# Patient Record
Sex: MALE | Race: WHITE | NOT HISPANIC OR LATINO | Employment: OTHER | URBAN - METROPOLITAN AREA
[De-identification: names, ages, dates, MRNs, and addresses within clinical notes are randomized per-mention and may not be internally consistent; named-entity substitution may affect disease eponyms.]

---

## 2022-04-29 ENCOUNTER — HISTORICAL (OUTPATIENT)
Dept: ADMINISTRATIVE | Facility: HOSPITAL | Age: 87
End: 2022-04-29
Payer: MEDICARE

## 2022-04-29 LAB
ABS NEUT (OLG): 3.56 (ref 2.1–9.2)
ALBUMIN SERPL-MCNC: 3.9 G/DL (ref 3.4–4.8)
ALBUMIN/GLOB SERPL: 1.6 {RATIO} (ref 1.1–2)
ALP SERPL-CCNC: 35 U/L (ref 40–150)
ALT SERPL-CCNC: 20 U/L (ref 0–55)
AST SERPL-CCNC: 26 U/L (ref 5–34)
BASOPHILS # BLD AUTO: 0 10*3/UL (ref 0–0.2)
BASOPHILS NFR BLD AUTO: 0 %
BILIRUB SERPL-MCNC: 0.8 MG/DL
BILIRUBIN DIRECT+TOT PNL SERPL-MCNC: 0.4 (ref 0–0.5)
BILIRUBIN DIRECT+TOT PNL SERPL-MCNC: 0.4 (ref 0–0.8)
BUN SERPL-MCNC: 23.5 MG/DL (ref 8.4–25.7)
CALCIUM SERPL-MCNC: 9.6 MG/DL (ref 8.7–10.5)
CHLORIDE SERPL-SCNC: 107 MMOL/L (ref 98–107)
CO2 SERPL-SCNC: 25 MMOL/L (ref 23–31)
CREAT SERPL-MCNC: 1.41 MG/DL (ref 0.73–1.18)
DEPRECATED CALCIDIOL+CALCIFEROL SERPL-MC: 22.1 NG/ML (ref 30–80)
EOSINOPHIL # BLD AUTO: 0.3 10*3/UL (ref 0–0.9)
EOSINOPHIL NFR BLD AUTO: 4 %
ERYTHROCYTE [DISTWIDTH] IN BLOOD BY AUTOMATED COUNT: 13.2 % (ref 11.5–17)
ERYTHROCYTE [SEDIMENTATION RATE] IN BLOOD: 7 MM/H (ref 0–15)
EST. AVERAGE GLUCOSE BLD GHB EST-MCNC: 102.5 MG/DL
GLOBULIN SER-MCNC: 2.5 G/DL (ref 2.4–3.5)
GLUCOSE SERPL-MCNC: 106 MG/DL (ref 82–115)
HBA1C MFR BLD: 5.2 %
HCT VFR BLD AUTO: 44.9 % (ref 42–52)
HEMOLYSIS INTERF INDEX SERPL-ACNC: 3
HGB BLD-MCNC: 15.4 G/DL (ref 14–18)
ICTERIC INTERF INDEX SERPL-ACNC: 1
LIPEMIC INTERF INDEX SERPL-ACNC: 0
LYMPHOCYTES # BLD AUTO: 2.4 10*3/UL (ref 0.6–4.6)
LYMPHOCYTES NFR BLD AUTO: 34 %
MANUAL DIFF? (OHS): NO
MCH RBC QN AUTO: 32.4 PG (ref 27–31)
MCHC RBC AUTO-ENTMCNC: 34.3 G/DL (ref 33–36)
MCV RBC AUTO: 94.3 FL (ref 80–94)
MONOCYTES # BLD AUTO: 0.8 10*3/UL (ref 0.1–1.3)
MONOCYTES NFR BLD AUTO: 11 %
NEUTROPHILS # BLD AUTO: 3.56 10*3/UL (ref 2.1–9.2)
NEUTROPHILS NFR BLD AUTO: 50 %
PLATELET # BLD AUTO: 158 10*3/UL (ref 130–400)
PMV BLD AUTO: 11.5 FL (ref 9.4–12.4)
POTASSIUM SERPL-SCNC: 3.7 MMOL/L (ref 3.5–5.1)
PROT SERPL-MCNC: 6.4 G/DL (ref 5.8–7.6)
PSA SERPL-MCNC: 7.26 NG/ML
RBC # BLD AUTO: 4.76 10*6/UL (ref 4.7–6.1)
RHEUMATOID FACT SERPL-ACNC: <13 [IU]/ML
SODIUM SERPL-SCNC: 141 MMOL/L (ref 136–145)
T4 FREE SERPL-MCNC: 0.92 NG/DL (ref 0.7–1.48)
TSH SERPL-ACNC: 2.31 M[IU]/L (ref 0.35–4.94)
URATE SERPL-MCNC: 8.1 MG/DL (ref 3.5–7.2)
WBC # SPEC AUTO: 7.1 10*3/UL (ref 4.5–11.5)

## 2022-05-05 ENCOUNTER — LAB VISIT (OUTPATIENT)
Dept: LAB | Facility: HOSPITAL | Age: 87
End: 2022-05-05
Attending: INTERNAL MEDICINE
Payer: MEDICARE

## 2022-05-05 DIAGNOSIS — M81.0 OSTEOPOROSIS, SENILE: ICD-10-CM

## 2022-05-05 DIAGNOSIS — N40.0 BENIGN PROSTATIC HYPERPLASIA, UNSPECIFIED WHETHER LOWER URINARY TRACT SYMPTOMS PRESENT: ICD-10-CM

## 2022-05-05 DIAGNOSIS — E11.9 DIABETES: ICD-10-CM

## 2022-05-05 DIAGNOSIS — Z00.00 WELL ADULT EXAM: ICD-10-CM

## 2022-05-05 DIAGNOSIS — M81.0 OSTEOPOROSIS, SENILE: Primary | ICD-10-CM

## 2022-05-05 DIAGNOSIS — R94.8: ICD-10-CM

## 2022-05-05 DIAGNOSIS — K21.9 GASTROESOPHAGEAL REFLUX DISEASE, UNSPECIFIED WHETHER ESOPHAGITIS PRESENT: ICD-10-CM

## 2022-05-05 DIAGNOSIS — E78.5 HYPERLIPIDEMIA, UNSPECIFIED HYPERLIPIDEMIA TYPE: ICD-10-CM

## 2022-05-05 LAB
CHOLEST SERPL-MCNC: 156 MG/DL
CHOLEST/HDLC SERPL: 3 {RATIO} (ref 0–5)
HDLC SERPL-MCNC: 53 MG/DL (ref 35–60)
LDLC SERPL CALC-MCNC: 85 MG/DL (ref 50–140)
TRIGL SERPL-MCNC: 92 MG/DL (ref 34–140)
VLDLC SERPL CALC-MCNC: 18 MG/DL

## 2022-05-05 PROCEDURE — 36415 COLL VENOUS BLD VENIPUNCTURE: CPT

## 2022-05-05 PROCEDURE — 80061 LIPID PANEL: CPT

## 2024-04-22 ENCOUNTER — HOSPITAL ENCOUNTER (EMERGENCY)
Facility: HOSPITAL | Age: 89
Discharge: HOME | End: 2024-04-22
Attending: EMERGENCY MEDICINE
Payer: MEDICARE

## 2024-04-22 VITALS
RESPIRATION RATE: 20 BRPM | TEMPERATURE: 98 F | HEART RATE: 79 BPM | OXYGEN SATURATION: 97 % | DIASTOLIC BLOOD PRESSURE: 95 MMHG | HEIGHT: 70 IN | WEIGHT: 170 LBS | SYSTOLIC BLOOD PRESSURE: 134 MMHG | BODY MASS INDEX: 24.34 KG/M2

## 2024-04-22 DIAGNOSIS — S00.03XA CONTUSION OF SCALP, INITIAL ENCOUNTER: ICD-10-CM

## 2024-04-22 DIAGNOSIS — S51.811A SKIN TEAR OF RIGHT FOREARM WITHOUT COMPLICATION, INITIAL ENCOUNTER: ICD-10-CM
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
## 2024-04-22 PROCEDURE — 99284 EMERGENCY DEPT VISIT MOD MDM: CPT | Mod: 25

## 2024-04-22 RX ORDER — CLOPIDOGREL BISULFATE 75 MG/1
75 TABLET ORAL EVERY MORNING
COMMUNITY

## 2024-04-22 NOTE — ED PROVIDER NOTES
Encounter Date: 4/22/2024       History     Chief Complaint   Patient presents with    Fall     Pt c/o pain to right hand and head s/p trip and fall. Pt denies loc. + blood thinners.     The history is provided by the patient.   Fall  The accident occurred just prior to arrival. The fall occurred while walking. He fell from a height of 3 to 5 ft. He landed on A hard floor. The point of impact was the head and right wrist. The pain is present in the head, right hand and right wrist. He was Ambulatory at the scene. There was No entrapment after the fall. There was No drug use involved in the accident. There was No alcohol use involved in the accident. Pertinent negatives include no back pain, no fever, no nausea, no vomiting and no loss of consciousness.   States he has chronic gait instability, but he was walking without his rolling walker.  He lost his balance and fell, sustaining abrasions to right wrist/hand area when his hand fell between stair rail spindles.  Struck left forehead on the ground.  Takes blood thinners, he thinks for his heart.    Review of patient's allergies indicates:  No Known Allergies  No past medical history on file. Valvular heart disease  No past surgical history on file. Valve replacement surgery  No family history on file.     Review of Systems   Constitutional:  Negative for fever.   HENT:  Negative for sore throat.    Respiratory:  Negative for shortness of breath.    Cardiovascular:  Negative for chest pain.   Gastrointestinal:  Negative for nausea and vomiting.   Genitourinary:  Negative for dysuria.   Musculoskeletal:  Negative for back pain.   Skin:  Negative for rash.   Neurological:  Negative for loss of consciousness and weakness.   Hematological:  Does not bruise/bleed easily.       Physical Exam     Initial Vitals [04/22/24 1418]   BP Pulse Resp Temp SpO2   (!) 134/95 79 20 98.3 °F (36.8 °C) 97 %      MAP       --         Physical Exam    Nursing note and vitals  reviewed.  Constitutional: He appears well-developed and well-nourished.   HENT:   Head: Normocephalic and atraumatic.       Right Ear: External ear normal.   Left Ear: External ear normal.   Nose: Nose normal.   Eyes: Conjunctivae and EOM are normal. Pupils are equal, round, and reactive to light.   Neck: Neck supple.   Normal range of motion.  Cardiovascular:  Normal rate, regular rhythm and intact distal pulses.           Murmur heard.  Systolic murmur is present with a grade of 2/6.  Winston valve   Pulmonary/Chest: Breath sounds normal.   Abdominal: Abdomen is soft. Bowel sounds are normal.   Musculoskeletal:         General: Normal range of motion.        Hands:       Cervical back: Normal range of motion and neck supple.     Neurological: He is alert and oriented to person, place, and time. He has normal strength. GCS score is 15. GCS eye subscore is 4. GCS verbal subscore is 5. GCS motor subscore is 6.   Skin: Skin is warm and dry. Capillary refill takes less than 2 seconds.   Psychiatric: He has a normal mood and affect. His behavior is normal. Judgment and thought content normal.         ED Course   Procedures  Labs Reviewed - No data to display       Imaging Results              CT Head Without Contrast (Final result)  Result time 04/22/24 15:31:45      Final result by Pari Coronado MD (04/22/24 15:31:45)                   Impression:      No acute intracranial abnormality.    Chronic microvascular ischemic changes.      Electronically signed by: Pari Coronado  Date:    04/22/2024  Time:    15:31               Narrative:    EXAMINATION:  CT HEAD WITHOUT CONTRAST    CLINICAL HISTORY:  Head trauma, minor (Age >= 65y);long term anticoagulant use;    TECHNIQUE:  Axial scans were obtained from skull base to the vertex.    Coronal and sagittal reconstructions obtained from the axial data.    Automatic exposure control was utilized to limit radiation dose.    Contrast: None    Radiation Dose:    Total  DLP: 976 mGy*cm    COMPARISON:  None    FINDINGS:  There is no acute intracranial hemorrhage or edema. The gray-white matter differentiation is preserved.  Scattered hypodensities in the subcortical and periventricular white matter likely represent chronic microvascular ischemic changes.    There is no mass effect or midline shift.  There is diffuse parenchymal volume loss.  The basal cisterns are patent. There is no abnormal extra-axial fluid collection.  Carotid and vertebral artery calcifications are noted.    The calvarium and skull base are intact.  There is mild scattered paranasal sinus mucosal thickening.                                       Medications - No data to display  Medical Decision Making  Amount and/or Complexity of Data Reviewed  Radiology: ordered. Decision-making details documented in ED Course.       Differential includes:  abrasions, contusions, skin tears, ICH.  Given anticoagulant/antiplatelet use and advanced age, will obtain head CT.  No hand or wrist fracture, clinically.  Will dress wounds.                               Clinical Impression:  Final diagnoses:  [W19.XXXA] Fall, initial encounter (Primary)  [S00.03XA] Contusion of scalp, initial encounter  [S51.811A] Skin tear of right forearm without complication, initial encounter          ED Disposition Condition    Discharge Stable          ED Prescriptions    None       Follow-up Information       Follow up With Specialties Details Why Contact Info    Follow up with your primary MD in 3-5 days if not improved.  Return to ED for worsening symptoms.                 Frank Mcqueen MD  04/22/24 [id number]

## 2024-05-20 ENCOUNTER — HOSPITAL ENCOUNTER (INPATIENT)
Facility: HOSPITAL | Age: 89
LOS: 2 days | Discharge: NURSING, REHAB OR LONG-TERM CARE | DRG: 090 | End: 2024-05-23
Attending: EMERGENCY MEDICINE | Admitting: INTERNAL MEDICINE
Payer: MEDICARE

## 2024-05-20 DIAGNOSIS — S01.01XA LACERATION OF SCALP, INITIAL ENCOUNTER: ICD-10-CM

## 2024-05-20 DIAGNOSIS — R55 SYNCOPE: ICD-10-CM

## 2024-05-20 DIAGNOSIS — R55 SYNCOPE, UNSPECIFIED SYNCOPE TYPE: Primary | ICD-10-CM

## 2024-05-20 DIAGNOSIS — R07.9 CHEST PAIN: ICD-10-CM

## 2024-05-20 DIAGNOSIS — Z79.02 ANTIPLATELET OR ANTITHROMBOTIC LONG-TERM USE: ICD-10-CM
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
## 2024-05-20 LAB
ALBUMIN SERPL-MCNC: 4.1 G/DL (ref 3.4–4.8)
ALBUMIN/GLOB SERPL: 1.3 RATIO (ref 1.1–2)
ALP SERPL-CCNC: 63 UNIT/L (ref 40–150)
ALT SERPL-CCNC: 18 UNIT/L (ref 0–55)
ANION GAP SERPL CALC-SCNC: 12 MEQ/L
APTT PPP: 23.7 SECONDS (ref 23.2–33.7)
AST SERPL-CCNC: 34 UNIT/L (ref 5–34)
BASOPHILS # BLD AUTO: 0.07 X10(3)/MCL
BASOPHILS NFR BLD AUTO: 0.6 %
BILIRUB SERPL-MCNC: 0.8 MG/DL
BUN SERPL-MCNC: 11 MG/DL (ref 8.4–25.7)
CALCIUM SERPL-MCNC: 10.1 MG/DL (ref 8.8–10)
CHLORIDE SERPL-SCNC: 107 MMOL/L (ref 98–107)
CO2 SERPL-SCNC: 21 MMOL/L (ref 23–31)
CREAT SERPL-MCNC: 1.25 MG/DL (ref 0.73–1.18)
CREAT/UREA NIT SERPL: 9
EOSINOPHIL # BLD AUTO: 0.3 X10(3)/MCL (ref 0–0.9)
EOSINOPHIL NFR BLD AUTO: 2.8 %
ERYTHROCYTE [DISTWIDTH] IN BLOOD BY AUTOMATED COUNT: 12.8 % (ref 11.5–17)
GFR SERPLBLD CREATININE-BSD FMLA CKD-EPI: 55 ML/MIN/1.73/M2
GLOBULIN SER-MCNC: 3.2 GM/DL (ref 2.4–3.5)
GLUCOSE SERPL-MCNC: 114 MG/DL (ref 82–115)
HCT VFR BLD AUTO: 46 % (ref 42–52)
HGB BLD-MCNC: 16 G/DL (ref 14–18)
IMM GRANULOCYTES # BLD AUTO: 0.08 X10(3)/MCL (ref 0–0.04)
IMM GRANULOCYTES NFR BLD AUTO: 0.7 %
INR PPP: 0.9
LYMPHOCYTES # BLD AUTO: 2.74 X10(3)/MCL (ref 0.6–4.6)
LYMPHOCYTES NFR BLD AUTO: 25.4 %
MCH RBC QN AUTO: 33.8 PG (ref 27–31)
MCHC RBC AUTO-ENTMCNC: 34.8 G/DL (ref 33–36)
MCV RBC AUTO: 97 FL (ref 80–94)
MONOCYTES # BLD AUTO: 0.87 X10(3)/MCL (ref 0.1–1.3)
MONOCYTES NFR BLD AUTO: 8.1 %
NEUTROPHILS # BLD AUTO: 6.72 X10(3)/MCL (ref 2.1–9.2)
NEUTROPHILS NFR BLD AUTO: 62.4 %
NRBC BLD AUTO-RTO: 0 %
PLATELET # BLD AUTO: 136 X10(3)/MCL (ref 130–400)
PMV BLD AUTO: 10.2 FL (ref 7.4–10.4)
POTASSIUM SERPL-SCNC: 3.9 MMOL/L (ref 3.5–5.1)
PROT SERPL-MCNC: 7.3 GM/DL (ref 5.8–7.6)
PROTHROMBIN TIME: 12 SECONDS (ref 12.5–14.5)
RBC # BLD AUTO: 4.74 X10(6)/MCL (ref 4.7–6.1)
SODIUM SERPL-SCNC: 140 MMOL/L (ref 136–145)
TROPONIN I SERPL-MCNC: 0.02 NG/ML (ref 0–0.04)
WBC # SPEC AUTO: 10.78 X10(3)/MCL (ref 4.5–11.5)

## 2024-05-20 PROCEDURE — 96372 THER/PROPH/DIAG INJ SC/IM: CPT | Performed by: NURSE PRACTITIONER

## 2024-05-20 PROCEDURE — 85025 COMPLETE CBC W/AUTO DIFF WBC: CPT | Performed by: EMERGENCY MEDICINE

## 2024-05-20 PROCEDURE — 90471 IMMUNIZATION ADMIN: CPT | Performed by: EMERGENCY MEDICINE

## 2024-05-20 PROCEDURE — 85730 THROMBOPLASTIN TIME PARTIAL: CPT | Performed by: EMERGENCY MEDICINE

## 2024-05-20 PROCEDURE — 25000003 PHARM REV CODE 250: Performed by: INTERNAL MEDICINE

## 2024-05-20 PROCEDURE — 93010 ELECTROCARDIOGRAM REPORT: CPT | Mod: ,,, | Performed by: INTERNAL MEDICINE

## 2024-05-20 PROCEDURE — 63600175 PHARM REV CODE 636 W HCPCS: Performed by: NURSE PRACTITIONER

## 2024-05-20 PROCEDURE — 96375 TX/PRO/DX INJ NEW DRUG ADDON: CPT

## 2024-05-20 PROCEDURE — 85610 PROTHROMBIN TIME: CPT | Performed by: EMERGENCY MEDICINE

## 2024-05-20 PROCEDURE — 90715 TDAP VACCINE 7 YRS/> IM: CPT | Performed by: EMERGENCY MEDICINE

## 2024-05-20 PROCEDURE — 99291 CRITICAL CARE FIRST HOUR: CPT

## 2024-05-20 PROCEDURE — 12002 RPR S/N/AX/GEN/TRNK2.6-7.5CM: CPT

## 2024-05-20 PROCEDURE — 63600175 PHARM REV CODE 636 W HCPCS: Performed by: EMERGENCY MEDICINE

## 2024-05-20 PROCEDURE — 93005 ELECTROCARDIOGRAM TRACING: CPT

## 2024-05-20 PROCEDURE — 80053 COMPREHEN METABOLIC PANEL: CPT | Performed by: EMERGENCY MEDICINE

## 2024-05-20 PROCEDURE — 84484 ASSAY OF TROPONIN QUANT: CPT | Performed by: EMERGENCY MEDICINE

## 2024-05-20 PROCEDURE — 3E0234Z INTRODUCTION OF SERUM, TOXOID AND VACCINE INTO MUSCLE, PERCUTANEOUS APPROACH: ICD-10-PCS | Performed by: EMERGENCY MEDICINE

## 2024-05-20 PROCEDURE — 96365 THER/PROPH/DIAG IV INF INIT: CPT

## 2024-05-20 PROCEDURE — G0378 HOSPITAL OBSERVATION PER HR: HCPCS

## 2024-05-20 PROCEDURE — 25500020 PHARM REV CODE 255: Performed by: NURSE PRACTITIONER

## 2024-05-20 RX ORDER — FINASTERIDE 5 MG/1
5 TABLET, FILM COATED ORAL DAILY
Status: DISCONTINUED | OUTPATIENT
Start: 2024-05-21 | End: 2024-05-23 | Stop reason: HOSPADM

## 2024-05-20 RX ORDER — IBUPROFEN 200 MG
16 TABLET ORAL
Status: DISCONTINUED | OUTPATIENT
Start: 2024-05-20 | End: 2024-05-23 | Stop reason: HOSPADM

## 2024-05-20 RX ORDER — FAMOTIDINE 40 MG/1
40 TABLET, FILM COATED ORAL DAILY
COMMUNITY

## 2024-05-20 RX ORDER — RANOLAZINE 500 MG/1
1000 TABLET, EXTENDED RELEASE ORAL 2 TIMES DAILY
Status: DISCONTINUED | OUTPATIENT
Start: 2024-05-20 | End: 2024-05-23 | Stop reason: HOSPADM

## 2024-05-20 RX ORDER — ASPIRIN 81 MG/1
81 TABLET ORAL DAILY
Status: DISCONTINUED | OUTPATIENT
Start: 2024-05-21 | End: 2024-05-23 | Stop reason: HOSPADM

## 2024-05-20 RX ORDER — FUROSEMIDE 20 MG/1
20 TABLET ORAL DAILY
COMMUNITY

## 2024-05-20 RX ORDER — ALUMINUM HYDROXIDE, MAGNESIUM HYDROXIDE, AND SIMETHICONE 1200; 120; 1200 MG/30ML; MG/30ML; MG/30ML
30 SUSPENSION ORAL 4 TIMES DAILY PRN
Status: DISCONTINUED | OUTPATIENT
Start: 2024-05-20 | End: 2024-05-23 | Stop reason: HOSPADM

## 2024-05-20 RX ORDER — ONDANSETRON HYDROCHLORIDE 2 MG/ML
4 INJECTION, SOLUTION INTRAVENOUS EVERY 4 HOURS PRN
Status: DISCONTINUED | OUTPATIENT
Start: 2024-05-20 | End: 2024-05-23 | Stop reason: HOSPADM

## 2024-05-20 RX ORDER — FUROSEMIDE 20 MG/1
20 TABLET ORAL DAILY
Status: DISCONTINUED | OUTPATIENT
Start: 2024-05-21 | End: 2024-05-23 | Stop reason: HOSPADM

## 2024-05-20 RX ORDER — METOPROLOL TARTRATE 25 MG/1
25 TABLET, FILM COATED ORAL 2 TIMES DAILY
Status: DISCONTINUED | OUTPATIENT
Start: 2024-05-20 | End: 2024-05-23 | Stop reason: HOSPADM

## 2024-05-20 RX ORDER — NALOXONE HCL 0.4 MG/ML
0.02 VIAL (ML) INJECTION
Status: DISCONTINUED | OUTPATIENT
Start: 2024-05-20 | End: 2024-05-23 | Stop reason: HOSPADM

## 2024-05-20 RX ORDER — PROCHLORPERAZINE EDISYLATE 5 MG/ML
5 INJECTION INTRAMUSCULAR; INTRAVENOUS EVERY 6 HOURS PRN
Status: DISCONTINUED | OUTPATIENT
Start: 2024-05-20 | End: 2024-05-23 | Stop reason: HOSPADM

## 2024-05-20 RX ORDER — TALC
6 POWDER (GRAM) TOPICAL NIGHTLY PRN
Status: DISCONTINUED | OUTPATIENT
Start: 2024-05-20 | End: 2024-05-23 | Stop reason: HOSPADM

## 2024-05-20 RX ORDER — ASPIRIN 81 MG/1
81 TABLET ORAL DAILY
COMMUNITY

## 2024-05-20 RX ORDER — ISOSORBIDE MONONITRATE 30 MG/1
30 TABLET, EXTENDED RELEASE ORAL DAILY
COMMUNITY

## 2024-05-20 RX ORDER — METOPROLOL TARTRATE 25 MG/1
25 TABLET, FILM COATED ORAL 2 TIMES DAILY
COMMUNITY

## 2024-05-20 RX ORDER — TAMSULOSIN HYDROCHLORIDE 0.4 MG/1
0.4 CAPSULE ORAL DAILY
Status: DISCONTINUED | OUTPATIENT
Start: 2024-05-21 | End: 2024-05-23 | Stop reason: HOSPADM

## 2024-05-20 RX ORDER — ACETAMINOPHEN 500 MG
1000 TABLET ORAL EVERY 6 HOURS PRN
Status: DISCONTINUED | OUTPATIENT
Start: 2024-05-20 | End: 2024-05-20

## 2024-05-20 RX ORDER — LORAZEPAM 1 MG/1
1 TABLET ORAL EVERY 4 HOURS PRN
Status: DISCONTINUED | OUTPATIENT
Start: 2024-05-20 | End: 2024-05-23 | Stop reason: HOSPADM

## 2024-05-20 RX ORDER — IBUPROFEN 200 MG
24 TABLET ORAL
Status: DISCONTINUED | OUTPATIENT
Start: 2024-05-20 | End: 2024-05-23 | Stop reason: HOSPADM

## 2024-05-20 RX ORDER — GLUCAGON 1 MG
1 KIT INJECTION
Status: DISCONTINUED | OUTPATIENT
Start: 2024-05-20 | End: 2024-05-23 | Stop reason: HOSPADM

## 2024-05-20 RX ORDER — ATORVASTATIN CALCIUM 40 MG/1
40 TABLET, FILM COATED ORAL DAILY
Status: DISCONTINUED | OUTPATIENT
Start: 2024-05-21 | End: 2024-05-23 | Stop reason: HOSPADM

## 2024-05-20 RX ORDER — ATORVASTATIN CALCIUM 40 MG/1
40 TABLET, FILM COATED ORAL DAILY
COMMUNITY

## 2024-05-20 RX ORDER — ACETAMINOPHEN 325 MG/1
650 TABLET ORAL EVERY 4 HOURS PRN
Status: DISCONTINUED | OUTPATIENT
Start: 2024-05-20 | End: 2024-05-20

## 2024-05-20 RX ORDER — AMOXICILLIN 250 MG
1 CAPSULE ORAL 2 TIMES DAILY PRN
Status: DISCONTINUED | OUTPATIENT
Start: 2024-05-20 | End: 2024-05-23 | Stop reason: HOSPADM

## 2024-05-20 RX ORDER — SODIUM CHLORIDE 0.9 % (FLUSH) 0.9 %
10 SYRINGE (ML) INJECTION
Status: DISCONTINUED | OUTPATIENT
Start: 2024-05-20 | End: 2024-05-23 | Stop reason: HOSPADM

## 2024-05-20 RX ORDER — BISACODYL 10 MG/1
10 SUPPOSITORY RECTAL DAILY PRN
Status: DISCONTINUED | OUTPATIENT
Start: 2024-05-20 | End: 2024-05-23 | Stop reason: HOSPADM

## 2024-05-20 RX ORDER — ACETAMINOPHEN 500 MG
500 TABLET ORAL
Status: COMPLETED | OUTPATIENT
Start: 2024-05-20 | End: 2024-05-20

## 2024-05-20 RX ORDER — FINASTERIDE 5 MG/1
5 TABLET, FILM COATED ORAL DAILY
COMMUNITY

## 2024-05-20 RX ORDER — CEFAZOLIN SODIUM 2 G/50ML
2 SOLUTION INTRAVENOUS
Status: COMPLETED | OUTPATIENT
Start: 2024-05-20 | End: 2024-05-20

## 2024-05-20 RX ORDER — ISOSORBIDE MONONITRATE 30 MG/1
30 TABLET, EXTENDED RELEASE ORAL DAILY
Status: DISCONTINUED | OUTPATIENT
Start: 2024-05-21 | End: 2024-05-23 | Stop reason: HOSPADM

## 2024-05-20 RX ORDER — ENOXAPARIN SODIUM 100 MG/ML
40 INJECTION SUBCUTANEOUS EVERY 24 HOURS
Status: DISCONTINUED | OUTPATIENT
Start: 2024-05-20 | End: 2024-05-23 | Stop reason: HOSPADM

## 2024-05-20 RX ORDER — FOLIC ACID 1 MG/1
1 TABLET ORAL DAILY
Status: DISCONTINUED | OUTPATIENT
Start: 2024-05-21 | End: 2024-05-23 | Stop reason: HOSPADM

## 2024-05-20 RX ORDER — TAMSULOSIN HYDROCHLORIDE 0.4 MG/1
0.4 CAPSULE ORAL DAILY
COMMUNITY

## 2024-05-20 RX ORDER — CLOPIDOGREL BISULFATE 75 MG/1
75 TABLET ORAL EVERY MORNING
Status: DISCONTINUED | OUTPATIENT
Start: 2024-05-21 | End: 2024-05-23

## 2024-05-20 RX ORDER — SODIUM CHLORIDE 9 MG/ML
INJECTION, SOLUTION INTRAVENOUS CONTINUOUS
Status: DISCONTINUED | OUTPATIENT
Start: 2024-05-20 | End: 2024-05-22

## 2024-05-20 RX ORDER — THIAMINE HCL 100 MG
100 TABLET ORAL DAILY
Status: DISCONTINUED | OUTPATIENT
Start: 2024-05-21 | End: 2024-05-23 | Stop reason: HOSPADM

## 2024-05-20 RX ORDER — ACETAMINOPHEN 500 MG
1000 TABLET ORAL EVERY 6 HOURS PRN
Status: DISCONTINUED | OUTPATIENT
Start: 2024-05-20 | End: 2024-05-23 | Stop reason: HOSPADM

## 2024-05-20 RX ADMIN — SODIUM CHLORIDE: 9 INJECTION, SOLUTION INTRAVENOUS at 05:05

## 2024-05-20 RX ADMIN — PERFLUTREN 1.3 ML: 6.52 INJECTION, SUSPENSION INTRAVENOUS at 05:05

## 2024-05-20 RX ADMIN — ENOXAPARIN SODIUM 40 MG: 40 INJECTION SUBCUTANEOUS at 05:05

## 2024-05-20 RX ADMIN — CEFAZOLIN SODIUM 2 G: 2 SOLUTION INTRAVENOUS at 04:05

## 2024-05-20 RX ADMIN — TETANUS TOXOID, REDUCED DIPHTHERIA TOXOID AND ACELLULAR PERTUSSIS VACCINE, ADSORBED 0.5 ML: 5; 2.5; 8; 8; 2.5 SUSPENSION INTRAMUSCULAR at 04:05

## 2024-05-20 RX ADMIN — ACETAMINOPHEN 500 MG: 500 TABLET ORAL at 12:05

## 2024-05-20 NOTE — Clinical Note
Diagnosis: Laceration of scalp, initial encounter [217064]   Future Attending Provider: PELON OTOOLE [[id number]]   Admit to which facility:: [facility] [[id number]]

## 2024-05-20 NOTE — H&P
[facility] Hospital Medicine - H&P Note    Patient Name: Wes Stratton  : 1934  MRN: [id number]  PCP: Donta Easley MD  Admitting Physician:  WOJCIECH Ho MD  Admission Class: OP- Observation   Code status: DNR    Allergies   Patient has no known allergies.    Chief Complaint   Possible syncope    History of Present Illness   89 yr old male whose history includes CAD, VHD, HTN and BPH. At the time of my exam patient is AAO x 3. He is hard of hearing but provides a good history. Reports he's been in his usual state of health with no recent fever, vomiting, diarrhea, chest pain or SOB. However, when he woke this morning he had a laceration on his scalp and he was covered in blood. He has no recollection of any falls or injuries occurred during the night. Currently his speech is clear and he has no focal deficits.     VS on arrival: T 98, P 74, R 17, B/P 153/85, Sats 100% on room air. Initial labs unremarkable. CT head negative for acute intracranial findings. CT C-spine negative for acute fractures or dislocations. MRI brain shows moderate chronic microvascular ischemic changes but nothing acute. EKG SR with no concerning ST-T changes. Scalp laceration was sutured in ED.     ROS   Except as documented, all other systems reviewed and negative     Past Medical History   Coronary artery disease  Valvular heart disease - severe AS  Dyslipidemia  BPH  Carotid stenosis - 50-69% right ICA - CTA neck 23  Hard of hearing    Past Surgical History   CABG -   TAVR - 23  [facility] - showed 3 out of 4 occluded bypass grafts - not a candidate for redo CABG    Social History   Denies tobacco use. Drinks two shots of whisky daily and has so for many years. Lives alone and independent of ADLs. Still drives.    Screening for Social Drivers for health:  Patient screened for food insecurity, housing instability, transportation needs, utility difficulties, and interpersonal safety (select all  that apply as identified as concern)  []Housing or Food  []Transportation Needs  []Utility Difficulties  []Interpersonal safety  [x]None      Family History   Reviewed and negative    Home Medications     No current facility-administered medications on file prior to encounter.     Current Outpatient Medications on File Prior to Encounter   Medication Sig Dispense Refill    atorvastatin (LIPITOR) 40 MG tablet Take 40 mg by mouth once daily.      clopidogreL (PLAVIX) 75 mg tablet Take 75 mg by mouth every morning.      famotidine (PEPCID) 40 MG tablet Take 40 mg by mouth once daily.      finasteride (PROSCAR) 5 mg tablet Take 5 mg by mouth once daily.      furosemide (LASIX) 20 MG tablet Take 20 mg by mouth once daily.      metoprolol tartrate (LOPRESSOR) 25 MG tablet Take 25 mg by mouth 2 (two) times daily.      ranolazine 1,000 mg PSRG Take 1,000 mg by mouth 2 (two) times a day.      tamsulosin (FLOMAX) 0.4 mg Cap Take 0.4 mg by mouth once daily.      aspirin (ECOTRIN) 81 MG EC tablet Take 81 mg by mouth once daily.      isosorbide mononitrate (IMDUR) 30 MG 24 hr tablet Take 30 mg by mouth once daily.          Physical Exam   Vital Signs  Temp:  [97.9 °F (36.6 °C)-98.6 °F (37 °C)]   Pulse:  []   Resp:  [16-20]   BP: (138-156)/(85-95)   SpO2:  [95 %-100 %]    General: Appears comfortable  HEENT: NC/AT  Neck:  No JVD  Chest: CTABL  CVS: Regular rhythm. Normal S1/S2. + murmur  Abdomen: nondistended, normoactive BS, soft and non-tender.  MSK: No obvious deformity or joint swelling  Skin: Warm and dry, laceration to occipital skull with sutures intact and no active bleeding  Neuro: AAOx3, no focal neurological deficit  Psych: Cooperative    Labs     Recent Labs     05/20/24  0434   WBC 10.78   RBC 4.74   HGB 16.0   HCT 46.0   MCV 97.0*   MCH 33.8*   MCHC 34.8   RDW 12.8        Recent Labs     05/20/24  0434   PROTIME 12.0*   INR 0.9   PTT 23.7      Recent Labs     05/20/24 0434      K 3.9   CHLORIDE  "107   CO2 21*   BUN 11.0   CREATININE 1.25*   EGFRNORACEVR 55   GLUCOSE 114   CALCIUM 10.1*   ALBUMIN 4.1   GLOBULIN 3.2   ALKPHOS 63   ALT 18   AST 34   BILITOT 0.8     No results for input(s): "LACTIC" in the last 72 hours.  Recent Labs     05/20/24  0434   TROPONINI 0.018        Microbiology Results (last 7 days)       ** No results found for the last 168 hours. **           Imaging   MRI Brain Without Contrast  Narrative: EXAMINATION:  MRI BRAIN WITHOUT CONTRAST    CLINICAL HISTORY:  Stroke, follow up;Transient ischemic attack (TIA);    TECHNIQUE:  Multiplanar, multisequence MR images of the brain were obtained without the administration of intravenous contrast.    COMPARISON:  CT head dated 05/20/2024    FINDINGS:  There is no restricted diffusion, hemorrhage or edema.  Moderate scattered T2/FLAIR hyperintensities in the subcortical and periventricular white matter likely represent chronic microvascular ischemic changes.    There is no mass effect or midline shift.  The basal cisterns are patent.  There is moderate diffuse parenchymal volume loss.  There is no hydrocephalus or abnormal extra-axial fluid collection.  The major intracranial flow voids are patent.  There is mild sphenoid sinus mucosal thickening.  Impression: 1. No acute intracranial abnormality.  2. Moderate chronic microvascular ischemic changes.    Electronically signed by: Pari Coronado  Date:    05/20/2024  Time:    12:28  CT Head Without Contrast  Narrative: Technique: CT of the head was performed without intravenous contrast with axial as well as coronal and sagittal images.    Comparison: April 22, 2024    Dosage Information: Automated exposure control was utilized.  DLP 1240    Clinical history: Fall (Arrives [facility] tripped fell hit head on corner or endtable by bed - positive loc, denies thinners, head lac bleeding controlled w/ kerlex dressing at this time, gcs 15).    Findings:    Hemorrhage: No acute intracranial hemorrhage is " seen.    CSF spaces: The ventricles, sulci and basal cisterns all appear significantly prominent suggesting an element of global cerebral atrophy.    Brain parenchyma: No acute large vessel territory infarct is identified. Moderate scattered microvascular change is seen in portions of the periventricular and deep white matter tracts.    Cerebellum: Unremarkable.    Vascular: Moderate atheromatous calcification of the intracranial arteries is seen.    Sella and skull base: The sella appears to be within normal limits for age.    Cerebellopontine angles: Within normal limits.    Herniation: None.    Calvarium: No acute linear or depressed skull fracture is seen.    Scalp: Minimal scalp soft tissue swelling is seen along the midline high parietal region. This consistent with a scalp contusion. No underlying bone injury is identified.    Maxillofacial Structures:    Paranasal sinuses: There is mucoperiosteal thickening of the sphenoid sinus.  Otherwise, the visualized paranasal sinuses appear clear with no significant mucoperiosteal thickening or air fluid levels identified.    Orbits: The bilateral native orbital lenses are absent. The orbits are otherwise unremarkable.  Impression: Impression:    1. Minimal scalp soft tissue swelling is seen along the midline high parietal region. This consistent with a scalp contusion. No underlying bone injury is identified.    2. No acute intracranial traumatic injury identified. Details and other findings as noted above.    No significant discrepancy with overnight report.    Electronically signed by: Rajat Reyes  Date:    05/20/2024  Time:    07:52  CT Cervical Spine Without Contrast  Narrative: Technique: CT of the cervical spine was performed without intravenous contrast with axial as well as sagittal and coronal images.    Comparison: None.    Dosage Information: Automated exposure control was utilized.  DLP 1240    Clinical history: Fall (Arrives [facility] unit [facility] tripped fell hit  head on corner or endtable by bed - positive loc, denies thinners, head lac bleeding controlled w/ kerlex dressing at this time, gcs 15).    Findings:    Lung apices: The visualized lung apices appear unremarkable.    Spine:    Mineralization: Within normal limits.    Rotation: No significant rotation is seen.    Scoliosis: No significant scoliosis is seen.    Vertebral Fusion: No vertebral fusion is identified.    Listhesis: No significant listhesis is identified.    Lordosis: The cervical lordosis is maintained.    Intervertebral disc spaces: Severely decreased disc height is seen at C4-C5.    Endplate Sclerosis: Endplate sclerosis is seen off the opposing endplates at C4-C5.    Facet degenerative changes: Mild multilevel facet degenerative changes are seen.    Fractures: No acute cervical spine fracture dislocation or subluxation is seen.    This exam does not exclude the possibility of intrathecal soft tissue, ligamentous or vascular injury.  Impression: Impression:    1. No acute cervical spine fracture dislocation or subluxation is seen.    2. Degenerative changes and other details as above.    No significant discrepancy with overnight report    Electronically signed by: Rajat Reyes  Date:    05/20/2024  Time:    07:49    Assessment & Plan     Possible syncope vs fall with concussion   - CT head and MRI brain both negative for acute findings  - ECHO and carotid US pending  - Fall precautions    Scalp laceration   - sutures can be removed by PCP in 7-10 days    Hx CAD - S/P CABG 1985  - resume, ASA, plavix and statin    Hx severe AS - S/P TAVR June 2024    Hypertension - stab;e  - home meds resumed    VTE Prophylaxis: Lovenox  CODE STATUS: Detailed discussion with patient and he is clear that in the event of cardiac or respiratory arrest he does not want CPR or intubation.      IMarilyn, CARL-BC have discussed this patients case with Dr. Ho who agrees with the diagnosis and treatment plan.    I  Anjelica rosales MD agree with above  89-year-old male with past medical history of coronary artery disease, valvular heart disease, hypertension, BPH woke up this morning and found himself covered in the blood he has no re-collection of any falls or injuries during the night patient denies any history of seizures.  CT of the head was negative CT of the C-spine was negative for any acute fractures.  MRI of the brain was done and that showed chronic microvascular ischemic changes but nothing acute.  Patient had scalp laceration that was sutured patient has been admitted to hospitalist service for further management and care   General alert awake oriented but very hard of hearing  Chest clear to auscultate   Abdomen soft nontender    Will get syncope workup that includes echo, carotid ultrasound get orthostatics put the patient on telemetry monitoring   Will consult PT and OT  Ativan 1 p.o. q.6 p.r.n. for any kind of withdrawal symptoms [facility] protocol  Will also start on thiamine and folate    Prophylaxis: Lovenox

## 2024-05-20 NOTE — ED PROVIDER NOTES
Encounter Date: 5/20/2024       History     Chief Complaint   Patient presents with    Fall     Arrives [facility] tripped fell hit head on corner or endtable by bed - positive loc, denies thinners, head lac bleeding controlled w/ kerlex dressing at this time, gcs 15     Patient is an 89-year-old male who was transferred to the ER by ambulance secondary to apparent fall at home.  Patient states he does not remember the fall.  Patient states he woke up in his bed with blood on the sheets and all over him.  Patient states he thinks he may have hit the headboard when he was getting back in bed from using the bathroom but he does not know for sure.  Patient is on Plavix.  It was reported prior to arrival that patient was not on blood thinners.  Patient denies headache or neck pain.  He denies any chest pain or shortness of breath.  Patient lives alone.  Patient has a history of hypertension, aortic stenosis, coronary artery disease and remote CABG and is on Plavix      Review of patient's allergies indicates:  No Known Allergies  No past medical history on file.  No past surgical history on file.  No family history on file.     Review of Systems   HENT: Negative.     Eyes:  Negative for pain.   Respiratory: Negative.     Cardiovascular: Negative.    Gastrointestinal: Negative.    Musculoskeletal: Negative.    Skin:  Positive for wound.   Neurological: Negative.    Psychiatric/Behavioral: Negative.         Physical Exam     Initial Vitals [05/20/24 0326]   BP Pulse Resp Temp SpO2   (!) 153/85 74 17 98 °F (36.7 °C) 100 %      MAP       --         Physical Exam    Nursing note and vitals reviewed.  Constitutional: He appears well-developed and well-nourished.   Patient is well-appearing, GCS is 15.  Patient is very hard of hearing.   HENT:   Patient has a 4 cm laceration to the scalp with moderate bleeding   Eyes: Pupils are equal, round, and reactive to light.   Neck: Neck supple.   There was no C-spine tenderness to  palpation   Normal range of motion.  Cardiovascular:  Normal rate, regular rhythm and normal heart sounds.           Pulmonary/Chest: Breath sounds normal. No respiratory distress. He has no wheezes. He has no rhonchi.   Abdominal: Abdomen is soft. There is no abdominal tenderness.   Musculoskeletal:         General: Normal range of motion.      Cervical back: Normal range of motion and neck supple.     Neurological: He is alert and oriented to person, place, and time. He has normal strength.   Skin:   Laceration to the scalp as above, patient also has a minor skin tear to the left forearm.   Psychiatric: He has a normal mood and affect. Thought content normal.         ED Course   Lac Repair    Date/Time: 5/20/2024 4:14 AM    Performed by: Samy Caldera MD  Authorized by: Samy Caldera MD    Consent:     Consent obtained:  Verbal and emergent situation  Universal protocol:     Patient identity confirmed:  Verbally with patient  Anesthesia:     Anesthesia method:  Local infiltration    Local anesthetic:  Lidocaine 1% w/o epi  Laceration details:     Location:  Scalp    Length (cm):  4  Pre-procedure details:     Preparation:  Patient was prepped and draped in usual sterile fashion  Treatment:     Area cleansed with:  Povidone-iodine    Amount of cleaning:  Standard    Irrigation solution:  Sterile saline    Irrigation volume:  250    Irrigation method:  Pressure wash    Visualized foreign bodies/material removed: no      Debridement:  Minimal  Skin repair:     Repair method:  Sutures    Suture size:  3-0    Suture material:  Chromic gut    Suture technique:  Simple interrupted    Number of sutures:  14  Approximation:     Approximation:  Close  Repair type:     Repair type:  Intermediate  Post-procedure details:     Dressing:  Bulky dressing    Procedure completion:  Tolerated  Critical Care    Date/Time: 5/20/2024 4:45 AM    Performed by: Samy Caldera MD  Authorized by: Samy Caldera MD   Direct patient critical care time: 20 minutes  Additional history critical care time: 5 minutes  Ordering / reviewing critical care time: 5 minutes  Documentation critical care time: 10 minutes  Consulting other physicians critical care time: 5 minutes  Total critical care time (exclusive of procedural time) : 45 minutes  Critical care time was exclusive of separately billable procedures and treating other patients and teaching time.  Critical care was necessary to treat or prevent imminent or life-threatening deterioration of the following conditions: trauma and CNS failure or compromise.  Critical care was time spent personally by me on the following activities: development of treatment plan with patient or surrogate, discussions with consultants, interpretation of cardiac output measurements, evaluation of patient's response to treatment, examination of patient, obtaining history from patient or surrogate, ordering and performing treatments and interventions, ordering and review of laboratory studies, ordering and review of radiographic studies, pulse oximetry, re-evaluation of patient's condition and review of old charts.        Labs Reviewed   COMPREHENSIVE METABOLIC PANEL - Abnormal; Notable for the following components:       Result Value    CO2 21 (*)     Creatinine 1.25 (*)     Calcium 10.1 (*)     All other components within normal limits   PROTIME-INR - Abnormal; Notable for the following components:    PT 12.0 (*)     All other components within normal limits   CBC WITH DIFFERENTIAL - Abnormal; Notable for the following components:    MCV 97.0 (*)     MCH 33.8 (*)     IG# 0.08 (*)     All other components within normal limits   APTT - Normal   TROPONIN I - Normal   CBC W/ AUTO DIFFERENTIAL    Narrative:     The following orders were created for panel order CBC auto differential.  Procedure                               Abnormality         Status                     ---------                                -----------         ------                     CBC with Differential[[id number]]       Abnormal            Final result                 Please view results for these tests on the individual orders.     EKG Readings: (Independently Interpreted)   Initial Reading: No STEMI. Rhythm: Normal Sinus Rhythm. Heart Rate: 77. Ectopy: No Ectopy. Conduction: RBBB. ST Segments: Normal ST Segments. T Waves: Normal. Axis: Normal.     ECG Results              EKG 12-lead (Final result)        Collection Time Result Time QRS Duration OHS QTC Calculation    05/20/24 04:19:17 05/23/24 13:24:58 136 463                     Final result by Interface, Lab [facility] (05/23/24 13:25:04)                   Narrative:    Test Reason : W19.XXXA,    Vent. Rate : 077 BPM     Atrial Rate : 077 BPM     P-R Int : 174 ms          QRS Dur : 136 ms      QT Int : 410 ms       P-R-T Axes : 068 113 062 degrees     QTc Int : 463 ms    Normal sinus rhythm  Right bundle branch block  Abnormal ECG  No previous ECGs available  Confirmed by Hunter Tillman MD ([id number]) on 5/23/2024 1:24:53 PM    Referred By: [email or web address]   SELF           Confirmed By:Hunter Tillman MD                                  Imaging Results              MRI Brain Without Contrast (Final result)  Result time 05/20/24 12:28:58      Final result by Pari Coronado MD (05/20/24 12:28:58)                   Impression:      1. No acute intracranial abnormality.  2. Moderate chronic microvascular ischemic changes.      Electronically signed by: Pari Coronado  Date:    05/20/2024  Time:    12:28               Narrative:    EXAMINATION:  MRI BRAIN WITHOUT CONTRAST    CLINICAL HISTORY:  Stroke, follow up;Transient ischemic attack (TIA);    TECHNIQUE:  Multiplanar, multisequence MR images of the brain were obtained without the administration of intravenous contrast.    COMPARISON:  CT head dated 05/20/2024    FINDINGS:  There is no restricted diffusion, hemorrhage or edema.  Moderate scattered  T2/FLAIR hyperintensities in the subcortical and periventricular white matter likely represent chronic microvascular ischemic changes.    There is no mass effect or midline shift.  The basal cisterns are patent.  There is moderate diffuse parenchymal volume loss.  There is no hydrocephalus or abnormal extra-axial fluid collection.  The major intracranial flow voids are patent.  There is mild sphenoid sinus mucosal thickening.                                       X-Ray Chest 1 View (Final result)  Result time 05/20/24 13:56:29      Final result by Pari Coronado MD (05/20/24 13:56:29)                   Impression:      No acute abnormality of the chest.      Electronically signed by: Pari Coronado  Date:    05/20/2024  Time:    13:56               Narrative:    EXAMINATION:  XR CHEST 1 VIEW    CLINICAL HISTORY:  MRI request for any implant verification. p;    TECHNIQUE:  AP chest    COMPARISON:  None.    FINDINGS:  The heart is normal in size.  There are changes of prior median sternotomy with prosthetic heart valve in place.  There is bibasilar subsegmental atelectasis.  There is no pleural effusion or visible pneumothorax.                                       CT Cervical Spine Without Contrast (Final result)  Result time 05/20/24 07:49:33      Final result by Rajat Reyes MD (05/20/24 07:49:33)                   Impression:    Impression:    1. No acute cervical spine fracture dislocation or subluxation is seen.    2. Degenerative changes and other details as above.    No significant discrepancy with overnight report      Electronically signed by: Rajat Reyes  Date:    05/20/2024  Time:    07:49               Narrative:      Technique: CT of the cervical spine was performed without intravenous contrast with axial as well as sagittal and coronal images.    Comparison: None.    Dosage Information: Automated exposure control was utilized.  DLP 1240    Clinical history: Fall (Arrives [facility] tripped  fell hit head on corner or endtable by bed - positive loc, denies thinners, head lac bleeding controlled w/ kerlex dressing at this time, gcs 15).    Findings:    Lung apices: The visualized lung apices appear unremarkable.    Spine:    Mineralization: Within normal limits.    Rotation: No significant rotation is seen.    Scoliosis: No significant scoliosis is seen.    Vertebral Fusion: No vertebral fusion is identified.    Listhesis: No significant listhesis is identified.    Lordosis: The cervical lordosis is maintained.    Intervertebral disc spaces: Severely decreased disc height is seen at C4-C5.    Endplate Sclerosis: Endplate sclerosis is seen off the opposing endplates at C4-C5.    Facet degenerative changes: Mild multilevel facet degenerative changes are seen.    Fractures: No acute cervical spine fracture dislocation or subluxation is seen.    This exam does not exclude the possibility of intrathecal soft tissue, ligamentous or vascular injury.                        Preliminary result by Homar Fontana MD (05/20/24 04:35:18)                   Impression:    1. No acute cervical spine fracture dislocation or subluxation is seen.  2. Degenerative changes and other details as above.               Narrative:    START OF REPORT:  Technique: CT of the cervical spine was performed without intravenous contrast with axial as well as sagittal and coronal images.    Comparison: None.    Dosage Information: Automated exposure control was utilized.    Clinical history: Fall (Arrives [facility] tripped fell hit head on corner or endtable by bed - positive loc, denies thinners, head lac bleeding controlled w/ kerlex dressing at this time, gcs 15).    Findings:  Lung apices: The visualized lung apices appear unremarkable.  Spine:  Spinal canal: The spinal canal appears unremarkable.  Spinal cord: The spinal cord appears unremarkable.  Mineralization: Within normal limits.  Rotation: No significant rotation is  seen.  Scoliosis: No significant scoliosis is seen.  Vertebral Fusion: No vertebral fusion is identified.  Listhesis: No significant listhesis is identified.  Lordosis: The cervical lordosis is maintained.  Intervertebral disc spaces: Severely decreased disc height is seen at C4-C5.  Endplate Sclerosis: Endplate sclerosis is seen off the opposing endplates at C4-C5.  Facet degenerative changes: Mild multilevel facet degenerative changes are seen.  Fractures: No acute cervical spine fracture dislocation or subluxation is seen.    Miscellaneous:  Mastoid air cells: The visualized mastoid air cells appear clear.  Soft Tissues: Unremarkable.                                         CT Head Without Contrast (Final result)  Result time 05/20/24 07:52:30      Final result by Rajat Reyes MD (05/20/24 07:52:30)                   Impression:    Impression:    1. Minimal scalp soft tissue swelling is seen along the midline high parietal region. This consistent with a scalp contusion. No underlying bone injury is identified.    2. No acute intracranial traumatic injury identified. Details and other findings as noted above.    No significant discrepancy with overnight report.      Electronically signed by: Rajat Reyes  Date:    05/20/2024  Time:    07:52               Narrative:      Technique: CT of the head was performed without intravenous contrast with axial as well as coronal and sagittal images.    Comparison: April 22, 2024    Dosage Information: Automated exposure control was utilized.  DLP 1240    Clinical history: Fall (Arrives [facility] tripped fell hit head on corner or endtable by bed - positive loc, denies thinners, head lac bleeding controlled w/ kerlex dressing at this time, gcs 15).    Findings:    Hemorrhage: No acute intracranial hemorrhage is seen.    CSF spaces: The ventricles, sulci and basal cisterns all appear significantly prominent suggesting an element of global cerebral atrophy.    Brain  parenchyma: No acute large vessel territory infarct is identified. Moderate scattered microvascular change is seen in portions of the periventricular and deep white matter tracts.    Cerebellum: Unremarkable.    Vascular: Moderate atheromatous calcification of the intracranial arteries is seen.    Sella and skull base: The sella appears to be within normal limits for age.    Cerebellopontine angles: Within normal limits.    Herniation: None.    Calvarium: No acute linear or depressed skull fracture is seen.    Scalp: Minimal scalp soft tissue swelling is seen along the midline high parietal region. This consistent with a scalp contusion. No underlying bone injury is identified.    Maxillofacial Structures:    Paranasal sinuses: There is mucoperiosteal thickening of the sphenoid sinus.  Otherwise, the visualized paranasal sinuses appear clear with no significant mucoperiosteal thickening or air fluid levels identified.    Orbits: The bilateral native orbital lenses are absent. The orbits are otherwise unremarkable.                        Preliminary result by Homar Fontana MD (05/20/24 04:34:26)                   Impression:    1. Minimal scalp soft tissue swelling is seen along the midline high parietal region. This consistent with a scalp contusion. No underlying bone injury is identified.  2. No acute intracranial traumatic injury identified. Details and other findings as noted above.               Narrative:    START OF REPORT:  Technique: CT of the head was performed without intravenous contrast with axial as well as coronal and sagittal images.    Comparison: None.    Dosage Information: Automated exposure control was utilized.    Clinical history: Fall (Arrives [facility] tripped fell hit head on corner or endtable by bed - positive loc, denies thinners, head lac bleeding controlled w/ kerlex dressing at this time, gcs 15).    Findings:  Hemorrhage: No acute intracranial hemorrhage is seen.  CSF spaces: The  ventricles, sulci and basal cisterns all appear significantly prominent suggesting an element of global cerebral atrophy.  Brain parenchyma: No acute infarct is identified. Moderate scattered microvascular change is seen in portions of the periventricular and deep white matter tracts.  Cerebellum: Unremarkable.  Vascular: Moderate atheromatous calcification of the intracranial arteries is seen.  Sella and skull base: The sella appears to be within normal limits for age.  Cerebellopontine angles: Within normal limits.  Herniation: None.  Intracranial calcifications: Incidental note is made of bilateral choroid plexus calcification. Incidental note is made of some pineal region calcification. Incidental note is made of some calcification of the falx.  Calvarium: No acute linear or depressed skull fracture is seen.  Scalp: Minimal scalp soft tissue swelling is seen along the midline high parietal region. This consistent with a scalp contusion. No underlying bone injury is identified.    Maxillofacial Structures:  Paranasal sinuses: The visualized paranasal sinuses appear clear with no significant mucoperiosteal thickening or air fluid levels identified.  Orbits: The bilateral native orbital lenses are absent. The orbits are otherwise unremarkable.  Zygomatic arches: The zygomatic arches are intact and unremarkable.  Temporal bones and mastoids: The temporal bones and mastoids appear unremarkable.  TMJ: The mandibular condyles appear normally placed with respect to the mandibular fossa.                                         Medications   cefazolin (ANCEF) 2 gram in dextrose 5% 50 mL IVPB (premix) (0 g Intravenous Stopped 5/20/24 8013)   Tdap (BOOSTRIX) vaccine injection 0.5 mL (0.5 mLs Intramuscular Given 5/20/24 6634)   acetaminophen tablet 500 mg (500 mg Oral Given 5/20/24 1245)   perflutren lipid microspheres injection 1.3 mL (1.3 mLs Intravenous Given 5/20/24 8245)     Medical Decision Making  Differential  "diagnosis: Accidental fall, syncope, near syncope, laceration to the scalp, intracranial injury, C-spine injury, antiplatelet use    Amount and/or Complexity of Data Reviewed  Labs: ordered.     Details: Labs are stable, calcium is elevated at 10.1  Radiology: ordered and independent interpretation performed.  ECG/medicine tests: ordered. Decision-making details documented in ED Course.  Discussion of management or test interpretation with external provider(s): CT of the head and C-spine show no acute changes other than some soft tissue swelling of the scalp.  Patient remains alert and oriented.  Sutures were placed to scalp laceration.  Patient remains alert and oriented.  Patient does not remember events that caused the scalp laceration.  Patient lives alone.  He is on Plavix for history of coronary artery disease.  Will consult Trauma for admission.    Risk  Prescription drug management.               ED Course as of 05/24/24 2231   Mon May 20, 2024   0440 Trauma surgery was consulted, they will evaluate patient [KG]   0815 I have seen evaluated the patient.  Complains of "I feel like I got hit by a truck" but otherwise awake alert and in good spirits.  Bandage head.  Laceration to back of head.  No focal neuro deficits.  Does not remember events.  He believes he might have passed out and/or tripped and hit his head but he was unsure.  However given his advanced age, the fact he lives alone, possible syncope we will seek admission.  He was comfortable with this plan.  Care to be transferred. [MM-2]   0815 [facility] medicine [MM]   0838 Spoke to Angelita, on-call for hospitalist.  Will admit. [MM-2]      ED Course User Index  [KG] Samy Caldera MD  [MM] Nae Caldwell  [MM-2] Randal Gonsales MD                           Clinical Impression:  Final diagnoses:  [W19.XXXA] Fall  [S01.01XA] Laceration of scalp, initial encounter  [Z79.02] Antiplatelet or antithrombotic long-term use  [R55] Syncope, " unspecified syncope type (Primary)          ED Disposition Condition    Observation Stable                Samy Caldera MD  05/20/24 3232       Samy Caldera MD  05/20/24 5326       Samy Caldera MD  05/24/24 5667

## 2024-05-20 NOTE — CONSULTS
Trauma Surgery  Consult Note    Patient Name: Wes Stratton  YOB: 1934  Date: 05/20/2024 6:27 PM  Date of Admission: 5/20/2024  HD#0  POD#* No surgery found *    PRESENTING HISTORY   Chief Complaint/Reason for Admission: Ground level fall    History of Present Illness:  88 y/o M with a Hx of CAD, VHD, HTN and BPH who presented to [facility] ED after a ground level fall while on Plavix. Pt reports he tripped on his feet and fell on top of his endtable in his bedroom. He had + LOC he attributes to hitting his head, but denies having a syncopal event prior to falling. Since arrival, his neurologic status has been stable and CT head /spine were negative for any acute traumatic injuries. He endorses a prior Hx of vertigo. Denies lightheadedness, dizziness at this time. Head laceration bleeding was controlled with a gauze dressing.    Review of Systems:  12 point ROS negative except as stated in HPI    PAST HISTORY:   Past medical history:  No past medical history on file.    Past surgical history:  No past surgical history on file.    Family history:  No family history on file.    Social history:  Social History     Socioeconomic History    Marital status:      Social History     Tobacco Use   Smoking Status Not on file   Smokeless Tobacco Not on file      Social History     Substance and Sexual Activity   Alcohol Use Not on file        MEDICATIONS & ALLERGIES:     No current facility-administered medications on file prior to encounter.     Current Outpatient Medications on File Prior to Encounter   Medication Sig    atorvastatin (LIPITOR) 40 MG tablet Take 40 mg by mouth once daily.    clopidogreL (PLAVIX) 75 mg tablet Take 75 mg by mouth every morning.    famotidine (PEPCID) 40 MG tablet Take 40 mg by mouth once daily.    finasteride (PROSCAR) 5 mg tablet Take 5 mg by mouth once daily.    furosemide (LASIX) 20 MG tablet Take 20 mg by mouth once daily.    metoprolol tartrate (LOPRESSOR) 25 MG tablet  Take 25 mg by mouth 2 (two) times daily.    ranolazine 1,000 mg PSRG Take 1,000 mg by mouth 2 (two) times a day.    tamsulosin (FLOMAX) 0.4 mg Cap Take 0.4 mg by mouth once daily.    aspirin (ECOTRIN) 81 MG EC tablet Take 81 mg by mouth once daily.    isosorbide mononitrate (IMDUR) 30 MG 24 hr tablet Take 30 mg by mouth once daily.       Allergies: Review of patient's allergies indicates:  No Known Allergies    Scheduled Meds:   [START ON 5/21/2024] aspirin  81 mg Oral Daily    [START ON 5/21/2024] atorvastatin  40 mg Oral Daily    [START ON 5/21/2024] clopidogreL  75 mg Oral QAM    enoxparin  40 mg Subcutaneous Q24H (prophylaxis, 1700)    [START ON 5/21/2024] finasteride  5 mg Oral Daily    [START ON 5/21/2024] folic acid  1 mg Oral Daily    [START ON 5/21/2024] furosemide  20 mg Oral Daily    [START ON 5/21/2024] isosorbide mononitrate  30 mg Oral Daily    metoprolol tartrate  25 mg Oral BID    ranolazine  1,000 mg Oral BID    [START ON 5/21/2024] tamsulosin  0.4 mg Oral Daily    [START ON 5/21/2024] thiamine  100 mg Oral Daily       Continuous Infusions:   sodium chloride 0.9%   Intravenous Continuous 50 mL/hr at 05/20/24 1747 New Bag at 05/20/24 1747       PRN Meds:  Current Facility-Administered Medications:     acetaminophen, 1,000 mg, Oral, Q6H PRN    aluminum-magnesium hydroxide-simethicone, 30 mL, Oral, QID PRN    bisacodyL, 10 mg, Rectal, Daily PRN    dextrose 10%, 12.5 g, Intravenous, PRN    dextrose 10%, 25 g, Intravenous, PRN    glucagon (human recombinant), 1 mg, Intramuscular, PRN    glucose, 16 g, Oral, PRN    glucose, 24 g, Oral, PRN    LORazepam, 1 mg, Oral, Q4H PRN    melatonin, 6 mg, Oral, Nightly PRN    naloxone, 0.02 mg, Intravenous, PRN    ondansetron, 4 mg, Intravenous, Q4H PRN    prochlorperazine, 5 mg, Intravenous, Q6H PRN    senna-docusate 8.6-50 mg, 1 tablet, Oral, BID PRN    sodium chloride 0.9%, 10 mL, Intravenous, PRN    OBJECTIVE:   Vital Signs:  VITAL SIGNS: 24 HR MIN & MAX LAST  "  Temp  Min: 97.9 °F (36.6 °C)  Max: 98.6 °F (37 °C)  98.2 °F (36.8 °C)   BP  Min: 134/80  Max: 156/86  (!) 140/62    Pulse  Min: 74  Max: 107  79    Resp  Min: 16  Max: 20  16    SpO2  Min: 95 %  Max: 100 %  96 %      HT: 5' 10" (177.8 cm)  WT: 77.1 kg (169 lb 15.6 oz)  BMI: 24.4     Intake/output:  Intake/Output - Last 3 Shifts       None          No intake or output data in the 24 hours ending 05/20/24 1827      Physical Exam:  General: Well developed, well nourished, no acute distress  HEENT: Normocephalic, dried blood around forehead laceration   CV: RR  Resp: NWOB  GI:  Abdomen soft, non-tender, non-distended, no guarding, no rebound, normoactive bowel sounds, no masses  :  Deferred  MSK: No muscle atrophy, cyanosis, peripheral edema, moving all extremities spontaneously  Skin/wounds:  No rashes, ulcers, erythema  Neuro:  CNII-XII grossly intact, alert and oriented to person, place, and time    Labs:  Troponin:  Recent Labs     05/20/24 0434   TROPONINI 0.018     CBC:  Recent Labs     05/20/24 0434   WBC 10.78   RBC 4.74   HGB 16.0   HCT 46.0      MCV 97.0*   MCH 33.8*   MCHC 34.8     CMP:  Recent Labs     05/20/24 0434   CALCIUM 10.1*   ALBUMIN 4.1      K 3.9   CO2 21*   BUN 11.0   CREATININE 1.25*   ALKPHOS 63   ALT 18   AST 34   BILITOT 0.8     Lactic Acid:  No results for input(s): "LACTATE" in the last 72 hours.  ETOH:  No results for input(s): "ETHANOL" in the last 72 hours.   Urine Drug Screen:  No results for input(s): "COCAINE", "OPIATE", "BARBITURATE", "AMPHETAMINE", "FENTANYL", "CANNABINOIDS", "MDMA" in the last 72 hours.    Invalid input(s): "BENZODIAZEPINE", "PHENCYCLIDINE"   ABG:  No results for input(s): "PH", "PCO2", "PO2", "HCO3", "BE", "POCSATURATED" in the last 72 hours.    Diagnostic Results:  MRI Brain Without Contrast   Final Result      1. No acute intracranial abnormality.   2. Moderate chronic microvascular ischemic changes.         Electronically signed by: Pari " Cliff   Date:    05/20/2024   Time:    12:28      X-Ray Chest 1 View   Final Result      No acute abnormality of the chest.         Electronically signed by: Pari Coronado   Date:    05/20/2024   Time:    13:56      CT Cervical Spine Without Contrast   Final Result   Impression:      1. No acute cervical spine fracture dislocation or subluxation is seen.      2. Degenerative changes and other details as above.      No significant discrepancy with overnight report         Electronically signed by: Rajat Reyes   Date:    05/20/2024   Time:    07:49      CT Head Without Contrast   Final Result   Impression:      1. Minimal scalp soft tissue swelling is seen along the midline high parietal region. This consistent with a scalp contusion. No underlying bone injury is identified.      2. No acute intracranial traumatic injury identified. Details and other findings as noted above.      No significant discrepancy with overnight report.         Electronically signed by: Rajat Reyes   Date:    05/20/2024   Time:    07:52          ASSESSMENT & PLAN:    89 y/o M with a Hx of CAD, VHD, HTN and BPH presenting to the ED after sustaining a ground level fall while on blood thinners. + LOC from head trauma. Radiologic workup negative for traumatic injury.    - No indications for admission from a trauma surgery standpoint  - Recommend further syncope workup. Pt denies a syncopal event but has had prior falls in the past  - PT/OT  - Dispo per ED     Amrik Palomino MD  [facility], PGY-2

## 2024-05-21 PROBLEM — R55 SYNCOPE: Status: ACTIVE | Noted: 2024-05-21

## 2024-05-21 LAB
ANION GAP SERPL CALC-SCNC: 9 MEQ/L
AV INDEX (PROSTH): 0.4
AV MEAN GRADIENT: 16 MMHG
AV PEAK GRADIENT: 25 MMHG
AV VALVE AREA BY VELOCITY RATIO: 1.45 CM²
AV VALVE AREA: 1.25 CM²
AV VELOCITY RATIO: 0.46
BSA FOR ECHO PROCEDURE: 1.95 M2
BUN SERPL-MCNC: 11 MG/DL (ref 8.4–25.7)
CALCIUM SERPL-MCNC: 8.8 MG/DL (ref 8.8–10)
CHLORIDE SERPL-SCNC: 109 MMOL/L (ref 98–107)
CO2 SERPL-SCNC: 22 MMOL/L (ref 23–31)
CREAT SERPL-MCNC: 1.07 MG/DL (ref 0.73–1.18)
CREAT/UREA NIT SERPL: 10
DOP CALC AO PEAK VEL: 2.51 M/S
DOP CALC AO VTI: 46.4 CM
DOP CALC LVOT AREA: 3.1 CM2
DOP CALC LVOT DIAMETER: 2 CM
DOP CALC LVOT PEAK VEL: 1.16 M/S
DOP CALC LVOT STROKE VOLUME: 57.78 CM3
DOP CALC MV VTI: 26.1 CM
DOP CALCLVOT PEAK VEL VTI: 18.4 CM
E WAVE DECELERATION TIME: 261 MSEC
E/A RATIO: 0.55
E/E' RATIO: 11.27 M/S
GFR SERPLBLD CREATININE-BSD FMLA CKD-EPI: >60 ML/MIN/1.73/M2
GLUCOSE SERPL-MCNC: 102 MG/DL (ref 82–115)
LEFT ATRIUM SIZE: 3.3 CM
LEFT VENTRICLE DIASTOLIC VOLUME INDEX: 56.92 ML/M2
LEFT VENTRICLE DIASTOLIC VOLUME: 111 ML
LEFT VENTRICLE SYSTOLIC VOLUME INDEX: 29.4 ML/M2
LEFT VENTRICLE SYSTOLIC VOLUME: 57.4 ML
LV LATERAL E/E' RATIO: 10.33 M/S
LV SEPTAL E/E' RATIO: 12.4 M/S
LVOT MG: 3 MMHG
LVOT MV: 0.72 CM/S
MV MEAN GRADIENT: 2 MMHG
MV PEAK A VEL: 1.13 M/S
MV PEAK E VEL: 0.62 M/S
MV PEAK GRADIENT: 5 MMHG
MV STENOSIS PRESSURE HALF TIME: 43 MS
MV VALVE AREA BY CONTINUITY EQUATION: 2.21 CM2
MV VALVE AREA P 1/2 METHOD: 5.12 CM2
OHS LV EJECTION FRACTION SIMPSONS BIPLANE MOD: 48 %
PISA TR MAX VEL: 2.4 M/S
POTASSIUM SERPL-SCNC: 4.8 MMOL/L (ref 3.5–5.1)
PV PEAK GRADIENT: 5 MMHG
PV PEAK VELOCITY: 1.15 M/S
RA PRESSURE ESTIMATED: 3 MMHG
RV TB RVSP: 5 MMHG
SODIUM SERPL-SCNC: 140 MMOL/L (ref 136–145)
TDI LATERAL: 0.06 M/S
TDI SEPTAL: 0.05 M/S
TDI: 0.06 M/S
TR MAX PG: 23 MMHG
TRICUSPID ANNULAR PLANE SYSTOLIC EXCURSION: 1.59 CM
TV REST PULMONARY ARTERY PRESSURE: 26 MMHG

## 2024-05-21 PROCEDURE — 80048 BASIC METABOLIC PNL TOTAL CA: CPT | Performed by: INTERNAL MEDICINE

## 2024-05-21 PROCEDURE — 97166 OT EVAL MOD COMPLEX 45 MIN: CPT

## 2024-05-21 PROCEDURE — 21400001 HC TELEMETRY ROOM

## 2024-05-21 PROCEDURE — 25000003 PHARM REV CODE 250: Performed by: NURSE PRACTITIONER

## 2024-05-21 PROCEDURE — 36415 COLL VENOUS BLD VENIPUNCTURE: CPT | Performed by: INTERNAL MEDICINE

## 2024-05-21 PROCEDURE — 0HQ0XZZ REPAIR SCALP SKIN, EXTERNAL APPROACH: ICD-10-PCS | Performed by: EMERGENCY MEDICINE

## 2024-05-21 PROCEDURE — 97162 PT EVAL MOD COMPLEX 30 MIN: CPT

## 2024-05-21 PROCEDURE — 63600175 PHARM REV CODE 636 W HCPCS: Performed by: NURSE PRACTITIONER

## 2024-05-21 PROCEDURE — 25000003 PHARM REV CODE 250: Performed by: INTERNAL MEDICINE

## 2024-05-21 RX ORDER — FOLIC ACID 1 MG/1
1 TABLET ORAL DAILY
Qty: 30 TABLET | Refills: 0 | Status: SHIPPED | OUTPATIENT
Start: 2024-05-22 | End: 2024-05-23

## 2024-05-21 RX ORDER — LANOLIN ALCOHOL/MO/W.PET/CERES
100 CREAM (GRAM) TOPICAL DAILY
Qty: 30 TABLET | Refills: 0 | Status: SHIPPED | OUTPATIENT
Start: 2024-05-22 | End: 2024-05-23

## 2024-05-21 RX ADMIN — RANOLAZINE 1000 MG: 500 TABLET, EXTENDED RELEASE ORAL at 09:05

## 2024-05-21 RX ADMIN — FUROSEMIDE 20 MG: 20 TABLET ORAL at 08:05

## 2024-05-21 RX ADMIN — FOLIC ACID 1 MG: 1 TABLET ORAL at 08:05

## 2024-05-21 RX ADMIN — TAMSULOSIN HYDROCHLORIDE 0.4 MG: 0.4 CAPSULE ORAL at 08:05

## 2024-05-21 RX ADMIN — THIAMINE HCL TAB 100 MG 100 MG: 100 TAB at 08:05

## 2024-05-21 RX ADMIN — METOPROLOL TARTRATE 25 MG: 25 TABLET, FILM COATED ORAL at 08:05

## 2024-05-21 RX ADMIN — ASPIRIN 81 MG: 81 TABLET, COATED ORAL at 08:05

## 2024-05-21 RX ADMIN — FINASTERIDE 5 MG: 5 TABLET, FILM COATED ORAL at 08:05

## 2024-05-21 RX ADMIN — ENOXAPARIN SODIUM 40 MG: 40 INJECTION SUBCUTANEOUS at 06:05

## 2024-05-21 RX ADMIN — ISOSORBIDE MONONITRATE 30 MG: 30 TABLET, EXTENDED RELEASE ORAL at 08:05

## 2024-05-21 RX ADMIN — CLOPIDOGREL BISULFATE 75 MG: 75 TABLET ORAL at 08:05

## 2024-05-21 RX ADMIN — RANOLAZINE 1000 MG: 500 TABLET, EXTENDED RELEASE ORAL at 08:05

## 2024-05-21 RX ADMIN — METOPROLOL TARTRATE 25 MG: 25 TABLET, FILM COATED ORAL at 09:05

## 2024-05-21 RX ADMIN — ATORVASTATIN CALCIUM 40 MG: 40 TABLET, FILM COATED ORAL at 08:05

## 2024-05-21 NOTE — NURSING
Nurses Note -- 4 Eyes      5/21/2024   2:19 AM      Skin assessed during: Admit      [x] No Altered Skin Integrity Present    [x]Prevention Measures Documented      [] Yes- Altered Skin Integrity Present or Discovered   [] LDA Added if Not in Epic (Describe Wound)   [] New Altered Skin Integrity was Present on Admit and Documented in LDA   [] Wound Image Taken    Wound Care Consulted? No    Attending Nurse:  Aviva Wooten RN/Staff Member:   Lisandra RAMAN

## 2024-05-21 NOTE — PT/OT/SLP EVAL
"Physical Therapy Evaluation    Patient Name:  Wes Stratton   MRN:  [id number]    Recommendations:     Discharge therapy intensity: High Intensity Therapy   Discharge Equipment Recommendations: shower chair   Barriers to discharge: Decreased caregiver support    Assessment:     Wes Stratton is a 89 y.o. male admitted with a medical diagnosis of ground level fall c LOC and scalp laceration s/p suturing .  He presents with the following impairments/functional limitations: impaired balance, decreased safety awareness.Pt tolerated session well. PT self reports multiple falls in the last 3 months and is a high risk for falls "due to vertigo." Pt lives alone in ground level apartment with flat entrance. Pt ambulated 80' c rolling walker and CGA.     Rehab Prognosis: Good; patient would benefit from acute skilled PT services to address these deficits and reach maximum level of function.    Recent Surgery: * No surgery found *      Plan:     During this hospitalization, patient would benefit from acute PT services 5 x/week to address the identified rehab impairments via gait training, therapeutic activities, therapeutic exercises and progress toward the following goals:    Plan of Care Expires:       Subjective     Chief Complaint: balance   Patient/Family Comments/goals: to improve balance to reduce number of falls.   Pain/Comfort:       Patients cultural, spiritual, Islam conflicts given the current situation:      Living Environment:  Lives alone in ground level apartment with flat entrance    Prior to admission, patients level of function was independent at home, community ambulation with rollator, and could drive himself.  Equipment used at home: rollator.  DME owned (not currently used): none.  Upon discharge, patient will not have assistance 24/7.    Objective:     Patient found HOB elevated with peripheral IV, PureWick  upon PT entry to room.    General Precautions: Standard,    Orthopedic Precautions: " n/a  Braces:  n/a  Respiratory Status: Room air        Exams:  Cognitive Exam:  Patient is oriented to Person, Place, Time, and Situation  Fine Motor Coordination:    -       Intact  Sensation:    -       Intact  RLE Strength: WNL  LLE Strength: WNL  Skin integrity: Visible skin intact      Functional Mobility:  Bed Mobility:     Supine to Sit: independence  Transfers:     Sit to Stand:  contact guard assistance with rolling walker  Toilet Transfer: contact guard assistance with  rolling walker  using  Stand Pivot  Gait: 80' ambulation c rolling walker and CGA      AM-PAC 6 CLICK MOBILITY  Total Score:21       Treatment & Education:      Patient provided with verbal education education regarding PT role/goals/POC and safety awareness.  Understanding was verbalized.     Patient left up in chair with all lines intact and call button in reach.    GOALS:   Multidisciplinary Problems       Physical Therapy Goals          Problem: Physical Therapy    Goal Priority Disciplines Outcome Goal Variances Interventions   Physical Therapy Goal     PT, PT/OT Progressing     Description: Goals to be met by: d/c    Patient will increase functional independence with mobility by performin. Sit to stand transfer with [place]  2. Gait  x 150 feet with [place] using Rolling Walker.   3. Improve dynamic standing balance to decrease fall risk.                          History:     No past medical history on file.    No past surgical history on file.    Time Tracking:     PT Received On: 24  PT Start Time: 1348     PT Stop Time: 1419  PT Total Time (min): 31 min     Billable Minutes: Evaluation 2024

## 2024-05-21 NOTE — PROGRESS NOTES
Inpatient Nutrition Evaluation    Admit Date: 5/20/2024   Total duration of encounter: 1 day    Nutrition Recommendation/Prescription     Diet Heart Healthy ordered  Add Boost Original (provides 250 kcal and 10 g protein per container)  Encouraged adequate PO intake  Monitor appetite/PO intake, weight, and labs    Nutrition Assessment     Chart Review    Reason Seen: malnutrition screening tool (MST)    Malnutrition Screening Tool Results   Have you recently lost weight without trying?: Unsure  Have you been eating poorly because of a decreased appetite?: No   MST Score: 2     Diagnosis:  Possible syncope vs fall with concussion      Scalp laceration      Hx CAD - S/P CABG 1985     Hx severe AS - S/P TAVR June 2024     Hypertension - stable    Relevant Medical History:   Coronary artery disease  Valvular heart disease - severe AS  Dyslipidemia  BPH  Carotid stenosis - 50-69% right ICA - CTA neck 5/2/23  Hard of hearing    Nutrition-Related Medications:   Scheduled Meds:   aspirin  81 mg Oral Daily    atorvastatin  40 mg Oral Daily    clopidogreL  75 mg Oral QAM    enoxparin  40 mg Subcutaneous Q24H (prophylaxis, 1700)    finasteride  5 mg Oral Daily    folic acid  1 mg Oral Daily    furosemide  20 mg Oral Daily    isosorbide mononitrate  30 mg Oral Daily    metoprolol tartrate  25 mg Oral BID    ranolazine  1,000 mg Oral BID    tamsulosin  0.4 mg Oral Daily    thiamine  100 mg Oral Daily     Continuous Infusions:   sodium chloride 0.9%   Intravenous Continuous 50 mL/hr at 05/20/24 1747 New Bag at 05/20/24 1747     PRN Meds:.  Current Facility-Administered Medications:     acetaminophen, 1,000 mg, Oral, Q6H PRN    aluminum-magnesium hydroxide-simethicone, 30 mL, Oral, QID PRN    bisacodyL, 10 mg, Rectal, Daily PRN    dextrose 10%, 12.5 g, Intravenous, PRN    dextrose 10%, 25 g, Intravenous, PRN    glucagon (human recombinant), 1 mg, Intramuscular, PRN    glucose, 16 g, Oral, PRN    glucose, 24 g, Oral, PRN     "LORazepam, 1 mg, Oral, Q4H PRN    melatonin, 6 mg, Oral, Nightly PRN    naloxone, 0.02 mg, Intravenous, PRN    ondansetron, 4 mg, Intravenous, Q4H PRN    prochlorperazine, 5 mg, Intravenous, Q6H PRN    senna-docusate 8.6-50 mg, 1 tablet, Oral, BID PRN    sodium chloride 0.9%, 10 mL, Intravenous, PRN      Nutrition-Related Labs:  Recent Labs   Lab 24  0434 24  0423    140   K 3.9 4.8   CALCIUM 10.1* 8.8   CHLORIDE 107 109*   CO2 21* 22*   BUN 11.0 11.0   CREATININE 1.25* 1.07   EGFRNORACEVR 55 >60   GLUCOSE 114 102   BILITOT 0.8  --    ALKPHOS 63  --    ALT 18  --    AST 34  --    ALBUMIN 4.1  --    WBC 10.78  --    HGB 16.0  --    HCT 46.0  --          Diet Order: Diet Heart Healthy  Oral Supplement Order: none  Appetite/Oral Intake: fair/50-75% of meals  Factors Affecting Nutritional Intake: decreased appetite  Food/Latter day/Cultural Preferences: none reported  Food Allergies: none reported    Skin Integrity: wound (head laceration, L hand, L arm)  Wound(s):   none noted    Comments    2024: Pt reports a decreased appetite/PO intake >1 year. Pt reports a fair appetite/PO intake. Will add ONS as preventative MNT. Pt denies N/V and chewing/swallowing difficulties. Last BM documented as 2024. Per EMR, pt weighed 88.5 kg on 2023 (13% wt loss in 1 year, significant). Encouraged adequate PO intake. Will monitor.    Anthropometrics    Height: 5' 10" (177.8 cm) Height Method: Stated  Last Weight: 77.1 kg (169 lb 15.6 oz) (24 0000) Weight Method: Bed Scale  BMI (Calculated): 24.4  BMI Classification: normal (BMI 18.5-24.9)        Ideal Body Weight (IBW), Male: 166 lb     % Ideal Body Weight, Male (lb): 102.4 %                 Usual Body Weight (UBW), k.5 kg  % Usual Body Weight: 87.3     Usual Weight Provided By: EMR weight history    Wt Readings from Last 5 Encounters:   24 77.1 kg (169 lb 15.6 oz)   24 77.1 kg (170 lb)     Weight Change(s) Since Admission:  Admit " Weight: 77.1 kg (170 lb) (05/20/24 0326)  5/21/2024: 77.1 kg    Patient Education    Not applicable.    Monitoring & Evaluation     Dietitian will monitor food and beverage intake, weight, electrolyte/renal panel, glucose/endocrine profile, and gastrointestinal profile.  Nutrition Risk/Follow-Up: low (follow-up in 5-7 days)  Patients assigned 'low nutrition risk' status do not qualify for a full nutritional assessment but will be monitored and re-evaluated in a 5-7 day time period. Please consult if re-evaluation needed sooner.

## 2024-05-21 NOTE — ED NOTES
SBAR given to LAMINE Chicas on 6T, pt updated, transport notified- pt stable,  vitals stable, pt belongings placed in bag and pt ready for transport

## 2024-05-21 NOTE — PT/OT/SLP EVAL
Occupational Therapy  Evaluation    Name: Wes Stratton  MRN: [id number]  Admitting Diagnosis: fall  Recent Surgery: * No surgery found *      Recommendations:     Discharge therapy intensity: High Intensity Therapy   Discharge Equipment Recommendations:  shower chair, bedside commode  Barriers to discharge:  Decreased caregiver support, Other (Comment) (impaired safety and mobility; impaired ability to complete ADLs compared to baseline)    Assessment:     Wes Stratton is a 89 y.o. male with a medical diagnosis of ground level fall c LOC and scalp laceration s/p suturing.  He presents with the following performance deficits affecting function: weakness, decreased safety awareness, impaired balance, impaired self care skills, impaired functional mobility, gait instability.     Pt CGA for bed mobility and transfer to toilet today, however, pt remains a high fall risk 2/2 impaired safety awareness and impaired insight into deficits. Pt would benefit from shower chair upon dc home to improve independence and safety with bathing. Currently recommending high-intensity therapy upon dc to improve safety and independence with ADLs prior to returning home as patient lives alone without consistent assistance.     Rehab Prognosis: Good; patient would benefit from acute skilled OT services to address these deficits and reach maximum level of function.       Plan:     Patient to be seen 5 x/week to address the above listed problems via self-care/home management, therapeutic activities, therapeutic exercises  Plan of Care Expires: 06/18/24  Plan of Care Reviewed with: patient    Subjective     Chief Complaint: wanting to get up out of bed  Patient/Family Comments/goals: return home    Occupational Profile:  Living Environment: Pt lives alone in 1st floor apt.   Previous level of function: Pt reports independence with functional mobility and ADLs. Pt uses folding chair in bathtub   Roles and Routines: father, friend  Equipment Used  at Home: walker, rolling  Assistance upon Discharge: Pt will not have 24/7 assistance upon dc.     Pain/Comfort:  Pain Rating 1: 0/10    Patients cultural, spiritual, Spiritism conflicts given the current situation: no    Objective:     OT communicated with RN prior to session.      Patient was found supine with bed alarm, peripheral IV upon OT entry to room.    General Precautions: Standard, fall  Orthopedic Precautions: N/A  Braces: N/A    Vital Signs: Respiratory Status: on room air    Bed Mobility:    Patient completed Supine to Sit with stand by assistance    Functional Mobility/Transfers:  Patient completed Sit <> Stand Transfer with contact guard assistance  with  rolling walker   Patient completed Bed <> Chair Transfer using Step Transfer technique with contact guard assistance with rolling walker  Patient completed Toilet Transfer Step Transfer technique with contact guard assistance with  rolling walker  Functional Mobility: Pt ambulated ~80ft in hallway with CGA and RW.     Activities of Daily Living:  Toileting: total assistance male external catheter in place    AMPAC 6 Click ADL:  AMPAC Total Score:      Functional Cognition:  Pt oriented x 4.  Poor safety awareness and insight into deficits/fall risks.    Visual Perceptual Skills:  Intact  Finger to nose and finger opposition intact    Upper Extremity Function:  Right Upper Extremity:   WFL    Left Upper Extremity:  WFL    Balance:   CGA dynamic standing. Pt reports vertigo.    Therapeutic Positioning  Risk for acquired pressure injuries is increased due to relative decrease in mobility d/t hospitalization  and altered skin already present.    OT interventions performed during the course of today's session:   Education was provided on benefits of and recommendations for therapeutic positioning    Skin assessment: all bony prominences were assessed    Findings: known area of altered skin integrity at scalp    OT recommendations for therapeutic  positioning throughout hospitalization:   Follow [facility] Pressure Injury Prevention Protocol    Patient Education:  Patient provided with verbal education education regarding OT role/goals/POC, fall prevention, safety awareness, and Discharge/DME recommendations.  Understanding was verbalized, however additional teaching warranted.     Patient left up in chair with all lines intact and call button in reach.    GOALS:   Multidisciplinary Problems       Occupational Therapy Goals          Problem: Occupational Therapy    Goal Priority Disciplines Outcome Interventions   Occupational Therapy Goal     OT, PT/OT Progressing    Description: Goals to be met by 6/21/2024    Pt will complete grooming standing at sink with LRAD with modified independence.  Pt will complete UB dressing with modified independence.  Pt will complete LB dressing with modified independence using LRAD.  Pt will complete toileting with modified independence using LRAD.  Pt will complete functional mobility to/from toilet and toilet transfer with modified independence using LRAD.                         History:     No past medical history on file.    No past surgical history on file.    Time Tracking:     OT Date of Treatment:    OT Start Time: 1350  OT Stop Time: 1417  OT Total Time (min): 27 min    Billable Minutes:Evaluation moderate complexity    5/21/2024

## 2024-05-21 NOTE — PLAN OF CARE
05/21/24 0934   Discharge Assessment   Assessment Type Discharge Planning Assessment   Confirmed/corrected address, phone number and insurance Yes   Source of Information patient   When was your last doctors appointment?   (PCP is Dr. Donta Easley. Patient reports last PCP appointment was 6 months ago.)   Reason For Admission Possible Syncope   People in Home alone   Do you expect to return to your current living situation? Yes   Do you have help at home or someone to help you manage your care at home? Yes   Who are your caregiver(s) and their phone number(s)? Zay/Arya Son In Law/[phone]   Current cognitive status: Alert/Oriented   Walking or Climbing Stairs Difficulty yes   Walking or Climbing Stairs ambulation difficulty, requires equipment   Mobility Management Ambulate with a Rollator   Dressing/Bathing Difficulty no   Home Accessibility stairs to enter home   Number of Stairs, Main Entrance one   Home Layout Able to live on 1st floor   Equipment Currently Used at Home rollator   Readmission within 30 days? No   Do you currently have service(s) that help you manage your care at home? No   Do you take prescription medications? Yes   Do you have prescription coverage? Yes   Do you have any problems affording any of your prescribed medications? No   Who is going to help you get home at discharge? Step son and daughter in law   How do you get to doctors appointments? car, drives self   Are you on dialysis? No   Do you take coumadin? No   Discharge Plan A Home   Discharge Plan B Home Health   Discharge Plan discussed with: Patient

## 2024-05-21 NOTE — PROGRESS NOTES
[facility] Hospital Medicine Progress Note        Chief Complaint: Inpatient Follow-up for     HPI: 89 yr old male whose history includes CAD, VHD, HTN and BPH. At the time of my exam patient is AAO x 3. He is hard of hearing but provides a good history. Reports he's been in his usual state of health with no recent fever, vomiting, diarrhea, chest pain or SOB. However, when he woke this morning he had a laceration on his scalp and he was covered in blood. He has no recollection of any falls or injuries occurred during the night. Currently his speech is clear and he has no focal deficits.      VS on arrival: T 98, P 74, R 17, B/P 153/85, Sats 100% on room air. Initial labs unremarkable. CT head negative for acute intracranial findings. CT C-spine negative for acute fractures or dislocations. MRI brain shows moderate chronic microvascular ischemic changes but nothing acute. EKG SR with no concerning ST-T changes. Scalp laceration was sutured in ED.     Interval Hx:   Patient seen and examined this morning no complaints patient worked with physical therapy and they are recommending high-intensity therapy  Case was discussed with patient's nurse and  on the floor.    Objective/physical exam:  General: In no acute distress, afebrile  Chest: Clear to auscultation bilaterally  Heart: RRR, +S1, S2, no appreciable murmur  Abdomen: Soft, nontender, BS +  MSK: Warm, no lower extremity edema, no clubbing or cyanosis  Neurologic: Alert and awake    VITAL SIGNS: 24 HRS MIN & MAX LAST   Temp  Min: 97.3 °F (36.3 °C)  Max: 98.3 °F (36.8 °C) 97.5 °F (36.4 °C)   BP  Min: 103/64  Max: 157/89 103/64   Pulse  Min: 67  Max: 85  67   Resp  Min: 12  Max: 24 18   SpO2  Min: 94 %  Max: 99 % 96 %     I have reviewed the following labs:  Recent Labs   Lab 05/20/24  0434   WBC 10.78   RBC 4.74   HGB 16.0   HCT 46.0   MCV 97.0*   MCH 33.8*   MCHC 34.8   RDW 12.8      MPV 10.2     Recent Labs   Lab  05/20/24  0434 05/21/24  0423    140   K 3.9 4.8   CO2 21* 22*   BUN 11.0 11.0   CREATININE 1.25* 1.07   CALCIUM 10.1* 8.8   ALBUMIN 4.1  --    ALKPHOS 63  --    ALT 18  --    AST 34  --    BILITOT 0.8  --      Microbiology Results (last 7 days)       ** No results found for the last 168 hours. **             See below for Radiology    Scheduled Med:   aspirin  81 mg Oral Daily    atorvastatin  40 mg Oral Daily    clopidogreL  75 mg Oral QAM    enoxparin  40 mg Subcutaneous Q24H (prophylaxis, 1700)    finasteride  5 mg Oral Daily    folic acid  1 mg Oral Daily    furosemide  20 mg Oral Daily    isosorbide mononitrate  30 mg Oral Daily    metoprolol tartrate  25 mg Oral BID    ranolazine  1,000 mg Oral BID    tamsulosin  0.4 mg Oral Daily    thiamine  100 mg Oral Daily      Continuous Infusions:   sodium chloride 0.9%   Intravenous Continuous 50 mL/hr at 05/20/24 1747 New Bag at 05/20/24 1747      PRN Meds:    Current Facility-Administered Medications:     acetaminophen, 1,000 mg, Oral, Q6H PRN    aluminum-magnesium hydroxide-simethicone, 30 mL, Oral, QID PRN    bisacodyL, 10 mg, Rectal, Daily PRN    dextrose 10%, 12.5 g, Intravenous, PRN    dextrose 10%, 25 g, Intravenous, PRN    glucagon (human recombinant), 1 mg, Intramuscular, PRN    glucose, 16 g, Oral, PRN    glucose, 24 g, Oral, PRN    LORazepam, 1 mg, Oral, Q4H PRN    melatonin, 6 mg, Oral, Nightly PRN    naloxone, 0.02 mg, Intravenous, PRN    ondansetron, 4 mg, Intravenous, Q4H PRN    prochlorperazine, 5 mg, Intravenous, Q6H PRN    senna-docusate 8.6-50 mg, 1 tablet, Oral, BID PRN    sodium chloride 0.9%, 10 mL, Intravenous, PRN     Assessment/Plan:  Possible syncope versus fall with concussion   Scalp laceration  History of coronary artery disease status post CABG  History of severe AS status post TAVR   Essential hypertension    MRI of the brain negative CT of the head negative  PT was consulted they are recommending high-intensity therapy but  apparently patient refused   Will talk to family in detail tomorrow and see what the home situation is if they can stay with him 247 because patient already had a big laceration this time and he is at fall risk  Other home medications have been resumed   2D echo shows EF of 50-55% and transcatheter valve replacement in the aortic position  Carotid ultrasound no significant blockage  Prophylaxis:  Lovenox    VTE prophylaxis:     Patient condition:  Stable/Fair/Guarded/ Serious/ Critical    Anticipated discharge and Disposition:         All diagnosis and differential diagnosis have been reviewed; assessment and plan has been documented; I have personally reviewed the labs and test results that are presently available; I have reviewed the patients medication list; I have reviewed the consulting providers response and recommendations. I have reviewed or attempted to review medical records based upon their availability    All of the patient's questions have been  addressed and answered. Patient's is agreeable to the above stated plan. I will continue to monitor closely and make adjustments to medical management as needed.  _____________________________________________________________________    Nutrition Status:    Radiology:  I have personally reviewed the following imaging and agree with the radiologist.     Echo    Left Ventricle: The left ventricle is normal in size. Normal wall   thickness. There is low normal systolic function with a visually estimated   ejection fraction of 50 - 55%. Grade I diastolic dysfunction.    Right Ventricle: Normal right ventricular cavity size. Systolic   function is borderline low.    Aortic Valve: There is a transcatheter valve replacement in the aortic   position. There is aortic valve sclerosis. There is annular calcification   present. Aortic valve area by VTI is 1.25 cm². Aortic valve peak velocity   is 2.51 m/s. Mean gradient is 16 mmHg. The dimensionless index is 0.40.    Mitral  Valve: There is no stenosis. The mean pressure gradient across   the mitral valve is 2 mmHg at a heart rate of  bpm. There is mild   regurgitation.    Tricuspid Valve: There is trace regurgitation.    IVC/SVC: Normal venous pressure at 3 mmHg.      Anjelica Ho MD  Department of Hospital Medicine   [facility]   05/21/2024                 Family

## 2024-05-21 NOTE — PLAN OF CARE
Problem: Physical Therapy  Goal: Physical Therapy Goal  Description: Goals to be met by: d/c    Patient will increase functional independence with mobility by performin. Sit to stand transfer with [place]  2. Gait  x 150 feet with [place] using Rolling Walker.   3. Improve dynamic standing balance to decrease fall risk.     Outcome: Progressing

## 2024-05-21 NOTE — PLAN OF CARE
Problem: Occupational Therapy  Goal: Occupational Therapy Goal  Description: Goals to be met by 6/21/2024    Pt will complete grooming standing at sink with LRAD with modified independence.  Pt will complete UB dressing with modified independence.  Pt will complete LB dressing with modified independence using LRAD.  Pt will complete toileting with modified independence using LRAD.  Pt will complete functional mobility to/from toilet and toilet transfer with modified independence using LRAD.    Outcome: Progressing

## 2024-05-22 PROCEDURE — 21400001 HC TELEMETRY ROOM

## 2024-05-22 PROCEDURE — 97116 GAIT TRAINING THERAPY: CPT | Mod: CQ

## 2024-05-22 PROCEDURE — 63600175 PHARM REV CODE 636 W HCPCS: Performed by: NURSE PRACTITIONER

## 2024-05-22 PROCEDURE — 25000003 PHARM REV CODE 250: Performed by: NURSE PRACTITIONER

## 2024-05-22 PROCEDURE — 97535 SELF CARE MNGMENT TRAINING: CPT

## 2024-05-22 PROCEDURE — 97530 THERAPEUTIC ACTIVITIES: CPT | Mod: CQ

## 2024-05-22 PROCEDURE — 25000003 PHARM REV CODE 250: Performed by: INTERNAL MEDICINE

## 2024-05-22 RX ADMIN — RANOLAZINE 1000 MG: 500 TABLET, EXTENDED RELEASE ORAL at 09:05

## 2024-05-22 RX ADMIN — METOPROLOL TARTRATE 25 MG: 25 TABLET, FILM COATED ORAL at 09:05

## 2024-05-22 RX ADMIN — FUROSEMIDE 20 MG: 20 TABLET ORAL at 09:05

## 2024-05-22 RX ADMIN — ISOSORBIDE MONONITRATE 30 MG: 30 TABLET, EXTENDED RELEASE ORAL at 09:05

## 2024-05-22 RX ADMIN — CLOPIDOGREL BISULFATE 75 MG: 75 TABLET ORAL at 09:05

## 2024-05-22 RX ADMIN — TAMSULOSIN HYDROCHLORIDE 0.4 MG: 0.4 CAPSULE ORAL at 09:05

## 2024-05-22 RX ADMIN — THIAMINE HCL TAB 100 MG 100 MG: 100 TAB at 09:05

## 2024-05-22 RX ADMIN — METOPROLOL TARTRATE 25 MG: 25 TABLET, FILM COATED ORAL at 07:05

## 2024-05-22 RX ADMIN — ENOXAPARIN SODIUM 40 MG: 40 INJECTION SUBCUTANEOUS at 05:05

## 2024-05-22 RX ADMIN — FINASTERIDE 5 MG: 5 TABLET, FILM COATED ORAL at 09:05

## 2024-05-22 RX ADMIN — ASPIRIN 81 MG: 81 TABLET, COATED ORAL at 09:05

## 2024-05-22 RX ADMIN — RANOLAZINE 1000 MG: 500 TABLET, EXTENDED RELEASE ORAL at 07:05

## 2024-05-22 RX ADMIN — ATORVASTATIN CALCIUM 40 MG: 40 TABLET, FILM COATED ORAL at 09:05

## 2024-05-22 RX ADMIN — FOLIC ACID 1 MG: 1 TABLET ORAL at 09:05

## 2024-05-22 NOTE — PROGRESS NOTES
[facility] Hospital Medicine Progress Note        Chief Complaint: Inpatient Follow-up for     HPI: 89 yr old male whose history includes CAD, VHD, HTN and BPH. At the time of my exam patient is AAO x 3. He is hard of hearing but provides a good history. Reports he's been in his usual state of health with no recent fever, vomiting, diarrhea, chest pain or SOB. However, when he woke this morning he had a laceration on his scalp and he was covered in blood. He has no recollection of any falls or injuries occurred during the night. Currently his speech is clear and he has no focal deficits.      VS on arrival: T 98, P 74, R 17, B/P 153/85, Sats 100% on room air. Initial labs unremarkable. CT head negative for acute intracranial findings. CT C-spine negative for acute fractures or dislocations. MRI brain shows moderate chronic microvascular ischemic changes but nothing acute. EKG SR with no concerning ST-T changes. Scalp laceration was sutured in ED.     Interval Hx:   Patient seen and examined this morning no complaints family bedside patient is now agreeable to skilled nursing facility so we have consulted case management    Objective/physical exam:  General: In no acute distress, afebrile  Chest: Clear to auscultation bilaterally  Heart: RRR, +S1, S2, no appreciable murmur  Abdomen: Soft, nontender, BS +  MSK: Warm, no lower extremity edema, no clubbing or cyanosis  Neurologic: Alert and awake    VITAL SIGNS: 24 HRS MIN & MAX LAST   Temp  Min: 97.5 °F (36.4 °C)  Max: 98.8 °F (37.1 °C) 97.6 °F (36.4 °C)   BP  Min: 92/52  Max: 112/69 112/69   Pulse  Min: 65  Max: 82  65   Resp  Min: 18  Max: 18 18   SpO2  Min: 93 %  Max: 96 % 96 %     I have reviewed the following labs:  Recent Labs   Lab 05/20/24 0434   WBC 10.78   RBC 4.74   HGB 16.0   HCT 46.0   MCV 97.0*   MCH 33.8*   MCHC 34.8   RDW 12.8      MPV 10.2     Recent Labs   Lab 05/20/24 0434 05/21/24  0423    140   K 3.9  4.8   CO2 21* 22*   BUN 11.0 11.0   CREATININE 1.25* 1.07   CALCIUM 10.1* 8.8   ALBUMIN 4.1  --    ALKPHOS 63  --    ALT 18  --    AST 34  --    BILITOT 0.8  --      Microbiology Results (last 7 days)       ** No results found for the last 168 hours. **             See below for Radiology    Scheduled Med:   aspirin  81 mg Oral Daily    atorvastatin  40 mg Oral Daily    clopidogreL  75 mg Oral QAM    enoxparin  40 mg Subcutaneous Q24H (prophylaxis, 1700)    finasteride  5 mg Oral Daily    folic acid  1 mg Oral Daily    furosemide  20 mg Oral Daily    isosorbide mononitrate  30 mg Oral Daily    metoprolol tartrate  25 mg Oral BID    ranolazine  1,000 mg Oral BID    tamsulosin  0.4 mg Oral Daily    thiamine  100 mg Oral Daily      Continuous Infusions:   sodium chloride 0.9%   Intravenous Continuous 50 mL/hr at 05/20/24 1747 New Bag at 05/20/24 1747      PRN Meds:    Current Facility-Administered Medications:     acetaminophen, 1,000 mg, Oral, Q6H PRN    aluminum-magnesium hydroxide-simethicone, 30 mL, Oral, QID PRN    bisacodyL, 10 mg, Rectal, Daily PRN    dextrose 10%, 12.5 g, Intravenous, PRN    dextrose 10%, 25 g, Intravenous, PRN    glucagon (human recombinant), 1 mg, Intramuscular, PRN    glucose, 16 g, Oral, PRN    glucose, 24 g, Oral, PRN    LORazepam, 1 mg, Oral, Q4H PRN    melatonin, 6 mg, Oral, Nightly PRN    naloxone, 0.02 mg, Intravenous, PRN    ondansetron, 4 mg, Intravenous, Q4H PRN    prochlorperazine, 5 mg, Intravenous, Q6H PRN    senna-docusate 8.6-50 mg, 1 tablet, Oral, BID PRN    sodium chloride 0.9%, 10 mL, Intravenous, PRN     Assessment/Plan:  Possible syncope versus fall with concussion   Scalp laceration  History of coronary artery disease status post CABG  History of severe AS status post TAVR   Essential hypertension    Case management was consulted for rehab placement   Patient has been accepted for May 21st  MRI of the brain negative CT of the head negative  PT and OT recommending  inpatient rehab    Other home medications have been resumed   2D echo shows EF of 50-55% and transcatheter valve replacement in the aortic position  Carotid ultrasound no significant blockage  Prophylaxis:  Lovenox    VTE prophylaxis:     Patient condition:  Stable/Fair/Guarded/ Serious/ Critical    Anticipated discharge and Disposition:         All diagnosis and differential diagnosis have been reviewed; assessment and plan has been documented; I have personally reviewed the labs and test results that are presently available; I have reviewed the patients medication list; I have reviewed the consulting providers response and recommendations. I have reviewed or attempted to review medical records based upon their availability    All of the patient's questions have been  addressed and answered. Patient's is agreeable to the above stated plan. I will continue to monitor closely and make adjustments to medical management as needed.  _____________________________________________________________________    Nutrition Status:    Radiology:  I have personally reviewed the following imaging and agree with the radiologist.     Echo    Left Ventricle: The left ventricle is normal in size. Normal wall   thickness. There is low normal systolic function with a visually estimated   ejection fraction of 50 - 55%. Grade I diastolic dysfunction.    Right Ventricle: Normal right ventricular cavity size. Systolic   function is borderline low.    Aortic Valve: There is a transcatheter valve replacement in the aortic   position. There is aortic valve sclerosis. There is annular calcification   present. Aortic valve area by VTI is 1.25 cm². Aortic valve peak velocity   is 2.51 m/s. Mean gradient is 16 mmHg. The dimensionless index is 0.40.    Mitral Valve: There is no stenosis. The mean pressure gradient across   the mitral valve is 2 mmHg at a heart rate of  bpm. There is mild   regurgitation.    Tricuspid Valve: There is trace  regurgitation.    IVC/SVC: Normal venous pressure at 3 mmHg.      Anjelica Ho MD  Department of Hospital Medicine   [facility]   05/22/2024

## 2024-05-22 NOTE — PLAN OF CARE
PT states pt has agreed this am to go to an inpt rehab facility as he no realizes it would benefit him prior to d/c home.  Went to room - pt very [place] and stevo Collazo at bedside.  Discussed rehab placement and reviewed facilities- his choice is [facility] .  Questions answered for both pt and fmly member.  Pt states he did understand info given during conversation and agreeable to referral being sent.  Informed we will keep pt and fmly updated.  As pt experiences difficulty with phone conversations, he requests that Eula Landis # [phone] or Zay Landis # [phone] be contacted for questions and any info provided by rehab.  Referral sent and communication to Julia Jiang and Barbara Leslie.    1130  Rec'd communication from Barbara with [facility] that Dr. Haas rec. SNF.  I spoke with pt and he states as therapy rec. Inpt Rehab he would like to pursue that- given choices and referral sent to [facility].  Communication sent to Merlyn bangura.    1215  Merlyn communicated she has reviewed info and will come to speak with pt around 1 pm.  I left messages for Casi re: mtg time amd informed pt that Merlyn bangura will come to see him at 1300    1330 Merlyn claytonison with [facility] here to see pt- states can transfer tomorrow.  Rani AGUILAR updated

## 2024-05-22 NOTE — PROGRESS NOTES
[facility] 6[facility] Medical [facility]metry  Wound Care    Patient Name:  Wes Stratton   MRN:  [id number]  Date: 5/22/2024  Diagnosis: Syncope    History:     No past medical history on file.    Social History     Socioeconomic History    Marital status:        Precautions:     Allergies as of 05/20/2024    (No Known Allergies)       [facility] Assessment Details/Treatment      05/22/24 0916   WO Assessment   Visit Date 05/22/24   Visit Time 0916   Consult Type New   [facility] Speciality Wound   Intervention chart review;assessed;applied;orders   Teaching on-going        Wound 05/20/24 0330 Laceration medial Parietal region   Date First Assessed/Time First Assessed: 05/20/24 0330   Present on Original Admission: Yes  Primary Wound Type: Laceration  Orientation: medial  Location: Parietal region   Wound Image     Dressing Appearance Open to air   Drainage Amount None   Drainage Characteristics/Odor No odor   Appearance Red;Maroon;Purple;Dry;Sutures intact   Black (%), Wound Tissue Color 20 %   Red (%), Wound Tissue Color 70 %   Yellow (%), Wound Tissue Color 10 %   Periwound Area Intact;Dry;Ecchymotic;Pink;Swelling;Redness   Wound Edges Defined;Other (see comments)  (Sutures in place)   Care Cleansed with:;Soap and water     WO consulted for medial parietal area. Discussed plan of care with nurse Galvan. Introduced self and explained reason for visit, patient is hard of hearing but agreed to be seen. Family at bedside. Treatment recommendations in place. Head: Cleanse with soap and water, dry well. Leave open to air. Patient is okay to shower and continue on with other hygiene measures. Nursing to continue with treatment recommendations and other preventative measures. Patient demonstrated ability to mobilize self by ambulating to and from the bathroom. Educated on the importance of mobilizing when applicable and maintaining good hygiene regimen, he verbalized understanding. Will follow up.   05/22/2024

## 2024-05-22 NOTE — PT/OT/SLP PROGRESS
Occupational Therapy   Treatment    Name: Wes Stratton  MRN: [id number]  Admitting Diagnosis:  Syncope       Recommendations:     Recommended therapy intensity at discharge: High Intensity Therapy   Discharge Equipment Recommendations:  shower chair, bedside commode  Barriers to discharge:  Decreased caregiver support, Other (Comment) (impaired safety and mobility; impaired ability to complete ADLs compared to baseline)    Assessment:     Wes Stratton is a 89 y.o. male with a medical diagnosis of Syncope. Performance deficits affecting function are weakness, decreased safety awareness, impaired balance, impaired self care skills, impaired functional mobility, gait instability.     Rehab Prognosis:  Good; patient would benefit from acute skilled OT services to address these deficits and reach maximum level of function.       Plan:     Patient to be seen 5 x/week to address the above listed problems via self-care/home management, therapeutic activities, therapeutic exercises  Plan of Care Expires: 06/18/24  Plan of Care Reviewed with: patient    Subjective     Pain/Comfort:  Pain Rating 1: 0/10    Objective:     Communicated with: RN prior to session.  Patient found supine with Other (comments) (male external catheter) upon OT entry to room.    General Precautions: Standard, fall    Orthopedic Precautions:N/A  Braces: N/A  Respiratory Status: Room air     Occupational Performance:     Bed Mobility:    Patient completed Supine to Sit with stand by assistance     Functional Mobility/Transfers:  Patient completed Sit <> Stand Transfer with contact guard assistance  with  rolling walker   Patient completed Bed <> Chair Transfer using Step Transfer technique with contact guard assistance with rolling walker and verbal cueing for safe RW usage  Pt completed toilet transfer with CGA using RW and emphasis on safety utilizing RW and safe descent to toilet. Used grab bars to perform sit to stand from toilet.   Functional  Mobility: Pt ambulated in hallway with RW and CGA. Small CHIDI for turning at end of segura requiring CGA.     Activities of Daily Living:  Toileting: maximal assistance male external catheter in place    Therapeutic Positioning    OT interventions performed during the course of today's session in an effort to prevent and/or reduce acquired pressure injuries:   Education was provided on benefits of and recommendations for therapeutic positioning  Therapeutic positioning was provided at the conclusion of session to offload all bony prominences for the prevention and/or reduction of pressure injuries    [facility] 6 Click ADL:      Patient Education:  Patient provided with verbal education education regarding fall prevention, safety awareness, and Discharge/DME recommendations.  Understanding was verbalized, however additional teaching warranted.      Patient left up in chair with call button in reach.    GOALS:   Multidisciplinary Problems       Occupational Therapy Goals          Problem: Occupational Therapy    Goal Priority Disciplines Outcome Interventions   Occupational Therapy Goal     OT, PT/OT Progressing    Description: Goals to be met by 6/21/2024    Pt will complete grooming standing at sink with LRAD with modified independence.  Pt will complete UB dressing with modified independence.  Pt will complete LB dressing with modified independence using LRAD.  Pt will complete toileting with modified independence using LRAD.  Pt will complete functional mobility to/from toilet and toilet transfer with modified independence using LRAD.                         Time Tracking:     OT Date of Treatment: 05/22/24  OT Start Time: 1508  OT Stop Time: 1523  OT Total Time (min): 15 min    Billable Minutes:Self Care/Home Management 1 unit    OT/MARYANN: OT     Number of MARYANN visits since last OT visit: 1    5/22/2024

## 2024-05-22 NOTE — PT/OT/SLP PROGRESS
Physical Therapy Treatment    Patient Name:  Wes Stratton   MRN:  [id number]    Recommendations:     Discharge therapy intensity: High Intensity Therapy   Discharge Equipment Recommendations: bedside commode, shower chair  Barriers to discharge: Ongoing medical needs    Assessment:     Wes Stratton is a 89 y.o. male admitted with a medical diagnosis of ground level fall c LOC and scalp laceration s/p suturing .  He presents with the following impairments/functional limitations: impaired balance, decreased safety awareness.    Rehab Prognosis: Good; patient would benefit from acute skilled PT services to address these deficits and reach maximum level of function.    Recent Surgery: * No surgery found *      Plan:     During this hospitalization, patient would benefit from acute PT services 5 x/week to address the identified rehab impairments via gait training, therapeutic activities, therapeutic exercises and progress toward the following goals:    Plan of Care Expires:       Subjective     Chief Complaint: none stated  Patient/Family Comments/goals: none stated  Pain/Comfort:  Pain Rating 1: 0/10      Objective:     Communicated with nurse prior to session.  Patient found  on toilet  with   upon PT entry to room.     General Precautions: Standard, fall  Orthopedic Precautions:    Braces:    Respiratory Status: Room air  Blood Pressure: NT  Skin Integrity: Visible skin intact      Functional Mobility:  Bed Mobility:     Sit to Supine: contact guard assistance  Transfers:     Sit to Stand:  contact guard assistance and minimum assistance with rolling walker  Gait: patient ambulated ~100 ft with rolling walker with CGA with step through gait pattern. Patient presents with decreased step length and required verbal cues for safety especially during turns.    Therapeutic Activities/Exercises:  Patient performed standing marching x15 with no upper extremity support, x15 with upper extremity support with Sindy, and standing  heel raises with no upper extremity support x15    Education:  Patient provided with verbal education education regarding PT role/goals/POC, fall prevention, and safety awareness.  Understanding was verbalized.     Patient left HOB elevated with all lines intact and call button in reach    GOALS:   Multidisciplinary Problems       Physical Therapy Goals          Problem: Physical Therapy    Goal Priority Disciplines Outcome Goal Variances Interventions   Physical Therapy Goal     PT, PT/OT Progressing     Description: Goals to be met by: d/c    Patient will increase functional independence with mobility by performin. Sit to stand transfer with [place]  2. Gait  x 150 feet with [place] using Rolling Walker.   3. Improve dynamic standing balance to decrease fall risk.                          Time Tracking:     PT Received On: 24  PT Start Time: 1313     PT Stop Time: 1336  PT Total Time (min): 23 min     Billable Minutes: Gait Training 15 and Therapeutic Activity 8    Treatment Type: Treatment  PT/PTA: PTA     Number of PTA visits since last PT visit: 2024

## 2024-05-23 VITALS
BODY MASS INDEX: 24.34 KG/M2 | HEIGHT: 70 IN | WEIGHT: 170 LBS | DIASTOLIC BLOOD PRESSURE: 55 MMHG | TEMPERATURE: 98 F | SYSTOLIC BLOOD PRESSURE: 93 MMHG | HEART RATE: 64 BPM | OXYGEN SATURATION: 98 % | RESPIRATION RATE: 20 BRPM

## 2024-05-23 PROBLEM — R55 SYNCOPE: Status: RESOLVED | Noted: 2024-05-21 | Resolved: 2024-05-23

## 2024-05-23 LAB
OHS QRS DURATION: 136 MS
OHS QTC CALCULATION: 463 MS

## 2024-05-23 PROCEDURE — 25000003 PHARM REV CODE 250: Performed by: NURSE PRACTITIONER

## 2024-05-23 PROCEDURE — 25000003 PHARM REV CODE 250: Performed by: INTERNAL MEDICINE

## 2024-05-23 RX ORDER — CLOPIDOGREL BISULFATE 75 MG/1
75 TABLET ORAL DAILY
Status: DISCONTINUED | OUTPATIENT
Start: 2024-05-23 | End: 2024-05-23 | Stop reason: HOSPADM

## 2024-05-23 RX ORDER — FOLIC ACID 1 MG/1
1 TABLET ORAL DAILY
Start: 2024-05-23 | End: 2024-06-22

## 2024-05-23 RX ORDER — LANOLIN ALCOHOL/MO/W.PET/CERES
100 CREAM (GRAM) TOPICAL DAILY
Start: 2024-05-23 | End: 2024-06-22

## 2024-05-23 RX ADMIN — ATORVASTATIN CALCIUM 40 MG: 40 TABLET, FILM COATED ORAL at 09:05

## 2024-05-23 RX ADMIN — THIAMINE HCL TAB 100 MG 100 MG: 100 TAB at 09:05

## 2024-05-23 RX ADMIN — RANOLAZINE 1000 MG: 500 TABLET, EXTENDED RELEASE ORAL at 09:05

## 2024-05-23 RX ADMIN — ASPIRIN 81 MG: 81 TABLET, COATED ORAL at 09:05

## 2024-05-23 RX ADMIN — FOLIC ACID 1 MG: 1 TABLET ORAL at 09:05

## 2024-05-23 RX ADMIN — TAMSULOSIN HYDROCHLORIDE 0.4 MG: 0.4 CAPSULE ORAL at 09:05

## 2024-05-23 RX ADMIN — CLOPIDOGREL BISULFATE 75 MG: 75 TABLET ORAL at 09:05

## 2024-05-23 RX ADMIN — FUROSEMIDE 20 MG: 20 TABLET ORAL at 09:05

## 2024-05-23 RX ADMIN — FINASTERIDE 5 MG: 5 TABLET, FILM COATED ORAL at 09:05

## 2024-05-23 RX ADMIN — METOPROLOL TARTRATE 25 MG: 25 TABLET, FILM COATED ORAL at 09:05

## 2024-05-23 RX ADMIN — ISOSORBIDE MONONITRATE 30 MG: 30 TABLET, EXTENDED RELEASE ORAL at 09:05

## 2024-05-23 NOTE — PLAN OF CARE
05/23/24 1225   Final Note   Assessment Type Final Discharge Note   Anticipated Discharge Disposition Rehab   Post-Acute Status   Post-Acute Authorization Placement     Discharge to [facility]. Dr. Palmer is admitting physician. Nurse to call report to [phone]. Rehab will transport.

## 2024-05-23 NOTE — DISCHARGE SUMMARY
[facility] Hospital Medicine Discharge Summary    Admit Date: 5/20/2024  Discharge Date and Time: 5/23/20248:56 AM  Admitting Physician:  Team  Discharging Physician: Toni Guzman MD.  Primary Care Physician: Donta Easley MD  Consults: General Surgery    Discharge Diagnoses:  Possible syncope versus fall with concussion   Scalp laceration  History of coronary artery disease status post CABG  History of severe AS status post TAVR   Essential hypertension  Daily alcohol use     Hospital Course:   89 yr old male whose history includes CAD, VHD, HTN and BPH. At the time of my exam patient is AAO x 3. He is hard of hearing but provides a good history. Reports he's been in his usual state of health with no recent fever, vomiting, diarrhea, chest pain or SOB. However, when he woke this morning he had a laceration on his scalp and he was covered in blood. He has no recollection of any falls or injuries occurred during the night. Currently his speech is clear and he has no focal deficits.   VS on arrival: T 98, P 74, R 17, B/P 153/85, Sats 100% on room air. Initial labs unremarkable. CT head negative for acute intracranial findings. CT C-spine negative for acute fractures or dislocations. MRI brain shows moderate chronic microvascular ischemic changes but nothing acute. EKG SR with no concerning ST-T changes. Scalp laceration was sutured in ED.  MRI of the brain negative CT of the head negative  2D echo shows EF of 50-55% and transcatheter valve replacement in the aortic position  Carotid ultrasound no significant blockage  PT and OT recommending inpatient rehab   Case management on board, Pt accepted to [facility]    Pt was seen and examined on the day of discharge  Vitals:  VITAL SIGNS: 24 HRS MIN & MAX LAST   Temp  Min: 97.6 °F (36.4 °C)  Max: 98.5 °F (36.9 °C) 98.5 °F (36.9 °C)   BP  Min: 99/61  Max: 119/64 110/62   Pulse  Min: 65  Max: 84  71   Resp  Min: 18  Max: 18 18   SpO2  Min: 93 %   Max: 97 % 96 %       Physical Exam:  General: In no acute distress, afebrile  Chest: Clear to auscultation bilaterally  Heart: RRR, +S1, S2, no appreciable murmur  Abdomen: Soft, nontender, BS +  MSK: Warm, no lower extremity edema, no clubbing or cyanosis  Neurologic: Alert and awake    Recent Labs   Lab 05/20/24  0434   WBC 10.78   RBC 4.74   HGB 16.0   HCT 46.0   MCV 97.0*   MCH 33.8*   MCHC 34.8   RDW 12.8      MPV 10.2       Recent Labs   Lab 05/20/24  0434 05/21/24  0423    140   K 3.9 4.8   CO2 21* 22*   BUN 11.0 11.0   CREATININE 1.25* 1.07   CALCIUM 10.1* 8.8   ALBUMIN 4.1  --    ALKPHOS 63  --    ALT 18  --    AST 34  --    BILITOT 0.8  --         Microbiology Results (last 7 days)       ** No results found for the last 168 hours. **             Echo    Left Ventricle: The left ventricle is normal in size. Normal wall   thickness. There is low normal systolic function with a visually estimated   ejection fraction of 50 - 55%. Grade I diastolic dysfunction.    Right Ventricle: Normal right ventricular cavity size. Systolic   function is borderline low.    Aortic Valve: There is a transcatheter valve replacement in the aortic   position. There is aortic valve sclerosis. There is annular calcification   present. Aortic valve area by VTI is 1.25 cm². Aortic valve peak velocity   is 2.51 m/s. Mean gradient is 16 mmHg. The dimensionless index is 0.40.    Mitral Valve: There is no stenosis. The mean pressure gradient across   the mitral valve is 2 mmHg at a heart rate of  bpm. There is mild   regurgitation.    Tricuspid Valve: There is trace regurgitation.    IVC/SVC: Normal venous pressure at 3 mmHg.         Medication List        START taking these medications      folic acid 1 MG tablet  Commonly known as: FOLVITE  Take 1 tablet (1 mg total) by mouth once daily.     thiamine 100 MG tablet  Take 1 tablet (100 mg total) by mouth once daily.            CONTINUE taking these medications      aspirin  81 MG EC tablet  Commonly known as: ECOTRIN     atorvastatin 40 MG tablet  Commonly known as: LIPITOR     clopidogreL 75 mg tablet  Commonly known as: PLAVIX     famotidine 40 MG tablet  Commonly known as: PEPCID     finasteride 5 mg tablet  Commonly known as: PROSCAR     furosemide 20 MG tablet  Commonly known as: LASIX     isosorbide mononitrate 30 MG 24 hr tablet  Commonly known as: IMDUR     metoprolol tartrate 25 MG tablet  Commonly known as: LOPRESSOR     ranolazine 1,000 mg Psrg     tamsulosin 0.4 mg Cap  Commonly known as: FLOMAX               Where to Get Your Medications        Information about where to get these medications is not yet available    Ask your nurse or doctor about these medications  folic acid 1 MG tablet  thiamine 100 MG tablet          Explained in detail to the patient about the discharge plan, medications, and follow-up visits. Pt understands and agrees with the treatment plan  Discharge Disposition: [facility]  Discharged Condition: stable  Diet-   Dietary Orders (From admission, onward)       Start     Ordered    05/21/24 1526  Dietary nutrition supplements Boost Original Nutritional Drink - Any flavor; BID  Continuous        Question Answer Comment   Select PO Supplement: Boost Original Nutritional Drink - Any flavor    Frequency: BID        05/21/24 1526    05/20/24 1248  Diet Heart Healthy  Diet effective now         05/20/24 1247                   Medications Per DC med rec  Activities as tolerated   Follow-up Information       Donta Easley MD Follow up on 5/28/2024.    Specialty: Internal Medicine  Why: For suture removal-@ 10:00  * 14 sutures *  Contact information:  [address] [facility] [address]  [phone]               Donta Monk MD. Go on 5/29/2024.    Specialty: Cardiology  Why: @0840  Contact information:  [address] [facility] [address]  [phone]                           For further questions contact hospitalist office    Discharge time 34  minutes    For worsening symptoms, chest pain, shortness of breath, increased abdominal pain, high grade fever, stroke or stroke like symptoms, immediately go to the nearest Emergency Room or call 911 as soon as possible.    Portions of this note dictated using EMR integrated voice recognition software, and may be subject to voice recognition errors not corrected at proofreading. Please contact writer for clarification if needed    Toni Guzman MD  Department of Hospital Medicine   [facility]   05/23/2024 8:56 AM

## 2024-05-23 NOTE — PLAN OF CARE
Discharge packet sent to [facility] rehab via [facility].

## 2024-05-24 LAB
LEFT CCA DIST DIAS: 15 CM/S
LEFT CCA DIST SYS: 75 CM/S
LEFT CCA PROX DIAS: 11 CM/S
LEFT CCA PROX SYS: 90 CM/S
LEFT ECA DIAS: 0 CM/S
LEFT ECA SYS: 72 CM/S
LEFT ICA DIST DIAS: 16 CM/S
LEFT ICA DIST SYS: 61 CM/S
LEFT ICA MID DIAS: 9 CM/S
LEFT ICA MID SYS: 54 CM/S
LEFT ICA PROX DIAS: 9 CM/S
LEFT ICA PROX SYS: 36 CM/S
LEFT VERTEBRAL DIAS: 5 CM/S
LEFT VERTEBRAL SYS: 31 CM/S
OHS CV CAROTID RIGHT ICA EDV HIGHEST: 24
OHS CV CAROTID ULTRASOUND LEFT ICA/CCA RATIO: 0.81
OHS CV CAROTID ULTRASOUND RIGHT ICA/CCA RATIO: 1.35
OHS CV PV CAROTID LEFT HIGHEST CCA: 90
OHS CV PV CAROTID LEFT HIGHEST ICA: 61
OHS CV PV CAROTID RIGHT HIGHEST CCA: 77
OHS CV PV CAROTID RIGHT HIGHEST ICA: 104
OHS CV US CAROTID LEFT HIGHEST EDV: 16
RIGHT CCA DIST DIAS: 14 CM/S
RIGHT CCA DIST SYS: 77 CM/S
RIGHT CCA PROX DIAS: 2 CM/S
RIGHT CCA PROX SYS: 62 CM/S
RIGHT ECA DIAS: 0 CM/S
RIGHT ECA SYS: 80 CM/S
RIGHT ICA DIST DIAS: 24 CM/S
RIGHT ICA DIST SYS: 97 CM/S
RIGHT ICA MID DIAS: 24 CM/S
RIGHT ICA MID SYS: 104 CM/S
RIGHT ICA PROX DIAS: 13 CM/S
RIGHT ICA PROX SYS: 59 CM/S
RIGHT VERTEBRAL DIAS: 12 CM/S
RIGHT VERTEBRAL SYS: 50 CM/S

## 2024-07-24 ENCOUNTER — HOSPITAL ENCOUNTER (INPATIENT)
Facility: HOSPITAL | Age: 89
LOS: 13 days | Discharge: NURSING, REHAB OR LONG-TERM CARE | DRG: 884 | End: 2024-08-06
Attending: EMERGENCY MEDICINE | Admitting: INTERNAL MEDICINE
Payer: MEDICARE

## 2024-07-24 DIAGNOSIS — A41.9 SEPSIS, DUE TO UNSPECIFIED ORGANISM, UNSPECIFIED WHETHER ACUTE ORGAN DYSFUNCTION PRESENT: ICD-10-CM

## 2024-07-24 DIAGNOSIS — F01.C18 SEVERE VASCULAR DEMENTIA WITH OTHER BEHAVIORAL DISTURBANCE: ICD-10-CM

## 2024-07-24 DIAGNOSIS — R41.82 ALTERED MENTAL STATUS: Primary | ICD-10-CM

## 2024-07-24 DIAGNOSIS — R41.0 DELIRIUM: ICD-10-CM

## 2024-07-24 DIAGNOSIS — I63.9 STROKE: ICD-10-CM

## 2024-07-24 LAB
ALBUMIN SERPL-MCNC: 3.3 G/DL (ref 3.4–4.8)
ALBUMIN/GLOB SERPL: 1 RATIO (ref 1.1–2)
ALP SERPL-CCNC: 62 UNIT/L (ref 40–150)
ALT SERPL-CCNC: 33 UNIT/L (ref 0–55)
ANION GAP SERPL CALC-SCNC: 9 MEQ/L
AST SERPL-CCNC: 49 UNIT/L (ref 5–34)
BACTERIA #/AREA URNS AUTO: ABNORMAL /HPF
BASOPHILS # BLD AUTO: 0.14 X10(3)/MCL
BASOPHILS NFR BLD AUTO: 0.7 %
BILIRUB SERPL-MCNC: 1 MG/DL
BILIRUB UR QL STRIP.AUTO: NEGATIVE
BUN SERPL-MCNC: 23.1 MG/DL (ref 8.4–25.7)
CALCIUM SERPL-MCNC: 10.2 MG/DL (ref 8.8–10)
CHLORIDE SERPL-SCNC: 112 MMOL/L (ref 98–107)
CLARITY UR: CLEAR
CO2 SERPL-SCNC: 21 MMOL/L (ref 23–31)
COLOR UR AUTO: YELLOW
CREAT SERPL-MCNC: 1.11 MG/DL (ref 0.73–1.18)
CREAT/UREA NIT SERPL: 21
EOSINOPHIL # BLD AUTO: 0.14 X10(3)/MCL (ref 0–0.9)
EOSINOPHIL NFR BLD AUTO: 0.7 %
ERYTHROCYTE [DISTWIDTH] IN BLOOD BY AUTOMATED COUNT: 13.5 % (ref 11.5–17)
GFR SERPLBLD CREATININE-BSD FMLA CKD-EPI: >60 ML/MIN/1.73/M2
GLOBULIN SER-MCNC: 3.3 GM/DL (ref 2.4–3.5)
GLUCOSE SERPL-MCNC: 122 MG/DL (ref 82–115)
GLUCOSE UR QL STRIP: NORMAL
HCT VFR BLD AUTO: 45 % (ref 42–52)
HGB BLD-MCNC: 15.6 G/DL (ref 14–18)
HGB UR QL STRIP: NEGATIVE
HYALINE CASTS #/AREA URNS LPF: ABNORMAL /LPF
IMM GRANULOCYTES # BLD AUTO: 0.33 X10(3)/MCL (ref 0–0.04)
IMM GRANULOCYTES NFR BLD AUTO: 1.7 %
KETONES UR QL STRIP: ABNORMAL
LACTATE SERPL-SCNC: 3.1 MMOL/L (ref 0.5–2.2)
LACTATE SERPL-SCNC: 5 MMOL/L (ref 0.5–2.2)
LEUKOCYTE ESTERASE UR QL STRIP: NEGATIVE
LYMPHOCYTES # BLD AUTO: 2.34 X10(3)/MCL (ref 0.6–4.6)
LYMPHOCYTES NFR BLD AUTO: 12.2 %
MAGNESIUM SERPL-MCNC: 2.3 MG/DL (ref 1.6–2.6)
MCH RBC QN AUTO: 33.2 PG (ref 27–31)
MCHC RBC AUTO-ENTMCNC: 34.7 G/DL (ref 33–36)
MCV RBC AUTO: 95.7 FL (ref 80–94)
MONOCYTES # BLD AUTO: 1.56 X10(3)/MCL (ref 0.1–1.3)
MONOCYTES NFR BLD AUTO: 8.1 %
MUCOUS THREADS URNS QL MICRO: ABNORMAL /LPF
NEUTROPHILS # BLD AUTO: 14.71 X10(3)/MCL (ref 2.1–9.2)
NEUTROPHILS NFR BLD AUTO: 76.6 %
NITRITE UR QL STRIP: NEGATIVE
NRBC BLD AUTO-RTO: 0 %
OHS QRS DURATION: 134 MS
OHS QTC CALCULATION: 472 MS
PH UR STRIP: 5.5 [PH]
PLATELET # BLD AUTO: 259 X10(3)/MCL (ref 130–400)
PMV BLD AUTO: 10.8 FL (ref 7.4–10.4)
POTASSIUM SERPL-SCNC: 3.2 MMOL/L (ref 3.5–5.1)
PROT SERPL-MCNC: 6.6 GM/DL (ref 5.8–7.6)
PROT UR QL STRIP: ABNORMAL
RBC # BLD AUTO: 4.7 X10(6)/MCL (ref 4.7–6.1)
RBC #/AREA URNS AUTO: ABNORMAL /HPF
SODIUM SERPL-SCNC: 142 MMOL/L (ref 136–145)
SP GR UR STRIP.AUTO: 1.02 (ref 1–1.03)
SQUAMOUS #/AREA URNS LPF: ABNORMAL /HPF
UROBILINOGEN UR STRIP-ACNC: NORMAL
WBC # BLD AUTO: 19.22 X10(3)/MCL (ref 4.5–11.5)
WBC #/AREA URNS AUTO: ABNORMAL /HPF

## 2024-07-24 PROCEDURE — 83735 ASSAY OF MAGNESIUM: CPT | Performed by: EMERGENCY MEDICINE

## 2024-07-24 PROCEDURE — 83605 ASSAY OF LACTIC ACID: CPT | Performed by: EMERGENCY MEDICINE

## 2024-07-24 PROCEDURE — 96365 THER/PROPH/DIAG IV INF INIT: CPT

## 2024-07-24 PROCEDURE — 93005 ELECTROCARDIOGRAM TRACING: CPT

## 2024-07-24 PROCEDURE — 81001 URINALYSIS AUTO W/SCOPE: CPT | Performed by: EMERGENCY MEDICINE

## 2024-07-24 PROCEDURE — P9612 CATHETERIZE FOR URINE SPEC: HCPCS

## 2024-07-24 PROCEDURE — 21400001 HC TELEMETRY ROOM

## 2024-07-24 PROCEDURE — 63600175 PHARM REV CODE 636 W HCPCS: Performed by: EMERGENCY MEDICINE

## 2024-07-24 PROCEDURE — 99291 CRITICAL CARE FIRST HOUR: CPT

## 2024-07-24 PROCEDURE — 25000003 PHARM REV CODE 250: Performed by: EMERGENCY MEDICINE

## 2024-07-24 PROCEDURE — 63600175 PHARM REV CODE 636 W HCPCS: Performed by: INTERNAL MEDICINE

## 2024-07-24 PROCEDURE — 11000001 HC ACUTE MED/SURG PRIVATE ROOM

## 2024-07-24 PROCEDURE — 87040 BLOOD CULTURE FOR BACTERIA: CPT | Performed by: EMERGENCY MEDICINE

## 2024-07-24 PROCEDURE — 96361 HYDRATE IV INFUSION ADD-ON: CPT

## 2024-07-24 PROCEDURE — 80053 COMPREHEN METABOLIC PANEL: CPT | Performed by: EMERGENCY MEDICINE

## 2024-07-24 PROCEDURE — 93010 ELECTROCARDIOGRAM REPORT: CPT | Mod: ,,, | Performed by: INTERNAL MEDICINE

## 2024-07-24 PROCEDURE — 85025 COMPLETE CBC W/AUTO DIFF WBC: CPT | Performed by: EMERGENCY MEDICINE

## 2024-07-24 RX ORDER — ONDANSETRON HYDROCHLORIDE 2 MG/ML
4 INJECTION, SOLUTION INTRAVENOUS EVERY 8 HOURS PRN
Status: DISCONTINUED | OUTPATIENT
Start: 2024-07-25 | End: 2024-08-06 | Stop reason: HOSPADM

## 2024-07-24 RX ORDER — ACETAMINOPHEN 325 MG/1
650 TABLET ORAL EVERY 8 HOURS PRN
Status: DISCONTINUED | OUTPATIENT
Start: 2024-07-25 | End: 2024-08-06 | Stop reason: HOSPADM

## 2024-07-24 RX ORDER — SODIUM CHLORIDE 9 MG/ML
INJECTION, SOLUTION INTRAVENOUS CONTINUOUS
Status: DISCONTINUED | OUTPATIENT
Start: 2024-07-25 | End: 2024-07-26

## 2024-07-24 RX ORDER — HALOPERIDOL 5 MG/ML
5 INJECTION INTRAMUSCULAR EVERY 6 HOURS PRN
Status: DISCONTINUED | OUTPATIENT
Start: 2024-07-24 | End: 2024-07-25

## 2024-07-24 RX ORDER — TALC
6 POWDER (GRAM) TOPICAL NIGHTLY PRN
Status: DISCONTINUED | OUTPATIENT
Start: 2024-07-25 | End: 2024-08-06 | Stop reason: HOSPADM

## 2024-07-24 RX ORDER — SODIUM CHLORIDE 0.9 % (FLUSH) 0.9 %
10 SYRINGE (ML) INJECTION
Status: DISCONTINUED | OUTPATIENT
Start: 2024-07-25 | End: 2024-08-06 | Stop reason: HOSPADM

## 2024-07-24 RX ORDER — ENOXAPARIN SODIUM 100 MG/ML
40 INJECTION SUBCUTANEOUS EVERY 24 HOURS
Status: DISCONTINUED | OUTPATIENT
Start: 2024-07-25 | End: 2024-08-06 | Stop reason: HOSPADM

## 2024-07-24 RX ADMIN — SODIUM CHLORIDE, POTASSIUM CHLORIDE, SODIUM LACTATE AND CALCIUM CHLORIDE 1000 ML: 600; 310; 30; 20 INJECTION, SOLUTION INTRAVENOUS at 06:07

## 2024-07-24 RX ADMIN — SODIUM CHLORIDE, POTASSIUM CHLORIDE, SODIUM LACTATE AND CALCIUM CHLORIDE 1000 ML: 600; 310; 30; 20 INJECTION, SOLUTION INTRAVENOUS at 07:07

## 2024-07-24 RX ADMIN — HALOPERIDOL LACTATE 5 MG: 5 INJECTION, SOLUTION INTRAMUSCULAR at 10:07

## 2024-07-24 RX ADMIN — PIPERACILLIN AND TAZOBACTAM 4.5 G: 4; .5 INJECTION, POWDER, LYOPHILIZED, FOR SOLUTION INTRAVENOUS; PARENTERAL at 07:07

## 2024-07-24 RX ADMIN — SODIUM CHLORIDE, POTASSIUM CHLORIDE, SODIUM LACTATE AND CALCIUM CHLORIDE 301 ML: 600; 310; 30; 20 INJECTION, SOLUTION INTRAVENOUS at 07:07

## 2024-07-24 RX ADMIN — VANCOMYCIN HYDROCHLORIDE 1500 MG: 1.5 INJECTION, POWDER, LYOPHILIZED, FOR SOLUTION INTRAVENOUS at 09:07

## 2024-07-25 LAB
ALBUMIN SERPL-MCNC: 3.1 G/DL (ref 3.4–4.8)
ALBUMIN/GLOB SERPL: 1.2 RATIO (ref 1.1–2)
ALP SERPL-CCNC: 53 UNIT/L (ref 40–150)
ALT SERPL-CCNC: 35 UNIT/L (ref 0–55)
AMMONIA PLAS-MSCNC: 18.8 UMOL/L (ref 18–72)
ANION GAP SERPL CALC-SCNC: 9 MEQ/L
AST SERPL-CCNC: 66 UNIT/L (ref 5–34)
BASOPHILS # BLD AUTO: 0.16 X10(3)/MCL
BASOPHILS NFR BLD AUTO: 1 %
BILIRUB SERPL-MCNC: 1.5 MG/DL
BUN SERPL-MCNC: 19.2 MG/DL (ref 8.4–25.7)
CALCIUM SERPL-MCNC: 9.6 MG/DL (ref 8.8–10)
CHLORIDE SERPL-SCNC: 112 MMOL/L (ref 98–107)
CO2 SERPL-SCNC: 20 MMOL/L (ref 23–31)
CREAT SERPL-MCNC: 0.97 MG/DL (ref 0.73–1.18)
CREAT/UREA NIT SERPL: 20
EOSINOPHIL # BLD AUTO: 0.38 X10(3)/MCL (ref 0–0.9)
EOSINOPHIL NFR BLD AUTO: 2.3 %
ERYTHROCYTE [DISTWIDTH] IN BLOOD BY AUTOMATED COUNT: 13.2 % (ref 11.5–17)
FLUAV AG UPPER RESP QL IA.RAPID: NOT DETECTED
FLUBV AG UPPER RESP QL IA.RAPID: NOT DETECTED
GFR SERPLBLD CREATININE-BSD FMLA CKD-EPI: >60 ML/MIN/1.73/M2
GLOBULIN SER-MCNC: 2.6 GM/DL (ref 2.4–3.5)
GLUCOSE SERPL-MCNC: 103 MG/DL (ref 82–115)
HCT VFR BLD AUTO: 41.5 % (ref 42–52)
HGB BLD-MCNC: 14.6 G/DL (ref 14–18)
IMM GRANULOCYTES # BLD AUTO: 0.29 X10(3)/MCL (ref 0–0.04)
IMM GRANULOCYTES NFR BLD AUTO: 1.7 %
LACTATE SERPL-SCNC: 1.7 MMOL/L (ref 0.5–2.2)
LYMPHOCYTES # BLD AUTO: 3.09 X10(3)/MCL (ref 0.6–4.6)
LYMPHOCYTES NFR BLD AUTO: 18.6 %
MCH RBC QN AUTO: 33.2 PG (ref 27–31)
MCHC RBC AUTO-ENTMCNC: 35.2 G/DL (ref 33–36)
MCV RBC AUTO: 94.3 FL (ref 80–94)
MONOCYTES # BLD AUTO: 1.48 X10(3)/MCL (ref 0.1–1.3)
MONOCYTES NFR BLD AUTO: 8.9 %
NEUTROPHILS # BLD AUTO: 11.25 X10(3)/MCL (ref 2.1–9.2)
NEUTROPHILS NFR BLD AUTO: 67.5 %
NRBC BLD AUTO-RTO: 0 %
PLATELET # BLD AUTO: 213 X10(3)/MCL (ref 130–400)
PMV BLD AUTO: 10.6 FL (ref 7.4–10.4)
POTASSIUM SERPL-SCNC: 3.2 MMOL/L (ref 3.5–5.1)
PROT SERPL-MCNC: 5.7 GM/DL (ref 5.8–7.6)
RBC # BLD AUTO: 4.4 X10(6)/MCL (ref 4.7–6.1)
RSV A 5' UTR RNA NPH QL NAA+PROBE: NOT DETECTED
SARS-COV-2 RNA RESP QL NAA+PROBE: NOT DETECTED
SODIUM SERPL-SCNC: 141 MMOL/L (ref 136–145)
WBC # BLD AUTO: 16.65 X10(3)/MCL (ref 4.5–11.5)

## 2024-07-25 PROCEDURE — 63600175 PHARM REV CODE 636 W HCPCS: Performed by: INTERNAL MEDICINE

## 2024-07-25 PROCEDURE — 80053 COMPREHEN METABOLIC PANEL: CPT | Performed by: INTERNAL MEDICINE

## 2024-07-25 PROCEDURE — 0241U COVID/RSV/FLU A&B PCR: CPT | Performed by: INTERNAL MEDICINE

## 2024-07-25 PROCEDURE — 25000003 PHARM REV CODE 250: Performed by: INTERNAL MEDICINE

## 2024-07-25 PROCEDURE — 36415 COLL VENOUS BLD VENIPUNCTURE: CPT | Performed by: INTERNAL MEDICINE

## 2024-07-25 PROCEDURE — 21400001 HC TELEMETRY ROOM

## 2024-07-25 PROCEDURE — 83605 ASSAY OF LACTIC ACID: CPT | Performed by: INTERNAL MEDICINE

## 2024-07-25 PROCEDURE — 92610 EVALUATE SWALLOWING FUNCTION: CPT

## 2024-07-25 PROCEDURE — 85025 COMPLETE CBC W/AUTO DIFF WBC: CPT | Performed by: INTERNAL MEDICINE

## 2024-07-25 PROCEDURE — 82140 ASSAY OF AMMONIA: CPT | Performed by: INTERNAL MEDICINE

## 2024-07-25 RX ORDER — POTASSIUM CHLORIDE 14.9 MG/ML
20 INJECTION INTRAVENOUS ONCE
Status: COMPLETED | OUTPATIENT
Start: 2024-07-25 | End: 2024-07-25

## 2024-07-25 RX ORDER — HALOPERIDOL 5 MG/ML
5 INJECTION INTRAMUSCULAR EVERY 4 HOURS PRN
Status: DISCONTINUED | OUTPATIENT
Start: 2024-07-25 | End: 2024-08-06 | Stop reason: HOSPADM

## 2024-07-25 RX ADMIN — POTASSIUM CHLORIDE 20 MEQ: 14.9 INJECTION, SOLUTION INTRAVENOUS at 09:07

## 2024-07-25 RX ADMIN — ENOXAPARIN SODIUM 40 MG: 40 INJECTION SUBCUTANEOUS at 05:07

## 2024-07-25 RX ADMIN — SODIUM CHLORIDE: 9 INJECTION, SOLUTION INTRAVENOUS at 12:07

## 2024-07-25 RX ADMIN — HALOPERIDOL LACTATE 5 MG: 5 INJECTION, SOLUTION INTRAMUSCULAR at 02:07

## 2024-07-25 RX ADMIN — SODIUM CHLORIDE: 9 INJECTION, SOLUTION INTRAVENOUS at 08:07

## 2024-07-26 LAB
ANION GAP SERPL CALC-SCNC: 6 MEQ/L
ANION GAP SERPL CALC-SCNC: 6 MEQ/L
BASOPHILS # BLD AUTO: 0.1 X10(3)/MCL
BASOPHILS NFR BLD AUTO: 0.9 %
BUN SERPL-MCNC: 8.1 MG/DL (ref 8.4–25.7)
BUN SERPL-MCNC: 9.2 MG/DL (ref 8.4–25.7)
CALCIUM SERPL-MCNC: 6.5 MG/DL (ref 8.8–10)
CALCIUM SERPL-MCNC: 9.2 MG/DL (ref 8.8–10)
CHLORIDE SERPL-SCNC: 116 MMOL/L (ref 98–107)
CHLORIDE SERPL-SCNC: 125 MMOL/L (ref 98–107)
CO2 SERPL-SCNC: 14 MMOL/L (ref 23–31)
CO2 SERPL-SCNC: 22 MMOL/L (ref 23–31)
CREAT SERPL-MCNC: 0.64 MG/DL (ref 0.73–1.18)
CREAT SERPL-MCNC: 0.83 MG/DL (ref 0.73–1.18)
CREAT/UREA NIT SERPL: 11
CREAT/UREA NIT SERPL: 13
EOSINOPHIL # BLD AUTO: 0.29 X10(3)/MCL (ref 0–0.9)
EOSINOPHIL NFR BLD AUTO: 2.7 %
ERYTHROCYTE [DISTWIDTH] IN BLOOD BY AUTOMATED COUNT: 13.4 % (ref 11.5–17)
GFR SERPLBLD CREATININE-BSD FMLA CKD-EPI: >60 ML/MIN/1.73/M2
GFR SERPLBLD CREATININE-BSD FMLA CKD-EPI: >60 ML/MIN/1.73/M2
GLUCOSE SERPL-MCNC: 104 MG/DL (ref 82–115)
GLUCOSE SERPL-MCNC: 76 MG/DL (ref 82–115)
HCT VFR BLD AUTO: 34.5 % (ref 42–52)
HGB BLD-MCNC: 11.2 G/DL (ref 14–18)
IMM GRANULOCYTES # BLD AUTO: 0.31 X10(3)/MCL (ref 0–0.04)
IMM GRANULOCYTES NFR BLD AUTO: 2.9 %
LYMPHOCYTES # BLD AUTO: 2.02 X10(3)/MCL (ref 0.6–4.6)
LYMPHOCYTES NFR BLD AUTO: 18.7 %
MCH RBC QN AUTO: 33.2 PG (ref 27–31)
MCHC RBC AUTO-ENTMCNC: 32.5 G/DL (ref 33–36)
MCV RBC AUTO: 102.4 FL (ref 80–94)
MONOCYTES # BLD AUTO: 1.07 X10(3)/MCL (ref 0.1–1.3)
MONOCYTES NFR BLD AUTO: 9.9 %
NEUTROPHILS # BLD AUTO: 7.01 X10(3)/MCL (ref 2.1–9.2)
NEUTROPHILS NFR BLD AUTO: 64.9 %
NRBC BLD AUTO-RTO: 0 %
PLATELET # BLD AUTO: 152 X10(3)/MCL (ref 130–400)
PMV BLD AUTO: 10.8 FL (ref 7.4–10.4)
POTASSIUM SERPL-SCNC: 2.6 MMOL/L (ref 3.5–5.1)
POTASSIUM SERPL-SCNC: 2.9 MMOL/L (ref 3.5–5.1)
RBC # BLD AUTO: 3.37 X10(6)/MCL (ref 4.7–6.1)
SODIUM SERPL-SCNC: 144 MMOL/L (ref 136–145)
SODIUM SERPL-SCNC: 145 MMOL/L (ref 136–145)
WBC # BLD AUTO: 10.8 X10(3)/MCL (ref 4.5–11.5)

## 2024-07-26 PROCEDURE — 63600175 PHARM REV CODE 636 W HCPCS: Performed by: INTERNAL MEDICINE

## 2024-07-26 PROCEDURE — 25000003 PHARM REV CODE 250: Performed by: INTERNAL MEDICINE

## 2024-07-26 PROCEDURE — 94760 N-INVAS EAR/PLS OXIMETRY 1: CPT

## 2024-07-26 PROCEDURE — 99900035 HC TECH TIME PER 15 MIN (STAT)

## 2024-07-26 PROCEDURE — 99900031 HC PATIENT EDUCATION (STAT)

## 2024-07-26 PROCEDURE — 36415 COLL VENOUS BLD VENIPUNCTURE: CPT | Performed by: INTERNAL MEDICINE

## 2024-07-26 PROCEDURE — 27000221 HC OXYGEN, UP TO 24 HOURS

## 2024-07-26 PROCEDURE — 85025 COMPLETE CBC W/AUTO DIFF WBC: CPT | Performed by: INTERNAL MEDICINE

## 2024-07-26 PROCEDURE — 21400001 HC TELEMETRY ROOM

## 2024-07-26 PROCEDURE — 80048 BASIC METABOLIC PNL TOTAL CA: CPT | Performed by: INTERNAL MEDICINE

## 2024-07-26 RX ORDER — POTASSIUM CHLORIDE 14.9 MG/ML
40 INJECTION INTRAVENOUS ONCE
Status: DISCONTINUED | OUTPATIENT
Start: 2024-07-26 | End: 2024-07-26

## 2024-07-26 RX ORDER — POTASSIUM CHLORIDE 20 MEQ/1
40 TABLET, EXTENDED RELEASE ORAL
Status: DISCONTINUED | OUTPATIENT
Start: 2024-07-26 | End: 2024-07-26

## 2024-07-26 RX ORDER — DEXTROSE MONOHYDRATE 50 MG/ML
INJECTION, SOLUTION INTRAVENOUS CONTINUOUS
Status: DISCONTINUED | OUTPATIENT
Start: 2024-07-26 | End: 2024-08-01

## 2024-07-26 RX ORDER — CALCIUM GLUCONATE 20 MG/ML
1 INJECTION, SOLUTION INTRAVENOUS ONCE
Status: COMPLETED | OUTPATIENT
Start: 2024-07-26 | End: 2024-07-26

## 2024-07-26 RX ORDER — POTASSIUM CHLORIDE 14.9 MG/ML
20 INJECTION INTRAVENOUS
Status: DISPENSED | OUTPATIENT
Start: 2024-07-26 | End: 2024-07-26

## 2024-07-26 RX ORDER — POTASSIUM CHLORIDE 14.9 MG/ML
40 INJECTION INTRAVENOUS ONCE
Status: COMPLETED | OUTPATIENT
Start: 2024-07-26 | End: 2024-07-26

## 2024-07-26 RX ORDER — POTASSIUM CHLORIDE 20 MEQ/1
40 TABLET, EXTENDED RELEASE ORAL ONCE
Status: COMPLETED | OUTPATIENT
Start: 2024-07-26 | End: 2024-07-26

## 2024-07-26 RX ORDER — CALCIUM CARBONATE 200(500)MG
1000 TABLET,CHEWABLE ORAL 2 TIMES DAILY
Status: DISCONTINUED | OUTPATIENT
Start: 2024-07-26 | End: 2024-08-06 | Stop reason: HOSPADM

## 2024-07-26 RX ORDER — POTASSIUM CHLORIDE 20 MEQ/1
40 TABLET, EXTENDED RELEASE ORAL ONCE
Status: DISCONTINUED | OUTPATIENT
Start: 2024-07-26 | End: 2024-07-26

## 2024-07-26 RX ORDER — SODIUM CHLORIDE AND POTASSIUM CHLORIDE 300; 900 MG/100ML; MG/100ML
INJECTION, SOLUTION INTRAVENOUS ONCE
Status: DISCONTINUED | OUTPATIENT
Start: 2024-07-26 | End: 2024-07-26

## 2024-07-26 RX ADMIN — ENOXAPARIN SODIUM 40 MG: 40 INJECTION SUBCUTANEOUS at 05:07

## 2024-07-26 RX ADMIN — DEXTROSE MONOHYDRATE: 50 INJECTION, SOLUTION INTRAVENOUS at 10:07

## 2024-07-26 RX ADMIN — POTASSIUM CHLORIDE 40 MEQ: 1500 TABLET, EXTENDED RELEASE ORAL at 05:07

## 2024-07-26 RX ADMIN — SODIUM BICARBONATE: 84 INJECTION, SOLUTION INTRAVENOUS at 09:07

## 2024-07-26 RX ADMIN — POTASSIUM CHLORIDE 40 MEQ: 14.9 INJECTION, SOLUTION INTRAVENOUS at 05:07

## 2024-07-26 RX ADMIN — CALCIUM CARBONATE (ANTACID) CHEW TAB 500 MG 1000 MG: 500 CHEW TAB at 05:07

## 2024-07-26 RX ADMIN — CALCIUM CARBONATE (ANTACID) CHEW TAB 500 MG 1000 MG: 500 CHEW TAB at 10:07

## 2024-07-26 RX ADMIN — POTASSIUM BICARBONATE 50 MEQ: 977.5 TABLET, EFFERVESCENT ORAL at 10:07

## 2024-07-26 RX ADMIN — CEFTRIAXONE SODIUM 1 G: 1 INJECTION, POWDER, FOR SOLUTION INTRAMUSCULAR; INTRAVENOUS at 01:07

## 2024-07-26 RX ADMIN — CALCIUM GLUCONATE 1 G: 20 INJECTION, SOLUTION INTRAVENOUS at 05:07

## 2024-07-27 LAB
25(OH)D3+25(OH)D2 SERPL-MCNC: 20 NG/ML (ref 30–80)
ALBUMIN SERPL-MCNC: 2.9 G/DL (ref 3.4–4.8)
ALBUMIN/GLOB SERPL: 1.3 RATIO (ref 1.1–2)
ALLENS TEST BLOOD GAS (OHS): YES
ALP SERPL-CCNC: 55 UNIT/L (ref 40–150)
ALT SERPL-CCNC: 33 UNIT/L (ref 0–55)
ANION GAP SERPL CALC-SCNC: 6 MEQ/L
AST SERPL-CCNC: 58 UNIT/L (ref 5–34)
BASE EXCESS BLD CALC-SCNC: 2.1 MMOL/L (ref -2–2)
BASOPHILS # BLD AUTO: 0.12 X10(3)/MCL
BASOPHILS NFR BLD AUTO: 0.9 %
BILIRUB SERPL-MCNC: 1.2 MG/DL
BLOOD GAS SAMPLE TYPE (OHS): ABNORMAL
BUN SERPL-MCNC: 7.2 MG/DL (ref 8.4–25.7)
CA-I BLD-SCNC: 1.26 MMOL/L (ref 1.12–1.23)
CALCIUM SERPL-MCNC: 9.4 MG/DL (ref 8.8–10)
CHLORIDE SERPL-SCNC: 111 MMOL/L (ref 98–107)
CO2 BLDA-SCNC: 23.2 MMOL/L
CO2 SERPL-SCNC: 26 MMOL/L (ref 23–31)
COHGB MFR BLDA: 3 % (ref 0.5–1.5)
CREAT SERPL-MCNC: 0.8 MG/DL (ref 0.73–1.18)
CREAT/UREA NIT SERPL: 9
DRAWN BY BLOOD GAS (OHS): ABNORMAL
EOSINOPHIL # BLD AUTO: 0.51 X10(3)/MCL (ref 0–0.9)
EOSINOPHIL NFR BLD AUTO: 3.6 %
ERYTHROCYTE [DISTWIDTH] IN BLOOD BY AUTOMATED COUNT: 13.6 % (ref 11.5–17)
GFR SERPLBLD CREATININE-BSD FMLA CKD-EPI: >60 ML/MIN/1.73/M2
GLOBULIN SER-MCNC: 2.3 GM/DL (ref 2.4–3.5)
GLUCOSE SERPL-MCNC: 106 MG/DL (ref 82–115)
HCO3 BLDA-SCNC: 22.5 MMOL/L (ref 22–26)
HCT VFR BLD AUTO: 41.5 % (ref 42–52)
HGB BLD-MCNC: 14.3 G/DL (ref 14–18)
IMM GRANULOCYTES # BLD AUTO: 0.25 X10(3)/MCL (ref 0–0.04)
IMM GRANULOCYTES NFR BLD AUTO: 1.8 %
INHALED O2 CONCENTRATION: 21 %
LYMPHOCYTES # BLD AUTO: 2.8 X10(3)/MCL (ref 0.6–4.6)
LYMPHOCYTES NFR BLD AUTO: 20 %
MAGNESIUM SERPL-MCNC: 1.8 MG/DL (ref 1.6–2.6)
MCH RBC QN AUTO: 32.9 PG (ref 27–31)
MCHC RBC AUTO-ENTMCNC: 34.5 G/DL (ref 33–36)
MCV RBC AUTO: 95.6 FL (ref 80–94)
METHGB MFR BLDA: 0.8 % (ref 0.4–1.5)
MONOCYTES # BLD AUTO: 1.46 X10(3)/MCL (ref 0.1–1.3)
MONOCYTES NFR BLD AUTO: 10.4 %
NEUTROPHILS # BLD AUTO: 8.88 X10(3)/MCL (ref 2.1–9.2)
NEUTROPHILS NFR BLD AUTO: 63.3 %
NRBC BLD AUTO-RTO: 0 %
O2 HB BLOOD GAS (OHS): 96 % (ref 94–97)
OXYHGB MFR BLDA: 13.9 G/DL (ref 12–16)
PCO2 BLDA: 24 MMHG (ref 35–45)
PH BLDA: 7.58 [PH] (ref 7.35–7.45)
PHOSPHATE SERPL-MCNC: 1.7 MG/DL (ref 2.3–4.7)
PLATELET # BLD AUTO: 180 X10(3)/MCL (ref 130–400)
PMV BLD AUTO: 10.9 FL (ref 7.4–10.4)
PO2 BLDA: 84 MMHG (ref 80–100)
POTASSIUM BLOOD GAS (OHS): 3.2 MMOL/L (ref 3.5–5)
POTASSIUM SERPL-SCNC: 3.4 MMOL/L (ref 3.5–5.1)
PROT SERPL-MCNC: 5.2 GM/DL (ref 5.8–7.6)
RBC # BLD AUTO: 4.34 X10(6)/MCL (ref 4.7–6.1)
SAMPLE SITE BLOOD GAS (OHS): ABNORMAL
SAO2 % BLDA: 97.7 %
SODIUM BLOOD GAS (OHS): 136 MMOL/L (ref 137–145)
SODIUM SERPL-SCNC: 143 MMOL/L (ref 136–145)
WBC # BLD AUTO: 14.02 X10(3)/MCL (ref 4.5–11.5)

## 2024-07-27 PROCEDURE — 94760 N-INVAS EAR/PLS OXIMETRY 1: CPT | Mod: XB

## 2024-07-27 PROCEDURE — 99900031 HC PATIENT EDUCATION (STAT)

## 2024-07-27 PROCEDURE — 36415 COLL VENOUS BLD VENIPUNCTURE: CPT | Performed by: INTERNAL MEDICINE

## 2024-07-27 PROCEDURE — 99900035 HC TECH TIME PER 15 MIN (STAT)

## 2024-07-27 PROCEDURE — 63600175 PHARM REV CODE 636 W HCPCS: Performed by: INTERNAL MEDICINE

## 2024-07-27 PROCEDURE — 85025 COMPLETE CBC W/AUTO DIFF WBC: CPT | Performed by: INTERNAL MEDICINE

## 2024-07-27 PROCEDURE — 25000003 PHARM REV CODE 250: Performed by: INTERNAL MEDICINE

## 2024-07-27 PROCEDURE — 82803 BLOOD GASES ANY COMBINATION: CPT

## 2024-07-27 PROCEDURE — 80053 COMPREHEN METABOLIC PANEL: CPT | Performed by: INTERNAL MEDICINE

## 2024-07-27 PROCEDURE — 36600 WITHDRAWAL OF ARTERIAL BLOOD: CPT

## 2024-07-27 PROCEDURE — 21400001 HC TELEMETRY ROOM

## 2024-07-27 PROCEDURE — 84100 ASSAY OF PHOSPHORUS: CPT | Performed by: INTERNAL MEDICINE

## 2024-07-27 PROCEDURE — 82306 VITAMIN D 25 HYDROXY: CPT | Performed by: INTERNAL MEDICINE

## 2024-07-27 PROCEDURE — 83735 ASSAY OF MAGNESIUM: CPT | Performed by: INTERNAL MEDICINE

## 2024-07-27 RX ORDER — SODIUM,POTASSIUM PHOSPHATES 280-250MG
1 POWDER IN PACKET (EA) ORAL
Status: COMPLETED | OUTPATIENT
Start: 2024-07-27 | End: 2024-07-29

## 2024-07-27 RX ORDER — ERGOCALCIFEROL 1.25 MG/1
50000 CAPSULE ORAL
Status: DISCONTINUED | OUTPATIENT
Start: 2024-07-27 | End: 2024-08-06 | Stop reason: HOSPADM

## 2024-07-27 RX ADMIN — CEFTRIAXONE SODIUM 1 G: 1 INJECTION, POWDER, FOR SOLUTION INTRAMUSCULAR; INTRAVENOUS at 12:07

## 2024-07-27 RX ADMIN — POTASSIUM & SODIUM PHOSPHATES POWDER PACK 280-160-250 MG 1 PACKET: 280-160-250 PACK at 12:07

## 2024-07-27 RX ADMIN — POTASSIUM & SODIUM PHOSPHATES POWDER PACK 280-160-250 MG 1 PACKET: 280-160-250 PACK at 05:07

## 2024-07-27 RX ADMIN — CALCIUM CARBONATE (ANTACID) CHEW TAB 500 MG 1000 MG: 500 CHEW TAB at 12:07

## 2024-07-27 RX ADMIN — ERGOCALCIFEROL 50000 UNITS: 1.25 CAPSULE ORAL at 12:07

## 2024-07-27 RX ADMIN — DEXTROSE MONOHYDRATE: 50 INJECTION, SOLUTION INTRAVENOUS at 12:07

## 2024-07-27 RX ADMIN — POTASSIUM & SODIUM PHOSPHATES POWDER PACK 280-160-250 MG 1 PACKET: 280-160-250 PACK at 09:07

## 2024-07-27 RX ADMIN — CALCIUM CARBONATE (ANTACID) CHEW TAB 500 MG 1000 MG: 500 CHEW TAB at 09:07

## 2024-07-27 RX ADMIN — POTASSIUM BICARBONATE 50 MEQ: 977.5 TABLET, EFFERVESCENT ORAL at 12:07

## 2024-07-27 RX ADMIN — ENOXAPARIN SODIUM 40 MG: 40 INJECTION SUBCUTANEOUS at 05:07

## 2024-07-28 PROBLEM — F01.50 VASCULAR DEMENTIA: Status: ACTIVE | Noted: 2024-07-28

## 2024-07-28 PROBLEM — G93.40 ENCEPHALOPATHY: Status: ACTIVE | Noted: 2024-07-28

## 2024-07-28 PROBLEM — R41.82 ALTERED MENTAL STATUS: Status: ACTIVE | Noted: 2024-07-28

## 2024-07-28 LAB
ALBUMIN SERPL-MCNC: 2.8 G/DL (ref 3.4–4.8)
ALBUMIN/GLOB SERPL: 0.9 RATIO (ref 1.1–2)
ALP SERPL-CCNC: 55 UNIT/L (ref 40–150)
ALT SERPL-CCNC: 31 UNIT/L (ref 0–55)
ANION GAP SERPL CALC-SCNC: 9 MEQ/L
APICAL FOUR CHAMBER EJECTION FRACTION: 60 %
AST SERPL-CCNC: 51 UNIT/L (ref 5–34)
AV INDEX (PROSTH): 0.41
AV MEAN GRADIENT: 17 MMHG
AV PEAK GRADIENT: 31 MMHG
AV VALVE AREA BY VELOCITY RATIO: 1.35 CM²
AV VALVE AREA: 1.28 CM²
AV VELOCITY RATIO: 0.43
BASOPHILS # BLD AUTO: 0.13 X10(3)/MCL
BASOPHILS NFR BLD AUTO: 0.9 %
BILIRUB SERPL-MCNC: 1.1 MG/DL
BSA FOR ECHO PROCEDURE: 1.95 M2
BUN SERPL-MCNC: 7.5 MG/DL (ref 8.4–25.7)
CALCIUM SERPL-MCNC: 9.9 MG/DL (ref 8.8–10)
CHLORIDE SERPL-SCNC: 110 MMOL/L (ref 98–107)
CHOLEST SERPL-MCNC: 193 MG/DL
CHOLEST/HDLC SERPL: 5 {RATIO} (ref 0–5)
CO2 SERPL-SCNC: 21 MMOL/L (ref 23–31)
CREAT SERPL-MCNC: 0.8 MG/DL (ref 0.73–1.18)
CREAT/UREA NIT SERPL: 9
DOP CALC AO PEAK VEL: 2.8 M/S
DOP CALC AO VTI: 50.4 CM
DOP CALC LVOT AREA: 3.1 CM2
DOP CALC LVOT DIAMETER: 2 CM
DOP CALC LVOT PEAK VEL: 1.2 M/S
DOP CALC LVOT STROKE VOLUME: 64.37 CM3
DOP CALC MV VTI: 20.3 CM
DOP CALCLVOT PEAK VEL VTI: 20.5 CM
E WAVE DECELERATION TIME: 198 MSEC
E/A RATIO: 0.58
E/E' RATIO: 14.67 M/S
EOSINOPHIL # BLD AUTO: 0.38 X10(3)/MCL (ref 0–0.9)
EOSINOPHIL NFR BLD AUTO: 2.7 %
ERYTHROCYTE [DISTWIDTH] IN BLOOD BY AUTOMATED COUNT: 13.4 % (ref 11.5–17)
GFR SERPLBLD CREATININE-BSD FMLA CKD-EPI: >60 ML/MIN/1.73/M2
GLOBULIN SER-MCNC: 3 GM/DL (ref 2.4–3.5)
GLUCOSE SERPL-MCNC: 111 MG/DL (ref 82–115)
HCT VFR BLD AUTO: 43 % (ref 42–52)
HDLC SERPL-MCNC: 38 MG/DL (ref 35–60)
HGB BLD-MCNC: 15.1 G/DL (ref 14–18)
IMM GRANULOCYTES # BLD AUTO: 0.37 X10(3)/MCL (ref 0–0.04)
IMM GRANULOCYTES NFR BLD AUTO: 2.6 %
LDLC SERPL CALC-MCNC: 131 MG/DL (ref 50–140)
LEFT ATRIUM AREA SYSTOLIC (APICAL 4 CHAMBER): 10.8 CM2
LEFT ATRIUM SIZE: 3.3 CM
LEFT VENTRICLE END DIASTOLIC VOLUME APICAL 4 CHAMBER: 76.7 ML
LEFT VENTRICLE END SYSTOLIC VOLUME APICAL 4 CHAMBER: 19.9 ML
LV LATERAL E/E' RATIO: 13.2 M/S
LV SEPTAL E/E' RATIO: 16.5 M/S
LVOT MG: 3 MMHG
LVOT MV: 0.78 CM/S
LYMPHOCYTES # BLD AUTO: 2.77 X10(3)/MCL (ref 0.6–4.6)
LYMPHOCYTES NFR BLD AUTO: 19.4 %
MCH RBC QN AUTO: 32.8 PG (ref 27–31)
MCHC RBC AUTO-ENTMCNC: 35.1 G/DL (ref 33–36)
MCV RBC AUTO: 93.3 FL (ref 80–94)
MONOCYTES # BLD AUTO: 1.74 X10(3)/MCL (ref 0.1–1.3)
MONOCYTES NFR BLD AUTO: 12.2 %
MV MEAN GRADIENT: 2 MMHG
MV PEAK A VEL: 1.14 M/S
MV PEAK E VEL: 0.66 M/S
MV PEAK GRADIENT: 5 MMHG
MV STENOSIS PRESSURE HALF TIME: 36 MS
MV VALVE AREA BY CONTINUITY EQUATION: 3.17 CM2
MV VALVE AREA P 1/2 METHOD: 6.11 CM2
NEUTROPHILS # BLD AUTO: 8.92 X10(3)/MCL (ref 2.1–9.2)
NEUTROPHILS NFR BLD AUTO: 62.2 %
NRBC BLD AUTO-RTO: 0 %
PISA TR MAX VEL: 2.5 M/S
PLATELET # BLD AUTO: 171 X10(3)/MCL (ref 130–400)
PMV BLD AUTO: 11.1 FL (ref 7.4–10.4)
POTASSIUM SERPL-SCNC: 3.6 MMOL/L (ref 3.5–5.1)
PROT SERPL-MCNC: 5.8 GM/DL (ref 5.8–7.6)
PV PEAK GRADIENT: 6 MMHG
PV PEAK VELOCITY: 1.18 M/S
RA PRESSURE ESTIMATED: 3 MMHG
RBC # BLD AUTO: 4.61 X10(6)/MCL (ref 4.7–6.1)
RV TB RVSP: 6 MMHG
SODIUM SERPL-SCNC: 140 MMOL/L (ref 136–145)
TDI LATERAL: 0.05 M/S
TDI SEPTAL: 0.04 M/S
TDI: 0.05 M/S
TR MAX PG: 25 MMHG
TRICUSPID ANNULAR PLANE SYSTOLIC EXCURSION: 1.7 CM
TRIGL SERPL-MCNC: 121 MG/DL (ref 34–140)
TV REST PULMONARY ARTERY PRESSURE: 28 MMHG
VLDLC SERPL CALC-MCNC: 24 MG/DL
WBC # BLD AUTO: 14.31 X10(3)/MCL (ref 4.5–11.5)

## 2024-07-28 PROCEDURE — 25000003 PHARM REV CODE 250: Performed by: INTERNAL MEDICINE

## 2024-07-28 PROCEDURE — 99223 1ST HOSP IP/OBS HIGH 75: CPT | Mod: 25,,, | Performed by: SPECIALIST

## 2024-07-28 PROCEDURE — 95816 EEG AWAKE AND DROWSY: CPT | Mod: 26,,, | Performed by: SPECIALIST

## 2024-07-28 PROCEDURE — 95819 EEG AWAKE AND ASLEEP: CPT

## 2024-07-28 PROCEDURE — 85025 COMPLETE CBC W/AUTO DIFF WBC: CPT | Performed by: INTERNAL MEDICINE

## 2024-07-28 PROCEDURE — 80061 LIPID PANEL: CPT | Performed by: INTERNAL MEDICINE

## 2024-07-28 PROCEDURE — 36415 COLL VENOUS BLD VENIPUNCTURE: CPT | Performed by: INTERNAL MEDICINE

## 2024-07-28 PROCEDURE — 63600175 PHARM REV CODE 636 W HCPCS: Performed by: INTERNAL MEDICINE

## 2024-07-28 PROCEDURE — 21400001 HC TELEMETRY ROOM

## 2024-07-28 PROCEDURE — 80053 COMPREHEN METABOLIC PANEL: CPT | Performed by: INTERNAL MEDICINE

## 2024-07-28 PROCEDURE — 94760 N-INVAS EAR/PLS OXIMETRY 1: CPT

## 2024-07-28 RX ORDER — ASPIRIN 81 MG/1
81 TABLET ORAL DAILY
Status: DISCONTINUED | OUTPATIENT
Start: 2024-07-28 | End: 2024-08-06 | Stop reason: HOSPADM

## 2024-07-28 RX ORDER — METOPROLOL TARTRATE 25 MG/1
25 TABLET, FILM COATED ORAL 2 TIMES DAILY
Status: DISCONTINUED | OUTPATIENT
Start: 2024-07-28 | End: 2024-08-06 | Stop reason: HOSPADM

## 2024-07-28 RX ORDER — TAMSULOSIN HYDROCHLORIDE 0.4 MG/1
0.4 CAPSULE ORAL DAILY
Status: DISCONTINUED | OUTPATIENT
Start: 2024-07-28 | End: 2024-08-06 | Stop reason: HOSPADM

## 2024-07-28 RX ORDER — CLOPIDOGREL BISULFATE 75 MG/1
75 TABLET ORAL EVERY MORNING
Status: DISCONTINUED | OUTPATIENT
Start: 2024-07-28 | End: 2024-08-06 | Stop reason: HOSPADM

## 2024-07-28 RX ORDER — ATORVASTATIN CALCIUM 40 MG/1
40 TABLET, FILM COATED ORAL DAILY
Status: DISCONTINUED | OUTPATIENT
Start: 2024-07-28 | End: 2024-08-01

## 2024-07-28 RX ADMIN — METOPROLOL TARTRATE 25 MG: 25 TABLET, FILM COATED ORAL at 09:07

## 2024-07-28 RX ADMIN — POTASSIUM & SODIUM PHOSPHATES POWDER PACK 280-160-250 MG 1 PACKET: 280-160-250 PACK at 11:07

## 2024-07-28 RX ADMIN — DEXTROSE MONOHYDRATE: 50 INJECTION, SOLUTION INTRAVENOUS at 04:07

## 2024-07-28 RX ADMIN — ASPIRIN 81 MG: 81 TABLET, COATED ORAL at 11:07

## 2024-07-28 RX ADMIN — POTASSIUM & SODIUM PHOSPHATES POWDER PACK 280-160-250 MG 1 PACKET: 280-160-250 PACK at 09:07

## 2024-07-28 RX ADMIN — TAMSULOSIN HYDROCHLORIDE 0.4 MG: 0.4 CAPSULE ORAL at 11:07

## 2024-07-28 RX ADMIN — ENOXAPARIN SODIUM 40 MG: 40 INJECTION SUBCUTANEOUS at 05:07

## 2024-07-28 RX ADMIN — CLOPIDOGREL BISULFATE 75 MG: 75 TABLET ORAL at 11:07

## 2024-07-28 RX ADMIN — CALCIUM CARBONATE (ANTACID) CHEW TAB 500 MG 1000 MG: 500 CHEW TAB at 08:07

## 2024-07-28 RX ADMIN — METOPROLOL TARTRATE 25 MG: 25 TABLET, FILM COATED ORAL at 11:07

## 2024-07-28 RX ADMIN — POTASSIUM & SODIUM PHOSPHATES POWDER PACK 280-160-250 MG 1 PACKET: 280-160-250 PACK at 05:07

## 2024-07-28 RX ADMIN — ATORVASTATIN CALCIUM 40 MG: 40 TABLET, FILM COATED ORAL at 11:07

## 2024-07-28 RX ADMIN — CEFTRIAXONE SODIUM 1 G: 1 INJECTION, POWDER, FOR SOLUTION INTRAMUSCULAR; INTRAVENOUS at 11:07

## 2024-07-28 RX ADMIN — CALCIUM CARBONATE (ANTACID) CHEW TAB 500 MG 1000 MG: 500 CHEW TAB at 09:07

## 2024-07-28 RX ADMIN — POTASSIUM & SODIUM PHOSPHATES POWDER PACK 280-160-250 MG 1 PACKET: 280-160-250 PACK at 08:07

## 2024-07-29 LAB
ANION GAP SERPL CALC-SCNC: 7 MEQ/L
APPEARANCE CSF: CLEAR
BACTERIA BLD CULT: NORMAL
BACTERIA BLD CULT: NORMAL
BASOPHILS # BLD AUTO: 0.11 X10(3)/MCL
BASOPHILS NFR BLD AUTO: 0.8 %
BUN SERPL-MCNC: 8.9 MG/DL (ref 8.4–25.7)
C GATTII+NEOFOR DNA CSF QL NAA+NON-PROBE: NOT DETECTED
CALCIUM SERPL-MCNC: 9.2 MG/DL (ref 8.8–10)
CHLORIDE SERPL-SCNC: 110 MMOL/L (ref 98–107)
CMV DNA CSF QL NAA+NON-PROBE: NOT DETECTED
CO2 SERPL-SCNC: 21 MMOL/L (ref 23–31)
COLOR CSF: COLORLESS
CREAT SERPL-MCNC: 0.8 MG/DL (ref 0.73–1.18)
CREAT/UREA NIT SERPL: 11
E COLI K1 DNA CSF QL NAA+NON-PROBE: NOT DETECTED
EOSINOPHIL # BLD AUTO: 0.36 X10(3)/MCL (ref 0–0.9)
EOSINOPHIL NFR BLD AUTO: 2.7 %
ERYTHROCYTE [DISTWIDTH] IN BLOOD BY AUTOMATED COUNT: 13.6 % (ref 11.5–17)
EV RNA CSF QL NAA+NON-PROBE: NOT DETECTED
GFR SERPLBLD CREATININE-BSD FMLA CKD-EPI: >60 ML/MIN/1.73/M2
GLUCOSE CSF-MCNC: 66 MG/DL (ref 40–70)
GLUCOSE SERPL-MCNC: 110 MG/DL (ref 82–115)
GP B STREP DNA CSF QL NAA+NON-PROBE: NOT DETECTED
HAEM INFLU DNA CSF QL NAA+NON-PROBE: NOT DETECTED
HCT VFR BLD AUTO: 42 % (ref 42–52)
HGB BLD-MCNC: 14.5 G/DL (ref 14–18)
HHV6 DNA CSF QL NAA+NON-PROBE: NOT DETECTED
HSV1 DNA CSF QL NAA+NON-PROBE: NOT DETECTED
HSV2 DNA CSF QL NAA+NON-PROBE: NOT DETECTED
IMM GRANULOCYTES # BLD AUTO: 0.45 X10(3)/MCL (ref 0–0.04)
IMM GRANULOCYTES NFR BLD AUTO: 3.3 %
INR PPP: 0.9
L MONOCYTOG DNA CSF QL NAA+NON-PROBE: NOT DETECTED
LYMPHOCYTES # BLD AUTO: 2.46 X10(3)/MCL (ref 0.6–4.6)
LYMPHOCYTES NFR BLD AUTO: 18.1 %
MCH RBC QN AUTO: 33.1 PG (ref 27–31)
MCHC RBC AUTO-ENTMCNC: 34.5 G/DL (ref 33–36)
MCV RBC AUTO: 95.9 FL (ref 80–94)
MONOCYTE MAN % CSF (OLG): 100 %
MONOCYTES # BLD AUTO: 1.64 X10(3)/MCL (ref 0.1–1.3)
MONOCYTES NFR BLD AUTO: 12.1 %
N MEN DNA CSF QL NAA+NON-PROBE: NOT DETECTED
NEUTROPHILS # BLD AUTO: 8.54 X10(3)/MCL (ref 2.1–9.2)
NEUTROPHILS NFR BLD AUTO: 63 %
NRBC BLD AUTO-RTO: 0 %
PARECHOVIRUS A RNA CSF QL NAA+NON-PROBE: NOT DETECTED
PLATELET # BLD AUTO: 160 X10(3)/MCL (ref 130–400)
PLATELET # BLD AUTO: 194 X10(3)/MCL (ref 130–400)
PMV BLD AUTO: 10.9 FL (ref 7.4–10.4)
POTASSIUM SERPL-SCNC: 3.4 MMOL/L (ref 3.5–5.1)
PROT CSF-MCNC: 104.5 MG/DL (ref 15–45)
PROTHROMBIN TIME: 12.4 SECONDS (ref 12.5–14.5)
RBC # BLD AUTO: 4.38 X10(6)/MCL (ref 4.7–6.1)
RBC # CSF MANUAL: 0 /UL
S PNEUM DNA CSF QL NAA+NON-PROBE: NOT DETECTED
SODIUM SERPL-SCNC: 138 MMOL/L (ref 136–145)
VZV DNA CSF QL NAA+NON-PROBE: NOT DETECTED
WBC # BLD AUTO: 13.56 X10(3)/MCL (ref 4.5–11.5)
WBC # CSF MANUAL: 1 /UL (ref 0–5)

## 2024-07-29 PROCEDURE — 63600175 PHARM REV CODE 636 W HCPCS: Performed by: INTERNAL MEDICINE

## 2024-07-29 PROCEDURE — 27000221 HC OXYGEN, UP TO 24 HOURS

## 2024-07-29 PROCEDURE — 25000003 PHARM REV CODE 250: Performed by: INTERNAL MEDICINE

## 2024-07-29 PROCEDURE — 85025 COMPLETE CBC W/AUTO DIFF WBC: CPT | Performed by: INTERNAL MEDICINE

## 2024-07-29 PROCEDURE — 97162 PT EVAL MOD COMPLEX 30 MIN: CPT

## 2024-07-29 PROCEDURE — 89051 BODY FLUID CELL COUNT: CPT | Performed by: INTERNAL MEDICINE

## 2024-07-29 PROCEDURE — 87070 CULTURE OTHR SPECIMN AEROBIC: CPT | Performed by: INTERNAL MEDICINE

## 2024-07-29 PROCEDURE — 85049 AUTOMATED PLATELET COUNT: CPT | Performed by: INTERNAL MEDICINE

## 2024-07-29 PROCEDURE — 21400001 HC TELEMETRY ROOM

## 2024-07-29 PROCEDURE — 87483 CNS DNA AMP PROBE TYPE 12-25: CPT | Performed by: INTERNAL MEDICINE

## 2024-07-29 PROCEDURE — 36415 COLL VENOUS BLD VENIPUNCTURE: CPT | Performed by: INTERNAL MEDICINE

## 2024-07-29 PROCEDURE — 009U3ZX DRAINAGE OF SPINAL CANAL, PERCUTANEOUS APPROACH, DIAGNOSTIC: ICD-10-PCS | Performed by: RADIOLOGY

## 2024-07-29 PROCEDURE — 85610 PROTHROMBIN TIME: CPT | Performed by: INTERNAL MEDICINE

## 2024-07-29 PROCEDURE — 80048 BASIC METABOLIC PNL TOTAL CA: CPT | Performed by: INTERNAL MEDICINE

## 2024-07-29 PROCEDURE — 86592 SYPHILIS TEST NON-TREP QUAL: CPT | Performed by: INTERNAL MEDICINE

## 2024-07-29 PROCEDURE — 82945 GLUCOSE OTHER FLUID: CPT | Performed by: INTERNAL MEDICINE

## 2024-07-29 PROCEDURE — 97166 OT EVAL MOD COMPLEX 45 MIN: CPT

## 2024-07-29 PROCEDURE — 84157 ASSAY OF PROTEIN OTHER: CPT | Performed by: INTERNAL MEDICINE

## 2024-07-29 RX ADMIN — ATORVASTATIN CALCIUM 40 MG: 40 TABLET, FILM COATED ORAL at 09:07

## 2024-07-29 RX ADMIN — METOPROLOL TARTRATE 25 MG: 25 TABLET, FILM COATED ORAL at 09:07

## 2024-07-29 RX ADMIN — ASPIRIN 81 MG: 81 TABLET, COATED ORAL at 09:07

## 2024-07-29 RX ADMIN — ACETAMINOPHEN 650 MG: 325 TABLET, FILM COATED ORAL at 10:07

## 2024-07-29 RX ADMIN — TAMSULOSIN HYDROCHLORIDE 0.4 MG: 0.4 CAPSULE ORAL at 09:07

## 2024-07-29 RX ADMIN — CALCIUM CARBONATE (ANTACID) CHEW TAB 500 MG 1000 MG: 500 CHEW TAB at 09:07

## 2024-07-29 RX ADMIN — DEXTROSE MONOHYDRATE: 50 INJECTION, SOLUTION INTRAVENOUS at 11:07

## 2024-07-29 RX ADMIN — POTASSIUM BICARBONATE 25 MEQ: 977.5 TABLET, EFFERVESCENT ORAL at 07:07

## 2024-07-29 RX ADMIN — CLOPIDOGREL BISULFATE 75 MG: 75 TABLET ORAL at 09:07

## 2024-07-29 RX ADMIN — POTASSIUM & SODIUM PHOSPHATES POWDER PACK 280-160-250 MG 1 PACKET: 280-160-250 PACK at 09:07

## 2024-07-29 RX ADMIN — ENOXAPARIN SODIUM 40 MG: 40 INJECTION SUBCUTANEOUS at 06:07

## 2024-07-29 RX ADMIN — CEFTRIAXONE SODIUM 1 G: 1 INJECTION, POWDER, FOR SOLUTION INTRAMUSCULAR; INTRAVENOUS at 12:07

## 2024-07-30 LAB — T PALLIDUM AB SER QL: NONREACTIVE

## 2024-07-30 PROCEDURE — 99900035 HC TECH TIME PER 15 MIN (STAT)

## 2024-07-30 PROCEDURE — 36415 COLL VENOUS BLD VENIPUNCTURE: CPT | Performed by: INTERNAL MEDICINE

## 2024-07-30 PROCEDURE — 25000003 PHARM REV CODE 250: Performed by: INTERNAL MEDICINE

## 2024-07-30 PROCEDURE — 86780 TREPONEMA PALLIDUM: CPT | Performed by: INTERNAL MEDICINE

## 2024-07-30 PROCEDURE — 63600175 PHARM REV CODE 636 W HCPCS: Performed by: INTERNAL MEDICINE

## 2024-07-30 PROCEDURE — 99900031 HC PATIENT EDUCATION (STAT)

## 2024-07-30 PROCEDURE — 94760 N-INVAS EAR/PLS OXIMETRY 1: CPT

## 2024-07-30 PROCEDURE — 27000221 HC OXYGEN, UP TO 24 HOURS

## 2024-07-30 PROCEDURE — 21400001 HC TELEMETRY ROOM

## 2024-07-30 PROCEDURE — 97535 SELF CARE MNGMENT TRAINING: CPT

## 2024-07-30 RX ADMIN — CALCIUM CARBONATE (ANTACID) CHEW TAB 500 MG 1000 MG: 500 CHEW TAB at 08:07

## 2024-07-30 RX ADMIN — ATORVASTATIN CALCIUM 40 MG: 40 TABLET, FILM COATED ORAL at 09:07

## 2024-07-30 RX ADMIN — METOPROLOL TARTRATE 25 MG: 25 TABLET, FILM COATED ORAL at 08:07

## 2024-07-30 RX ADMIN — ENOXAPARIN SODIUM 40 MG: 40 INJECTION SUBCUTANEOUS at 05:07

## 2024-07-30 RX ADMIN — CALCIUM CARBONATE (ANTACID) CHEW TAB 500 MG 1000 MG: 500 CHEW TAB at 09:07

## 2024-07-30 RX ADMIN — CLOPIDOGREL BISULFATE 75 MG: 75 TABLET ORAL at 09:07

## 2024-07-30 RX ADMIN — ACETAMINOPHEN 650 MG: 325 TABLET, FILM COATED ORAL at 09:07

## 2024-07-30 RX ADMIN — TAMSULOSIN HYDROCHLORIDE 0.4 MG: 0.4 CAPSULE ORAL at 09:07

## 2024-07-30 RX ADMIN — ASPIRIN 81 MG: 81 TABLET, COATED ORAL at 09:07

## 2024-07-30 RX ADMIN — CEFTRIAXONE SODIUM 1 G: 1 INJECTION, POWDER, FOR SOLUTION INTRAMUSCULAR; INTRAVENOUS at 12:07

## 2024-07-30 RX ADMIN — METOPROLOL TARTRATE 25 MG: 25 TABLET, FILM COATED ORAL at 09:07

## 2024-07-31 LAB
ALBUMIN SERPL-MCNC: 2.5 G/DL (ref 3.4–4.8)
ALBUMIN/GLOB SERPL: 0.9 RATIO (ref 1.1–2)
ALP SERPL-CCNC: 62 UNIT/L (ref 40–150)
ALT SERPL-CCNC: 41 UNIT/L (ref 0–55)
ANION GAP SERPL CALC-SCNC: 5 MEQ/L
AST SERPL-CCNC: 60 UNIT/L (ref 5–34)
BASOPHILS # BLD AUTO: 0.08 X10(3)/MCL
BASOPHILS NFR BLD AUTO: 0.8 %
BILIRUB SERPL-MCNC: 0.9 MG/DL
BUN SERPL-MCNC: 13.5 MG/DL (ref 8.4–25.7)
CALCIUM SERPL-MCNC: 9.2 MG/DL (ref 8.8–10)
CHLORIDE SERPL-SCNC: 109 MMOL/L (ref 98–107)
CO2 SERPL-SCNC: 21 MMOL/L (ref 23–31)
CREAT SERPL-MCNC: 0.83 MG/DL (ref 0.73–1.18)
CREAT/UREA NIT SERPL: 16
EOSINOPHIL # BLD AUTO: 0.37 X10(3)/MCL (ref 0–0.9)
EOSINOPHIL NFR BLD AUTO: 3.6 %
ERYTHROCYTE [DISTWIDTH] IN BLOOD BY AUTOMATED COUNT: 13.2 % (ref 11.5–17)
GFR SERPLBLD CREATININE-BSD FMLA CKD-EPI: >60 ML/MIN/1.73/M2
GLOBULIN SER-MCNC: 2.7 GM/DL (ref 2.4–3.5)
GLUCOSE SERPL-MCNC: 115 MG/DL (ref 82–115)
HCT VFR BLD AUTO: 40.3 % (ref 42–52)
HGB BLD-MCNC: 14.1 G/DL (ref 14–18)
IMM GRANULOCYTES # BLD AUTO: 0.25 X10(3)/MCL (ref 0–0.04)
IMM GRANULOCYTES NFR BLD AUTO: 2.4 %
LYMPHOCYTES # BLD AUTO: 2.13 X10(3)/MCL (ref 0.6–4.6)
LYMPHOCYTES NFR BLD AUTO: 20.6 %
MCH RBC QN AUTO: 32.6 PG (ref 27–31)
MCHC RBC AUTO-ENTMCNC: 35 G/DL (ref 33–36)
MCV RBC AUTO: 93.1 FL (ref 80–94)
MONOCYTES # BLD AUTO: 1.18 X10(3)/MCL (ref 0.1–1.3)
MONOCYTES NFR BLD AUTO: 11.4 %
NEUTROPHILS # BLD AUTO: 6.33 X10(3)/MCL (ref 2.1–9.2)
NEUTROPHILS NFR BLD AUTO: 61.2 %
NRBC BLD AUTO-RTO: 0 %
PLATELET # BLD AUTO: 184 X10(3)/MCL (ref 130–400)
PMV BLD AUTO: 11.1 FL (ref 7.4–10.4)
POTASSIUM SERPL-SCNC: 3.6 MMOL/L (ref 3.5–5.1)
PROT SERPL-MCNC: 5.2 GM/DL (ref 5.8–7.6)
RBC # BLD AUTO: 4.33 X10(6)/MCL (ref 4.7–6.1)
SODIUM SERPL-SCNC: 135 MMOL/L (ref 136–145)
WBC # BLD AUTO: 10.34 X10(3)/MCL (ref 4.5–11.5)

## 2024-07-31 PROCEDURE — 94760 N-INVAS EAR/PLS OXIMETRY 1: CPT

## 2024-07-31 PROCEDURE — 25000003 PHARM REV CODE 250: Performed by: INTERNAL MEDICINE

## 2024-07-31 PROCEDURE — 63600175 PHARM REV CODE 636 W HCPCS: Performed by: INTERNAL MEDICINE

## 2024-07-31 PROCEDURE — 97535 SELF CARE MNGMENT TRAINING: CPT

## 2024-07-31 PROCEDURE — 85025 COMPLETE CBC W/AUTO DIFF WBC: CPT | Performed by: INTERNAL MEDICINE

## 2024-07-31 PROCEDURE — 36415 COLL VENOUS BLD VENIPUNCTURE: CPT | Performed by: INTERNAL MEDICINE

## 2024-07-31 PROCEDURE — 21400001 HC TELEMETRY ROOM

## 2024-07-31 PROCEDURE — 97530 THERAPEUTIC ACTIVITIES: CPT

## 2024-07-31 PROCEDURE — 80053 COMPREHEN METABOLIC PANEL: CPT | Performed by: INTERNAL MEDICINE

## 2024-07-31 RX ADMIN — ENOXAPARIN SODIUM 40 MG: 40 INJECTION SUBCUTANEOUS at 06:07

## 2024-07-31 RX ADMIN — CALCIUM CARBONATE (ANTACID) CHEW TAB 500 MG 1000 MG: 500 CHEW TAB at 09:07

## 2024-07-31 RX ADMIN — METOPROLOL TARTRATE 25 MG: 25 TABLET, FILM COATED ORAL at 09:07

## 2024-07-31 RX ADMIN — ATORVASTATIN CALCIUM 40 MG: 40 TABLET, FILM COATED ORAL at 09:07

## 2024-07-31 RX ADMIN — Medication 6 MG: at 09:07

## 2024-07-31 RX ADMIN — DEXTROSE MONOHYDRATE: 50 INJECTION, SOLUTION INTRAVENOUS at 09:07

## 2024-07-31 RX ADMIN — TAMSULOSIN HYDROCHLORIDE 0.4 MG: 0.4 CAPSULE ORAL at 09:07

## 2024-07-31 RX ADMIN — ASPIRIN 81 MG: 81 TABLET, COATED ORAL at 09:07

## 2024-07-31 RX ADMIN — CLOPIDOGREL BISULFATE 75 MG: 75 TABLET ORAL at 09:07

## 2024-07-31 RX ADMIN — CEFTRIAXONE SODIUM 1 G: 1 INJECTION, POWDER, FOR SOLUTION INTRAMUSCULAR; INTRAVENOUS at 11:07

## 2024-08-01 PROCEDURE — 63600175 PHARM REV CODE 636 W HCPCS: Performed by: INTERNAL MEDICINE

## 2024-08-01 PROCEDURE — 25000003 PHARM REV CODE 250: Performed by: INTERNAL MEDICINE

## 2024-08-01 PROCEDURE — 97530 THERAPEUTIC ACTIVITIES: CPT | Mod: CQ

## 2024-08-01 PROCEDURE — 94760 N-INVAS EAR/PLS OXIMETRY 1: CPT

## 2024-08-01 PROCEDURE — 21400001 HC TELEMETRY ROOM

## 2024-08-01 RX ORDER — ATORVASTATIN CALCIUM 40 MG/1
40 TABLET, FILM COATED ORAL NIGHTLY
Status: DISCONTINUED | OUTPATIENT
Start: 2024-08-02 | End: 2024-08-06 | Stop reason: HOSPADM

## 2024-08-01 RX ADMIN — TAMSULOSIN HYDROCHLORIDE 0.4 MG: 0.4 CAPSULE ORAL at 08:08

## 2024-08-01 RX ADMIN — CALCIUM CARBONATE (ANTACID) CHEW TAB 500 MG 1000 MG: 500 CHEW TAB at 08:08

## 2024-08-01 RX ADMIN — DEXTROSE MONOHYDRATE: 50 INJECTION, SOLUTION INTRAVENOUS at 12:08

## 2024-08-01 RX ADMIN — CEFTRIAXONE SODIUM 1 G: 1 INJECTION, POWDER, FOR SOLUTION INTRAMUSCULAR; INTRAVENOUS at 10:08

## 2024-08-01 RX ADMIN — CLOPIDOGREL BISULFATE 75 MG: 75 TABLET ORAL at 08:08

## 2024-08-01 RX ADMIN — METOPROLOL TARTRATE 25 MG: 25 TABLET, FILM COATED ORAL at 08:08

## 2024-08-01 RX ADMIN — ASPIRIN 81 MG: 81 TABLET, COATED ORAL at 08:08

## 2024-08-01 RX ADMIN — ENOXAPARIN SODIUM 40 MG: 40 INJECTION SUBCUTANEOUS at 04:08

## 2024-08-01 RX ADMIN — ATORVASTATIN CALCIUM 40 MG: 40 TABLET, FILM COATED ORAL at 08:08

## 2024-08-02 LAB
LEFT CCA DIST DIAS: 2 CM/S
LEFT CCA DIST SYS: 43 CM/S
LEFT CCA PROX DIAS: 2 CM/S
LEFT CCA PROX SYS: 60 CM/S
LEFT ECA DIAS: 0 CM/S
LEFT ECA SYS: 58 CM/S
LEFT ICA DIST DIAS: 5 CM/S
LEFT ICA DIST SYS: 47 CM/S
LEFT ICA MID DIAS: 5 CM/S
LEFT ICA MID SYS: 34 CM/S
LEFT ICA PROX DIAS: 0 CM/S
LEFT ICA PROX SYS: 29 CM/S
LEFT VERTEBRAL DIAS: 0 CM/S
LEFT VERTEBRAL SYS: 27 CM/S
OHS CV CAROTID RIGHT ICA EDV HIGHEST: 14
OHS CV CAROTID ULTRASOUND LEFT ICA/CCA RATIO: 1.09
OHS CV CAROTID ULTRASOUND RIGHT ICA/CCA RATIO: 1.15
OHS CV PV CAROTID LEFT HIGHEST CCA: 60
OHS CV PV CAROTID LEFT HIGHEST ICA: 47
OHS CV PV CAROTID RIGHT HIGHEST CCA: 59
OHS CV PV CAROTID RIGHT HIGHEST ICA: 60
OHS CV US CAROTID LEFT HIGHEST EDV: 5
RIGHT CCA DIST DIAS: 5 CM/S
RIGHT CCA DIST SYS: 52 CM/S
RIGHT CCA PROX DIAS: 4 CM/S
RIGHT CCA PROX SYS: 59 CM/S
RIGHT ECA DIAS: 0 CM/S
RIGHT ECA SYS: 77 CM/S
RIGHT ICA DIST DIAS: 0 CM/S
RIGHT ICA DIST SYS: 56 CM/S
RIGHT ICA MID DIAS: 14 CM/S
RIGHT ICA MID SYS: 60 CM/S
RIGHT ICA PROX DIAS: 3 CM/S
RIGHT ICA PROX SYS: 30 CM/S
RIGHT VERTEBRAL DIAS: 0 CM/S
RIGHT VERTEBRAL SYS: 35 CM/S

## 2024-08-02 PROCEDURE — 25000003 PHARM REV CODE 250: Performed by: INTERNAL MEDICINE

## 2024-08-02 PROCEDURE — 63600175 PHARM REV CODE 636 W HCPCS: Performed by: INTERNAL MEDICINE

## 2024-08-02 PROCEDURE — 97530 THERAPEUTIC ACTIVITIES: CPT

## 2024-08-02 PROCEDURE — 21400001 HC TELEMETRY ROOM

## 2024-08-02 RX ADMIN — CLOPIDOGREL BISULFATE 75 MG: 75 TABLET ORAL at 04:08

## 2024-08-02 RX ADMIN — ERGOCALCIFEROL 50000 UNITS: 1.25 CAPSULE ORAL at 12:08

## 2024-08-02 RX ADMIN — CALCIUM CARBONATE (ANTACID) CHEW TAB 500 MG 1000 MG: 500 CHEW TAB at 09:08

## 2024-08-02 RX ADMIN — TAMSULOSIN HYDROCHLORIDE 0.4 MG: 0.4 CAPSULE ORAL at 09:08

## 2024-08-02 RX ADMIN — ENOXAPARIN SODIUM 40 MG: 40 INJECTION SUBCUTANEOUS at 04:08

## 2024-08-02 RX ADMIN — METOPROLOL TARTRATE 25 MG: 25 TABLET, FILM COATED ORAL at 09:08

## 2024-08-02 RX ADMIN — ACETAMINOPHEN 650 MG: 325 TABLET, FILM COATED ORAL at 11:08

## 2024-08-02 RX ADMIN — CEFTRIAXONE SODIUM 1 G: 1 INJECTION, POWDER, FOR SOLUTION INTRAMUSCULAR; INTRAVENOUS at 12:08

## 2024-08-02 RX ADMIN — ASPIRIN 81 MG: 81 TABLET, COATED ORAL at 09:08

## 2024-08-02 RX ADMIN — ATORVASTATIN CALCIUM 40 MG: 40 TABLET, FILM COATED ORAL at 09:08

## 2024-08-03 LAB
BACTERIA CSF CULT: NORMAL
GRAM STN SPEC: NORMAL

## 2024-08-03 PROCEDURE — 25000003 PHARM REV CODE 250: Performed by: INTERNAL MEDICINE

## 2024-08-03 PROCEDURE — 63600175 PHARM REV CODE 636 W HCPCS: Performed by: INTERNAL MEDICINE

## 2024-08-03 PROCEDURE — 21400001 HC TELEMETRY ROOM

## 2024-08-03 RX ADMIN — CEFTRIAXONE SODIUM 1 G: 1 INJECTION, POWDER, FOR SOLUTION INTRAMUSCULAR; INTRAVENOUS at 11:08

## 2024-08-03 RX ADMIN — CALCIUM CARBONATE (ANTACID) CHEW TAB 500 MG 1000 MG: 500 CHEW TAB at 08:08

## 2024-08-03 RX ADMIN — METOPROLOL TARTRATE 25 MG: 25 TABLET, FILM COATED ORAL at 08:08

## 2024-08-03 RX ADMIN — TAMSULOSIN HYDROCHLORIDE 0.4 MG: 0.4 CAPSULE ORAL at 08:08

## 2024-08-03 RX ADMIN — ENOXAPARIN SODIUM 40 MG: 40 INJECTION SUBCUTANEOUS at 04:08

## 2024-08-03 RX ADMIN — CLOPIDOGREL BISULFATE 75 MG: 75 TABLET ORAL at 04:08

## 2024-08-03 RX ADMIN — ATORVASTATIN CALCIUM 40 MG: 40 TABLET, FILM COATED ORAL at 08:08

## 2024-08-03 RX ADMIN — ASPIRIN 81 MG: 81 TABLET, COATED ORAL at 08:08

## 2024-08-04 PROCEDURE — 25000003 PHARM REV CODE 250: Performed by: INTERNAL MEDICINE

## 2024-08-04 PROCEDURE — 63600175 PHARM REV CODE 636 W HCPCS: Performed by: INTERNAL MEDICINE

## 2024-08-04 PROCEDURE — 94760 N-INVAS EAR/PLS OXIMETRY 1: CPT

## 2024-08-04 PROCEDURE — 21400001 HC TELEMETRY ROOM

## 2024-08-04 RX ADMIN — TAMSULOSIN HYDROCHLORIDE 0.4 MG: 0.4 CAPSULE ORAL at 08:08

## 2024-08-04 RX ADMIN — METOPROLOL TARTRATE 25 MG: 25 TABLET, FILM COATED ORAL at 08:08

## 2024-08-04 RX ADMIN — METOPROLOL TARTRATE 25 MG: 25 TABLET, FILM COATED ORAL at 09:08

## 2024-08-04 RX ADMIN — ENOXAPARIN SODIUM 40 MG: 40 INJECTION SUBCUTANEOUS at 04:08

## 2024-08-04 RX ADMIN — CEFTRIAXONE SODIUM 1 G: 1 INJECTION, POWDER, FOR SOLUTION INTRAMUSCULAR; INTRAVENOUS at 11:08

## 2024-08-04 RX ADMIN — CALCIUM CARBONATE (ANTACID) CHEW TAB 500 MG 1000 MG: 500 CHEW TAB at 09:08

## 2024-08-04 RX ADMIN — CALCIUM CARBONATE (ANTACID) CHEW TAB 500 MG 1000 MG: 500 CHEW TAB at 08:08

## 2024-08-04 RX ADMIN — ATORVASTATIN CALCIUM 40 MG: 40 TABLET, FILM COATED ORAL at 09:08

## 2024-08-04 RX ADMIN — ASPIRIN 81 MG: 81 TABLET, COATED ORAL at 08:08

## 2024-08-04 RX ADMIN — CLOPIDOGREL BISULFATE 75 MG: 75 TABLET ORAL at 04:08

## 2024-08-05 PROCEDURE — 21400001 HC TELEMETRY ROOM

## 2024-08-05 PROCEDURE — 25000003 PHARM REV CODE 250: Performed by: INTERNAL MEDICINE

## 2024-08-05 PROCEDURE — 97530 THERAPEUTIC ACTIVITIES: CPT | Mod: CO

## 2024-08-05 PROCEDURE — 97530 THERAPEUTIC ACTIVITIES: CPT | Mod: CQ

## 2024-08-05 PROCEDURE — 63600175 PHARM REV CODE 636 W HCPCS: Performed by: INTERNAL MEDICINE

## 2024-08-05 RX ADMIN — TAMSULOSIN HYDROCHLORIDE 0.4 MG: 0.4 CAPSULE ORAL at 10:08

## 2024-08-05 RX ADMIN — METOPROLOL TARTRATE 25 MG: 25 TABLET, FILM COATED ORAL at 10:08

## 2024-08-05 RX ADMIN — ATORVASTATIN CALCIUM 40 MG: 40 TABLET, FILM COATED ORAL at 08:08

## 2024-08-05 RX ADMIN — METOPROLOL TARTRATE 25 MG: 25 TABLET, FILM COATED ORAL at 08:08

## 2024-08-05 RX ADMIN — CALCIUM CARBONATE (ANTACID) CHEW TAB 500 MG 1000 MG: 500 CHEW TAB at 08:08

## 2024-08-05 RX ADMIN — CLOPIDOGREL BISULFATE 75 MG: 75 TABLET ORAL at 10:08

## 2024-08-05 RX ADMIN — ERGOCALCIFEROL 50000 UNITS: 1.25 CAPSULE ORAL at 12:08

## 2024-08-05 RX ADMIN — CALCIUM CARBONATE (ANTACID) CHEW TAB 500 MG 1000 MG: 500 CHEW TAB at 10:08

## 2024-08-05 RX ADMIN — LIDOCAINE HYDROCHLORIDE 1 G: 10 INJECTION, SOLUTION INFILTRATION; PERINEURAL at 10:08

## 2024-08-05 RX ADMIN — ASPIRIN 81 MG: 81 TABLET, COATED ORAL at 10:08

## 2024-08-05 RX ADMIN — ENOXAPARIN SODIUM 40 MG: 40 INJECTION SUBCUTANEOUS at 05:08

## 2024-08-06 VITALS
SYSTOLIC BLOOD PRESSURE: 122 MMHG | TEMPERATURE: 98 F | OXYGEN SATURATION: 97 % | HEIGHT: 70 IN | HEART RATE: 83 BPM | DIASTOLIC BLOOD PRESSURE: 77 MMHG | WEIGHT: 169.06 LBS | BODY MASS INDEX: 24.2 KG/M2 | RESPIRATION RATE: 20 BRPM

## 2024-08-06 LAB
ANION GAP SERPL CALC-SCNC: 8 MEQ/L
BASOPHILS # BLD AUTO: 0.07 X10(3)/MCL
BASOPHILS NFR BLD AUTO: 0.8 %
BUN SERPL-MCNC: 19.9 MG/DL (ref 8.4–25.7)
CALCIUM SERPL-MCNC: 10.1 MG/DL (ref 8.8–10)
CHLORIDE SERPL-SCNC: 111 MMOL/L (ref 98–107)
CO2 SERPL-SCNC: 22 MMOL/L (ref 23–31)
CREAT SERPL-MCNC: 0.91 MG/DL (ref 0.73–1.18)
CREAT/UREA NIT SERPL: 22
EOSINOPHIL # BLD AUTO: 0.22 X10(3)/MCL (ref 0–0.9)
EOSINOPHIL NFR BLD AUTO: 2.6 %
ERYTHROCYTE [DISTWIDTH] IN BLOOD BY AUTOMATED COUNT: 13.6 % (ref 11.5–17)
GFR SERPLBLD CREATININE-BSD FMLA CKD-EPI: >60 ML/MIN/1.73/M2
GLUCOSE SERPL-MCNC: 107 MG/DL (ref 82–115)
HCT VFR BLD AUTO: 39.9 % (ref 42–52)
HGB BLD-MCNC: 13.9 G/DL (ref 14–18)
IMM GRANULOCYTES # BLD AUTO: 0.07 X10(3)/MCL (ref 0–0.04)
IMM GRANULOCYTES NFR BLD AUTO: 0.8 %
LYMPHOCYTES # BLD AUTO: 1.72 X10(3)/MCL (ref 0.6–4.6)
LYMPHOCYTES NFR BLD AUTO: 20 %
MAGNESIUM SERPL-MCNC: 2.5 MG/DL (ref 1.6–2.6)
MCH RBC QN AUTO: 32.8 PG (ref 27–31)
MCHC RBC AUTO-ENTMCNC: 34.8 G/DL (ref 33–36)
MCV RBC AUTO: 94.1 FL (ref 80–94)
MONOCYTES # BLD AUTO: 0.72 X10(3)/MCL (ref 0.1–1.3)
MONOCYTES NFR BLD AUTO: 8.4 %
NEUTROPHILS # BLD AUTO: 5.8 X10(3)/MCL (ref 2.1–9.2)
NEUTROPHILS NFR BLD AUTO: 67.4 %
NRBC BLD AUTO-RTO: 0 %
PHOSPHATE SERPL-MCNC: 2.6 MG/DL (ref 2.3–4.7)
PLATELET # BLD AUTO: 326 X10(3)/MCL (ref 130–400)
PLATELETS.RETICULATED NFR BLD AUTO: 2.7 % (ref 0.9–11.2)
PMV BLD AUTO: 9.7 FL (ref 7.4–10.4)
POTASSIUM SERPL-SCNC: 3.3 MMOL/L (ref 3.5–5.1)
RBC # BLD AUTO: 4.24 X10(6)/MCL (ref 4.7–6.1)
SODIUM SERPL-SCNC: 141 MMOL/L (ref 136–145)
WBC # BLD AUTO: 8.6 X10(3)/MCL (ref 4.5–11.5)

## 2024-08-06 PROCEDURE — 25000003 PHARM REV CODE 250: Performed by: INTERNAL MEDICINE

## 2024-08-06 PROCEDURE — 83735 ASSAY OF MAGNESIUM: CPT | Performed by: STUDENT IN AN ORGANIZED HEALTH CARE EDUCATION/TRAINING PROGRAM

## 2024-08-06 PROCEDURE — 36415 COLL VENOUS BLD VENIPUNCTURE: CPT | Performed by: STUDENT IN AN ORGANIZED HEALTH CARE EDUCATION/TRAINING PROGRAM

## 2024-08-06 PROCEDURE — 63600175 PHARM REV CODE 636 W HCPCS: Performed by: INTERNAL MEDICINE

## 2024-08-06 PROCEDURE — 97530 THERAPEUTIC ACTIVITIES: CPT | Mod: CQ

## 2024-08-06 PROCEDURE — 84100 ASSAY OF PHOSPHORUS: CPT | Performed by: STUDENT IN AN ORGANIZED HEALTH CARE EDUCATION/TRAINING PROGRAM

## 2024-08-06 PROCEDURE — 85025 COMPLETE CBC W/AUTO DIFF WBC: CPT | Performed by: STUDENT IN AN ORGANIZED HEALTH CARE EDUCATION/TRAINING PROGRAM

## 2024-08-06 PROCEDURE — 80048 BASIC METABOLIC PNL TOTAL CA: CPT | Performed by: STUDENT IN AN ORGANIZED HEALTH CARE EDUCATION/TRAINING PROGRAM

## 2024-08-06 RX ADMIN — CALCIUM CARBONATE (ANTACID) CHEW TAB 500 MG 1000 MG: 500 CHEW TAB at 09:08

## 2024-08-06 RX ADMIN — CLOPIDOGREL BISULFATE 75 MG: 75 TABLET ORAL at 05:08

## 2024-08-06 RX ADMIN — ERGOCALCIFEROL 50000 UNITS: 1.25 CAPSULE ORAL at 02:08

## 2024-08-06 RX ADMIN — ASPIRIN 81 MG: 81 TABLET, COATED ORAL at 09:08

## 2024-08-06 RX ADMIN — LIDOCAINE HYDROCHLORIDE 1 G: 10 INJECTION, SOLUTION INFILTRATION; PERINEURAL at 09:08

## 2024-08-06 RX ADMIN — METOPROLOL TARTRATE 25 MG: 25 TABLET, FILM COATED ORAL at 09:08

## 2024-08-06 RX ADMIN — TAMSULOSIN HYDROCHLORIDE 0.4 MG: 0.4 CAPSULE ORAL at 09:08

## 2024-08-07 ENCOUNTER — HOSPITAL ENCOUNTER (EMERGENCY)
Facility: HOSPITAL | Age: 89
Discharge: NURSING, REHAB OR LONG-TERM CARE | End: 2024-08-07
Attending: EMERGENCY MEDICINE
Payer: MEDICARE

## 2024-08-07 VITALS
WEIGHT: 180 LBS | BODY MASS INDEX: 24.38 KG/M2 | DIASTOLIC BLOOD PRESSURE: 77 MMHG | HEART RATE: 94 BPM | SYSTOLIC BLOOD PRESSURE: 132 MMHG | HEIGHT: 72 IN | OXYGEN SATURATION: 95 % | TEMPERATURE: 97 F | RESPIRATION RATE: 20 BRPM

## 2024-08-07 DIAGNOSIS — S51.811A LACERATION OF RIGHT FOREARM, INITIAL ENCOUNTER: Primary | ICD-10-CM

## 2024-08-07 DIAGNOSIS — S41.119A LACERATION OF ARM: ICD-10-CM

## 2024-08-07 PROCEDURE — 25000003 PHARM REV CODE 250

## 2024-08-07 PROCEDURE — 99283 EMERGENCY DEPT VISIT LOW MDM: CPT | Mod: 25

## 2024-08-07 PROCEDURE — 12004 RPR S/N/AX/GEN/TRK7.6-12.5CM: CPT

## 2024-08-07 RX ORDER — CEPHALEXIN 500 MG/1
500 CAPSULE ORAL 4 TIMES DAILY
Qty: 20 CAPSULE | Refills: 0 | Status: SHIPPED | OUTPATIENT
Start: 2024-08-07 | End: 2024-08-12

## 2024-08-07 RX ORDER — LIDOCAINE HYDROCHLORIDE 10 MG/ML
INJECTION, SOLUTION INFILTRATION; PERINEURAL
Status: COMPLETED
Start: 2024-08-07 | End: 2024-08-07

## 2024-08-07 RX ORDER — LIDOCAINE HYDROCHLORIDE 10 MG/ML
5 INJECTION, SOLUTION EPIDURAL; INFILTRATION; INTRACAUDAL; PERINEURAL
Status: COMPLETED | OUTPATIENT
Start: 2024-08-07 | End: 2024-08-07

## 2024-08-07 RX ADMIN — LIDOCAINE HYDROCHLORIDE 200 MG: 10 INJECTION, SOLUTION INFILTRATION; PERINEURAL at 04:08

## 2024-08-07 NOTE — ED PROVIDER NOTES
Encounter Date: 8/7/2024       History     Chief Complaint   Patient presents with    Laceration     Via [facility] for a laceration to R forearm after NH staff attempted to transfer pt from bed to wheelchair and pt slipped, hitting forearm on foot of wheelchair. NH denies pt hitting head, denies LOC. + plavix, baby aspirin. Arm wrapped, bleeding controlled. GCS 14 at baseline     Patient was sent to the ED from the nursing home due to a laceration to the right forearm. Nursing home reports that they were transferring patient from bed to wheelchair when his right forearm was cut by a part of the wheelchair. Deny that patient feel or hit his head. Tetanus is UTD per patient's records. Patient is at his report baseline. Hx. Of Dementia, HTN.    The history is provided by the EMS personnel and the nursing home. History limited by: Dementia.     Review of patient's allergies indicates:  No Known Allergies  No past medical history on file.  No past surgical history on file.  No family history on file.     Review of Systems   Unable to perform ROS: Dementia       Physical Exam     Initial Vitals   BP Pulse Resp Temp SpO2   08/07/24 1502 08/07/24 1502 08/07/24 1502 08/07/24 1504 08/07/24 1502   115/75 89 18 97 °F (36.1 °C) 95 %      MAP       --                Physical Exam    Nursing note and vitals reviewed.  Constitutional: He appears well-developed and well-nourished.   HENT:   Head: Normocephalic and atraumatic.   Eyes: Conjunctivae and EOM are normal. Pupils are equal, round, and reactive to light.   Neck: Neck supple.   Normal range of motion.  Cardiovascular:  Normal rate, regular rhythm, normal heart sounds and intact distal pulses.           Pulses:       Radial pulses are 2+ on the right side and 2+ on the left side.   Pulmonary/Chest: Breath sounds normal. No respiratory distress. He has no wheezes.   Abdominal: Abdomen is soft. Bowel sounds are normal. He exhibits no distension. There is no abdominal tenderness.    Musculoskeletal:         General: Normal range of motion.      Right forearm: Laceration (There is an 8 cm wound to the inner right forearm (please see picture).) and tenderness present. No swelling.      Left forearm: Normal.      Cervical back: Normal range of motion and neck supple.     Neurological: He is alert. He has normal strength.   Patient is at his reported baseline.    Skin: Skin is warm and dry. Capillary refill takes less than 2 seconds.         ED Course   Lac Repair    Date/Time: 8/7/2024 4:39 PM    Performed by: Nery Nelson FNP  Authorized by: Nery Nelson FNP    Consent:     Consent obtained:  Emergent situation  Universal protocol:     Patient identity confirmed:  Arm band and provided demographic data  Anesthesia:     Anesthesia method:  Local infiltration    Local anesthetic:  Lidocaine 1% w/o epi  Laceration details:     Location:  Shoulder/arm    Shoulder/arm location:  R lower arm    Length (cm):  8  Pre-procedure details:     Preparation:  Patient was prepped and draped in usual sterile fashion and imaging obtained to evaluate for foreign bodies  Exploration:     Hemostasis achieved with:  Direct pressure    Imaging obtained: x-ray      Wound exploration: wound explored through full range of motion and entire depth of wound visualized      Wound extent: no muscle damage noted and no underlying fracture noted    Treatment:     Area cleansed with:  Povidone-iodine and saline    Amount of cleaning:  Extensive    Irrigation method:  Syringe    Foreign body removal: No FB noted..    Skin repair:     Repair method:  Sutures    Suture size:  4-0    Suture material:  Prolene    Suture technique:  Simple interrupted    Number of sutures:  12  Approximation:     Approximation:  Close  Repair type:     Repair type:  Simple  Post-procedure details:     Dressing:  Non-adherent dressing    Procedure completion:  Tolerated well, no immediate complications  Comments:      Wounds edges are well  approximated and bleeding is controlled.     Labs Reviewed - No data to display       Imaging Results              X-Ray Forearm Right (Final result)  Result time 08/07/24 16:14:20      Final result by Darian Cabrales MD (08/07/24 16:14:20)                   Narrative:    EXAMINATION  XR FOREARM RIGHT    CLINICAL HISTORY  Laceration without foreign body of unspecified upper arm, initial encounter    TECHNIQUE  A total of 2 images submitted of the right forearm.    COMPARISON  None available at the time of initial interpretation.    FINDINGS  Regional degenerative changes are present.  No convincing acutely displaced fracture or dislocation is identified.  No aggressive osseous lesion or periosteal reaction is appreciated.    The included soft tissues are without acute abnormality.    IMPRESSION  1. Degenerative changes without convincing acute osseous abnormality.  2. No visualized radiopaque foreign body.      Electronically signed by: Darian Cabrales  Date:    08/07/2024  Time:    16:14                                     Medications   LIDOcaine (PF) 10 mg/ml (1%) injection 50 mg (0 mg Infiltration Override Pull 8/7/24 1645)   LIDOcaine HCL 10 mg/ml (1%) 10 mg/mL (1 %) injection (200 mg  Given 8/7/24 1635)     Medical Decision Making  Amount and/or Complexity of Data Reviewed  Independent Historian: EMS     Details: Nursing Home  Radiology: ordered. Decision-making details documented in ED Course.  Discussion of management or test interpretation with external provider(s): Differential diagnosis (including but not limited to):   Judging by the patient's chief complaint and pertinent history, the patient has the following possible differential diagnoses, including but not limited to the following.  Some of these are deemed to be lower likelihood and some more likely based on my physical exam and history combined with possible lab work and/or imaging studies.   Please see the pertinent studies, and refer to the HPI.   Some of these diagnoses will take further evaluation to fully rule out, perhaps as an outpatient and the patient was encouraged to follow up when discharged for more comprehensive evaluation.  Laceration, Skin Tear, Fracture   X-ray of right forearm does not show any acute abnormality. Laceration was repaired (please see procedure note). Will discharge patient back to the nursing home.     Risk  Prescription drug management.               ED Course as of 08/07/24 1854   Wed Aug 07, 2024   1627 X-Ray Forearm Right [AB]      ED Course User Index  [AB] Nery Nelson FNP                           Clinical Impression:  Final diagnoses:  [S41.119A] Laceration of arm  [S51.811A] Laceration of right forearm, initial encounter (Primary)          ED Disposition Condition    Discharge Stable          ED Prescriptions       Medication Sig Dispense Start Date End Date Auth. Provider    cephALEXin (KEFLEX) 500 MG capsule Take 1 capsule (500 mg total) by mouth 4 (four) times daily. for 5 days 20 capsule 8/7/2024 8/12/2024 Nery Nelson FNP          Follow-up Information       Follow up With Specialties Details Why Contact Info    Primary Care Provider  In 3 days      [facility] - Emergency Dept Emergency Medicine  Please return for suture removal in 7-10 days. [facility] [facility] [id number] [phone]             Nery Nelson FNP  08/07/24 1855

## 2024-08-08 LAB — VDRL CSF QL: NEGATIVE

## 2024-08-14 ENCOUNTER — HOSPITAL ENCOUNTER (INPATIENT)
Facility: HOSPITAL | Age: 89
LOS: 6 days | Discharge: NURSING, REHAB OR LONG-TERM CARE | DRG: 640 | End: 2024-08-20
Attending: EMERGENCY MEDICINE | Admitting: INTERNAL MEDICINE
Payer: MEDICARE

## 2024-08-14 DIAGNOSIS — E86.0 DEHYDRATION: ICD-10-CM

## 2024-08-14 DIAGNOSIS — N17.9 ACUTE KIDNEY INJURY: ICD-10-CM

## 2024-08-14 DIAGNOSIS — E87.0 HYPERNATREMIA: Primary | ICD-10-CM

## 2024-08-14 DIAGNOSIS — G93.41 METABOLIC ENCEPHALOPATHY: ICD-10-CM

## 2024-08-14 LAB
ALBUMIN SERPL-MCNC: 3.1 G/DL (ref 3.4–4.8)
ALBUMIN/GLOB SERPL: 1 RATIO (ref 1.1–2)
ALP SERPL-CCNC: 65 UNIT/L (ref 40–150)
ALT SERPL-CCNC: 34 UNIT/L (ref 0–55)
ANION GAP SERPL CALC-SCNC: 10 MEQ/L
AST SERPL-CCNC: 62 UNIT/L (ref 5–34)
BACTERIA #/AREA URNS AUTO: ABNORMAL /HPF
BASOPHILS # BLD AUTO: 0.08 X10(3)/MCL
BASOPHILS NFR BLD AUTO: 0.6 %
BILIRUB SERPL-MCNC: 1 MG/DL
BILIRUB UR QL STRIP.AUTO: NEGATIVE
BUN SERPL-MCNC: 35.4 MG/DL (ref 8.4–25.7)
CALCIUM SERPL-MCNC: 9.9 MG/DL (ref 8.8–10)
CHLORIDE SERPL-SCNC: 125 MMOL/L (ref 98–107)
CLARITY UR: CLEAR
CO2 SERPL-SCNC: 20 MMOL/L (ref 23–31)
COLOR UR AUTO: YELLOW
CREAT SERPL-MCNC: 1.84 MG/DL (ref 0.73–1.18)
CREAT/UREA NIT SERPL: 19
EOSINOPHIL # BLD AUTO: 0.29 X10(3)/MCL (ref 0–0.9)
EOSINOPHIL NFR BLD AUTO: 2.3 %
ERYTHROCYTE [DISTWIDTH] IN BLOOD BY AUTOMATED COUNT: 15 % (ref 11.5–17)
GFR SERPLBLD CREATININE-BSD FMLA CKD-EPI: 35 ML/MIN/1.73/M2
GLOBULIN SER-MCNC: 3.2 GM/DL (ref 2.4–3.5)
GLUCOSE SERPL-MCNC: 95 MG/DL (ref 82–115)
GLUCOSE UR QL STRIP: NORMAL
HCT VFR BLD AUTO: 37.9 % (ref 42–52)
HGB BLD-MCNC: 12.9 G/DL (ref 14–18)
HGB UR QL STRIP: NEGATIVE
HYALINE CASTS #/AREA URNS LPF: ABNORMAL /LPF
IMM GRANULOCYTES # BLD AUTO: 0.12 X10(3)/MCL (ref 0–0.04)
IMM GRANULOCYTES NFR BLD AUTO: 1 %
KETONES UR QL STRIP: NEGATIVE
LEUKOCYTE ESTERASE UR QL STRIP: NEGATIVE
LYMPHOCYTES # BLD AUTO: 1.66 X10(3)/MCL (ref 0.6–4.6)
LYMPHOCYTES NFR BLD AUTO: 13.2 %
MAGNESIUM SERPL-MCNC: 2.6 MG/DL (ref 1.6–2.6)
MCH RBC QN AUTO: 32.7 PG (ref 27–31)
MCHC RBC AUTO-ENTMCNC: 34 G/DL (ref 33–36)
MCV RBC AUTO: 95.9 FL (ref 80–94)
MONOCYTES # BLD AUTO: 0.99 X10(3)/MCL (ref 0.1–1.3)
MONOCYTES NFR BLD AUTO: 7.8 %
MUCOUS THREADS URNS QL MICRO: ABNORMAL /LPF
NEUTROPHILS # BLD AUTO: 9.48 X10(3)/MCL (ref 2.1–9.2)
NEUTROPHILS NFR BLD AUTO: 75.1 %
NITRITE UR QL STRIP: NEGATIVE
NRBC BLD AUTO-RTO: 0 %
OSMOLALITY SERPL: 326 MOSM/KG (ref 280–300)
OSMOLALITY UR: 577 MOSM/KG (ref 300–1300)
PH UR STRIP: 5 [PH]
PHOSPHATE SERPL-MCNC: 3 MG/DL (ref 2.3–4.7)
PLATELET # BLD AUTO: 261 X10(3)/MCL (ref 130–400)
PLATELETS.RETICULATED NFR BLD AUTO: 3 % (ref 0.9–11.2)
PMV BLD AUTO: 10.2 FL (ref 7.4–10.4)
POTASSIUM SERPL-SCNC: 3.8 MMOL/L (ref 3.5–5.1)
PROT SERPL-MCNC: 6.3 GM/DL (ref 5.8–7.6)
PROT UR QL STRIP: NEGATIVE
RBC # BLD AUTO: 3.95 X10(6)/MCL (ref 4.7–6.1)
RBC #/AREA URNS AUTO: ABNORMAL /HPF
SODIUM SERPL-SCNC: 155 MMOL/L (ref 136–145)
SODIUM UR-SCNC: 66 MMOL/L
SP GR UR STRIP.AUTO: 1.02 (ref 1–1.03)
SQUAMOUS #/AREA URNS LPF: ABNORMAL /HPF
UROBILINOGEN UR STRIP-ACNC: NORMAL
WBC # BLD AUTO: 12.62 X10(3)/MCL (ref 4.5–11.5)
WBC #/AREA URNS AUTO: ABNORMAL /HPF

## 2024-08-14 PROCEDURE — 99285 EMERGENCY DEPT VISIT HI MDM: CPT | Mod: 25

## 2024-08-14 PROCEDURE — 80053 COMPREHEN METABOLIC PANEL: CPT

## 2024-08-14 PROCEDURE — 11000001 HC ACUTE MED/SURG PRIVATE ROOM

## 2024-08-14 PROCEDURE — 83735 ASSAY OF MAGNESIUM: CPT | Performed by: EMERGENCY MEDICINE

## 2024-08-14 PROCEDURE — 84100 ASSAY OF PHOSPHORUS: CPT | Performed by: EMERGENCY MEDICINE

## 2024-08-14 PROCEDURE — 83935 ASSAY OF URINE OSMOLALITY: CPT | Performed by: EMERGENCY MEDICINE

## 2024-08-14 PROCEDURE — 81001 URINALYSIS AUTO W/SCOPE: CPT | Performed by: INTERNAL MEDICINE

## 2024-08-14 PROCEDURE — 96360 HYDRATION IV INFUSION INIT: CPT

## 2024-08-14 PROCEDURE — 85025 COMPLETE CBC W/AUTO DIFF WBC: CPT

## 2024-08-14 PROCEDURE — 63600175 PHARM REV CODE 636 W HCPCS: Performed by: NURSE PRACTITIONER

## 2024-08-14 PROCEDURE — 83930 ASSAY OF BLOOD OSMOLALITY: CPT | Performed by: EMERGENCY MEDICINE

## 2024-08-14 PROCEDURE — 25000003 PHARM REV CODE 250: Performed by: INTERNAL MEDICINE

## 2024-08-14 PROCEDURE — 84300 ASSAY OF URINE SODIUM: CPT | Performed by: EMERGENCY MEDICINE

## 2024-08-14 PROCEDURE — 21400001 HC TELEMETRY ROOM

## 2024-08-14 PROCEDURE — 63600175 PHARM REV CODE 636 W HCPCS: Performed by: EMERGENCY MEDICINE

## 2024-08-14 RX ORDER — ROSUVASTATIN CALCIUM 40 MG/1
40 TABLET, COATED ORAL NIGHTLY
Status: ON HOLD | COMMUNITY
End: 2024-08-30 | Stop reason: HOSPADM

## 2024-08-14 RX ORDER — ACETAMINOPHEN 325 MG/1
650 TABLET ORAL EVERY 4 HOURS PRN
Status: DISCONTINUED | OUTPATIENT
Start: 2024-08-14 | End: 2024-08-20 | Stop reason: HOSPADM

## 2024-08-14 RX ORDER — RANOLAZINE 500 MG/1
1000 TABLET, EXTENDED RELEASE ORAL 2 TIMES DAILY
COMMUNITY
End: 2024-08-14 | Stop reason: CLARIF

## 2024-08-14 RX ORDER — ONDANSETRON HYDROCHLORIDE 2 MG/ML
4 INJECTION, SOLUTION INTRAVENOUS EVERY 6 HOURS PRN
Status: DISCONTINUED | OUTPATIENT
Start: 2024-08-14 | End: 2024-08-20 | Stop reason: HOSPADM

## 2024-08-14 RX ORDER — TAMSULOSIN HYDROCHLORIDE 0.4 MG/1
1 CAPSULE ORAL DAILY
Status: ON HOLD | COMMUNITY
Start: 2024-05-23 | End: 2024-08-30 | Stop reason: HOSPADM

## 2024-08-14 RX ORDER — CLOPIDOGREL BISULFATE 75 MG/1
75 TABLET ORAL DAILY
Status: DISCONTINUED | OUTPATIENT
Start: 2024-08-15 | End: 2024-08-20 | Stop reason: HOSPADM

## 2024-08-14 RX ORDER — TAMSULOSIN HYDROCHLORIDE 0.4 MG/1
1 CAPSULE ORAL DAILY
Status: DISCONTINUED | OUTPATIENT
Start: 2024-08-15 | End: 2024-08-15

## 2024-08-14 RX ORDER — FAMOTIDINE 20 MG/1
40 TABLET, FILM COATED ORAL DAILY
Status: DISCONTINUED | OUTPATIENT
Start: 2024-08-15 | End: 2024-08-20 | Stop reason: HOSPADM

## 2024-08-14 RX ORDER — ENOXAPARIN SODIUM 100 MG/ML
30 INJECTION SUBCUTANEOUS EVERY 24 HOURS
Status: DISCONTINUED | OUTPATIENT
Start: 2024-08-14 | End: 2024-08-20 | Stop reason: HOSPADM

## 2024-08-14 RX ORDER — NAPROXEN SODIUM 220 MG/1
81 TABLET, FILM COATED ORAL DAILY
Status: ON HOLD | COMMUNITY
End: 2024-08-30 | Stop reason: HOSPADM

## 2024-08-14 RX ORDER — FINASTERIDE 5 MG/1
5 TABLET, FILM COATED ORAL DAILY
Status: ON HOLD | COMMUNITY
End: 2024-08-30 | Stop reason: HOSPADM

## 2024-08-14 RX ORDER — RANOLAZINE 1000 MG/1
1000 TABLET, EXTENDED RELEASE ORAL 2 TIMES DAILY
Status: ON HOLD | COMMUNITY
End: 2024-08-30 | Stop reason: HOSPADM

## 2024-08-14 RX ORDER — HYDRALAZINE HYDROCHLORIDE 20 MG/ML
10 INJECTION INTRAMUSCULAR; INTRAVENOUS EVERY 4 HOURS PRN
Status: DISCONTINUED | OUTPATIENT
Start: 2024-08-14 | End: 2024-08-20 | Stop reason: HOSPADM

## 2024-08-14 RX ORDER — DEXTROSE MONOHYDRATE 50 MG/ML
INJECTION, SOLUTION INTRAVENOUS CONTINUOUS
Status: DISCONTINUED | OUTPATIENT
Start: 2024-08-14 | End: 2024-08-15

## 2024-08-14 RX ORDER — FAMOTIDINE 40 MG/1
40 TABLET, FILM COATED ORAL DAILY
Status: ON HOLD | COMMUNITY
End: 2024-08-30 | Stop reason: HOSPADM

## 2024-08-14 RX ORDER — NAPROXEN SODIUM 220 MG/1
81 TABLET, FILM COATED ORAL DAILY
Status: DISCONTINUED | OUTPATIENT
Start: 2024-08-15 | End: 2024-08-20 | Stop reason: HOSPADM

## 2024-08-14 RX ORDER — ATORVASTATIN CALCIUM 10 MG/1
20 TABLET, FILM COATED ORAL DAILY
Status: DISCONTINUED | OUTPATIENT
Start: 2024-08-15 | End: 2024-08-20 | Stop reason: HOSPADM

## 2024-08-14 RX ORDER — FUROSEMIDE 20 MG/1
20 TABLET ORAL DAILY
Status: ON HOLD | COMMUNITY
End: 2024-08-20 | Stop reason: HOSPADM

## 2024-08-14 RX ORDER — FINASTERIDE 5 MG/1
5 TABLET, FILM COATED ORAL DAILY
Status: DISCONTINUED | OUTPATIENT
Start: 2024-08-15 | End: 2024-08-20 | Stop reason: HOSPADM

## 2024-08-14 RX ORDER — METOPROLOL TARTRATE 25 MG/1
25 TABLET, FILM COATED ORAL 2 TIMES DAILY
Status: ON HOLD | COMMUNITY
End: 2024-08-20 | Stop reason: HOSPADM

## 2024-08-14 RX ORDER — CLOPIDOGREL BISULFATE 75 MG/1
75 TABLET ORAL DAILY
Status: ON HOLD | COMMUNITY
Start: 2024-07-08 | End: 2024-08-30 | Stop reason: HOSPADM

## 2024-08-14 RX ADMIN — DEXTROSE MONOHYDRATE: 50 INJECTION, SOLUTION INTRAVENOUS at 10:08

## 2024-08-14 RX ADMIN — DEXTROSE MONOHYDRATE: 50 INJECTION, SOLUTION INTRAVENOUS at 02:08

## 2024-08-14 RX ADMIN — SODIUM CHLORIDE, POTASSIUM CHLORIDE, SODIUM LACTATE AND CALCIUM CHLORIDE 1000 ML: 600; 310; 30; 20 INJECTION, SOLUTION INTRAVENOUS at 01:08

## 2024-08-14 RX ADMIN — ENOXAPARIN SODIUM 30 MG: 40 INJECTION SUBCUTANEOUS at 06:08

## 2024-08-14 NOTE — ED PROVIDER NOTES
Encounter Date: 8/14/2024       History     Chief Complaint   Patient presents with    abnormal labs      Sent from [facility] for abnormal labs. Per EMS NH staff reports hypernatremia. Pt arrived at baseline GCS 8.       89-year-old male with a history of CVA with residual cognitive deficits presents to the emergency department for evaluation of hypernatremia, hyperchloremia.  Reportedly baseline GCS 8.  No reports from the nursing home that he had any vomiting, diarrhea or other GI losses.  Unclear if change in recent oral intake    The history is provided by medical records and the EMS personnel.     Review of patient's allergies indicates:  No Known Allergies  No past medical history on file.  No past surgical history on file.  No family history on file.     Review of Systems   Unable to perform ROS: Patient nonverbal       Physical Exam     Initial Vitals [08/14/24 1235]   BP Pulse Resp Temp SpO2   119/63 74 20 97.1 °F (36.2 °C) 100 %      MAP       --         Physical Exam    Nursing note and vitals reviewed.  Constitutional: He appears well-developed and well-nourished.   HENT:   Head: Atraumatic.   Dry oral mucosa   Eyes: Conjunctivae are normal.   Neck: Neck supple.   Normal range of motion.  Cardiovascular:  Normal rate and regular rhythm.           Pulmonary/Chest: Breath sounds normal. No respiratory distress.   Abdominal: Abdomen is soft. He exhibits no distension. There is no abdominal tenderness.   Musculoskeletal:         General: No edema.      Cervical back: Normal range of motion and neck supple.     Neurological:   Lethargic, E4V1M4 (GCS 9 - reportedly baseline per nursing home/EMS report though ED visit from earlier in the month documented baseline GCS 14), grimaces with painful stimuli, eyes will open spontaneously.  Withdraws from IV stick in bilateral extremities   Skin: Skin is warm and dry. Capillary refill takes less than 2 seconds.   Skin tenting         ED Course    Procedures  Labs Reviewed   COMPREHENSIVE METABOLIC PANEL - Abnormal       Result Value    Sodium 155 (*)     Potassium 3.8      Chloride 125 (*)     CO2 20 (*)     Glucose 95      Blood Urea Nitrogen 35.4 (*)     Creatinine 1.84 (*)     Calcium 9.9      Protein Total 6.3      Albumin 3.1 (*)     Globulin 3.2      Albumin/Globulin Ratio 1.0 (*)     Bilirubin Total 1.0      ALP 65      ALT 34      AST 62 (*)     eGFR 35      Anion Gap 10.0      BUN/Creatinine Ratio 19     CBC WITH DIFFERENTIAL - Abnormal    WBC 12.62 (*)     RBC 3.95 (*)     Hgb 12.9 (*)     Hct 37.9 (*)     MCV 95.9 (*)     MCH 32.7 (*)     MCHC 34.0      RDW 15.0      Platelet 261      MPV 10.2      Neut % 75.1      Lymph % 13.2      Mono % 7.8      Eos % 2.3      Basophil % 0.6      Lymph # 1.66      Neut # 9.48 (*)     Mono # 0.99      Eos # 0.29      Baso # 0.08      IG# 0.12 (*)     IG% 1.0      NRBC% 0.0      IPF 3.0     OSMOLALITY, SERUM - Abnormal    Osmolality 326 (*)    MAGNESIUM - Normal    Magnesium Level 2.60     PHOSPHORUS - Normal    Phosphorus Level 3.0     CBC W/ AUTO DIFFERENTIAL    Narrative:     The following orders were created for panel order CBC auto differential.  Procedure                               Abnormality         Status                     ---------                               -----------         ------                     CBC with Differential[[id number]]       Abnormal            Final result                 Please view results for these tests on the individual orders.   OSMOLALITY, URINE RANDOM   SODIUM, URINE, RANDOM   URINALYSIS, REFLEX TO URINE CULTURE          Imaging Results              X-Ray Chest 1 View (Final result)  Result time 08/14/24 14:21:29      Final result by Rocael Meyers MD (08/14/24 14:21:29)                   Impression:      Suspect minimal left pleural effusion and basilar atelectasis.      Electronically signed by: Rocael Meyers  Date:    08/14/2024  Time:    14:21                Narrative:    EXAMINATION:  XR CHEST 1 VIEW    CLINICAL HISTORY:  LEUKOCYTOSIS;    TECHNIQUE:  One view    COMPARISON:  None available.    FINDINGS:  Cardiopericardial silhouette is within normal limits.  Sternotomy changes.  Suspect minimal left pleural effusion and left basilar atelectasis.  No consolidation, pulmonary edema or pneumothorax.                                       CT Head Without Contrast (Final result)  Result time 08/14/24 14:18:49      Final result by Rocael Meyers MD (08/14/24 14:18:49)                   Impression:      1.  No acute intracranial findings identified.    2.  Chronic microangiopathic ischemia and atrophy.      Electronically signed by: Rocael Meyers  Date:    08/14/2024  Time:    14:18               Narrative:    EXAMINATION:  CT HEAD WITHOUT CONTRAST    CLINICAL HISTORY:  Mental status change, persistent or worsening;    TECHNIQUE:  Sequential axial images were performed of the brain without contrast.    Dose product length of 977 mGycm. Automated exposure control was utilized to minimize radiation dose.    COMPARISON:  None available.    FINDINGS:  There is no intracranial mass effect, midline shift, or hemorrhage.  There is ventriculomegaly which commensurate with the degree of atrophy.  There is no sulcal effacement or low attenuation changes to suggest recent large vessel territory infarction. Chronic appearing periventricular and subcortical white matter low attenuation changes are present and are consistent with chronic microangiopathic ischemia. There is no acute extra axial fluid collection. Visualized paranasal sinuses are clear without mucosal thickening, polypoidal abnormality or air-fluid levels. Mastoid air cells aeration is optimal.                                       Medications   hydrALAZINE injection 10 mg (has no administration in time range)   ondansetron injection 4 mg (has no administration in time range)   acetaminophen tablet 650 mg (has no  administration in time range)   D5W infusion ( Intravenous New Bag 8/14/24 1411)   lactated ringers bolus 1,000 mL (0 mLs Intravenous Stopped 8/14/24 1353)     Medical Decision Making  Problems Addressed:  Acute kidney injury: acute illness or injury  Dehydration: acute illness or injury  Hypernatremia: acute illness or injury  Metabolic encephalopathy: acute illness or injury    Amount and/or Complexity of Data Reviewed  Radiology: ordered.    Risk  Decision regarding hospitalization.      ED assessment:    Mr. Colorado presented for evaluation of hypernatremia, hyperchloremia noted on outpatient labs today.  Unclear with the patient having any recent symptoms as nursing home did not have that information for EMS personnel.  Oral mucosa dry, skin tenting consistent with dehydration    Differential diagnosis (including but not limited to):   Dehydration, acute kidney injury, electrolyte derangements, hypernatremia, hyperchloremia, urinary tract infection, inadequate oral intake, dementia    ED management:   Laboratory studies completed by the nursing home with significant hypernatremia, hyperchloremia, acute kidney injury and keeping with likely volume depletion, inadequate free water intake.  Notably also with recent ED visit related to fall, sutures in place to right forearm.  Head CT at that time demonstrated no acute abnormalities; however, with further cognitive decline, repeat CT obtained which thankfully demonstrated no acute injury.  He was started on IV fluid for volume expansion, osmolality studies have been sent to help guide further resuscitation during admission.    My independent radiology interpretation:   CT head without IV contrast: No acute intracranial hemorrhage or mass lesion      Amount and/or Complexity of Data Reviewed  Independent historian: EMS   Summary of history:  Entirety of history provided by EMS as otherwise limited due to patient's current cognitive status  External data reviewed: no  prior records available for review   Summary of data reviewed:  Recent ED visit related to fall, closed head injury.  No acute findings on head CT at that time.  It was reported at that time that the patient's baseline GCS was 14 however no specific details of neuro exam documented.  Risk and benefits of testing: discussed   Labs: ordered and reviewed  Radiology: ordered and independent interpretation performed (see above or ED course)  ECG/medicine tests: ordered and independent interpretation performed (see above or ED course)  Discussion of management or test interpretation with external provider(s): discussed with hospitalist physician   Summary of discussion:  Discussed with hospitalist NP who accepts for admit    Risk  Prescription drug management   Decision regarding hospitalization  Shared decision making     Critical Care  none    I, Terrie Meza MD personally performed the history, PE, MDM, and procedures as documented above and agree with the scribe's documentation.                                     Clinical Impression:  Final diagnoses:  [E87.0] Hypernatremia (Primary)  [N17.9] Acute kidney injury  [G93.41] Metabolic encephalopathy  [E86.0] Dehydration          ED Disposition Condition    Admit Stable                Terrie Meza MD  08/15/24 5194

## 2024-08-14 NOTE — H&P
[facility] Hospital Medicine - H&P Note    Patient Name: Nirmal [place]  : 1934  MRN: [id number]  PCP: Alexander Guallpa MD  Admitting Physician:  MARIN Davis MD  Admission Class: IP- Inpatient   Code status: DNR    Allergies   Patient has no known allergies.    Chief Complaint   Unable to obtain      History of Present Illness   89 yr old male whose history includes CAD, HTN, CKD 3B and dementia. Resident at [facility]. At the time of my exam patient is awake and alert but oriented to himself only. Moving all extremities but no obeying commands. I called and spoke to his nurse at the nursing home, Sabrina, who reports he's been more lethargic than usual but otherwise has been in his usual state of health. His current mentation is his baseline. Does not ambulate and is bed/chair bound. No recent fever, vomiting or diarrhea. Has some stitches in right forearm but laceration he sustained 2 days ago when his arm somehow got caught in his wheelchair. Sent to ED for evaluation of abnormal labs.     VS on arrival: T 97.1, P 74, R 20, B/P 119/63, Sats 100% on room air. Initial labs? EBC 12.62, Hgb 12.9, Hct 37.9, platelets 261, Na 158, K 3.1, Cl 123, bicarb 23, BUN 37.7, creatinine 1.79 and otherwise unremarkable. No evidence of UTI.  CT head negative for acute intracranial findings.  Chest x-ray shows suspected minimal left pleural effusion and basilar atelectasis.  1 L LR given in ED and started on a D5W infusion at 125 mL/hour.    ROS   Unable to obtain. Please see HPI.     Past Medical History   Hypertension   Coronary artery disease  Dyslipidemia  Dementia  BPH  CKD 3B    Past Surgical History   CABG    Social History   Resident at [facility]. No current nicotine or ETOH use.     Screening for Social Drivers for health:  Patient screened for food insecurity, housing instability, transportation needs, utility difficulties, and interpersonal  "safety (select all that apply as identified as concern)  []Housing or Food  []Transportation Needs  []Utility Difficulties  []Interpersonal safety  [x]None    Family History   Reviewed and negative    Home Medications     Prior to Admission medications    Medication Sig Start Date End Date Taking? Authorizing Provider   clopidogreL (PLAVIX) 75 mg tablet Take 75 mg by mouth once. 7/8/24  Yes Provider, Historical   tamsulosin (FLOMAX) 0.4 mg Cap Take 1 capsule by mouth once daily. 5/23/24  Yes Provider, Historical   aspirin 81 MG Chew Take 81 mg by mouth once daily.    Provider, Historical   famotidine (PEPCID) 40 MG tablet Take 40 mg by mouth once daily.    Provider, Historical   finasteride (PROSCAR) 5 mg tablet Take 5 mg by mouth once daily.    Provider, Historical   furosemide (LASIX) 20 MG tablet Take 20 mg by mouth once daily.    Provider, Historical   metoprolol tartrate (LOPRESSOR) 25 MG tablet Take 25 mg by mouth 2 (two) times daily.    Provider, Historical   ranolazine (RANEXA) 1,000 MG Tb12 Take 1,000 mg by mouth 2 (two) times daily.    Provider, Historical   rosuvastatin (CRESTOR) 40 MG Tab Take 40 mg by mouth every evening.    Provider, Historical        Physical Exam   Vital Signs  Temp:  [97.1 °F (36.2 °C)]   Pulse:  [74-87]   Resp:  [18-21]   BP: ()/(60-72)   SpO2:  [97 %-100 %]    General: Appears comfortable  HEENT: NC/AT  Neck:  No JVD  Chest: CTABL  CVS: Regular rhythm. Normal S1/S2.  Abdomen: nondistended, normoactive BS, soft and non-tender.  MSK: No obvious deformity or joint swelling  Skin: Warm and dry. Stitches right forearm.   Neuro: AAOx1, no focal neurological deficit  Psych: unooperative      Labs     Recent Labs     08/14/24  0618 08/14/24  1257   WBC 12.44* 12.62*   RBC 3.95* 3.95*   HGB 12.8* 12.9*   HCT 38.9* 37.9*   MCV 98.5* 95.9*   MCH 32.4* 32.7*   MCHC 32.9* 34.0   RDW 14.8 15.0    261     No results for input(s): "PROTIME", "INR", "PTT", "D-DIMER", "FERRITIN", " ""IRON", "TRANS", "TIBC", "LABIRON", "[id number]", "FOLATE", "LDH", "HAPTOGLOBIN", "RETICCNTAUTO", "RETABS", "PERIPSMEAREV" in the last 72 hours.   Recent Labs     08/14/24  0618 08/14/24  1257   * 155*   K 3.1* 3.8   CO2 23 20*   BUN 37.7* 35.4*   CREATININE 1.79* 1.84*   EGFRNORACEVR 36 35   GLUCOSE 89 95   CALCIUM 10.2* 9.9   MG  --  2.60   PHOS  --  3.0   ALBUMIN  --  3.1*   GLOBULIN  --  3.2   ALKPHOS  --  65   ALT  --  34   AST  --  62*   BILITOT  --  1.0   CHOL 153  --    TRIG 140  --    LDL 84.00  --    HDL 41  --      No results for input(s): "LACTIC" in the last 72 hours.  No results for input(s): "CPK", "TROPONINI" in the last 72 hours.     Microbiology Results (last 7 days)       ** No results found for the last 168 hours. **           Imaging   X-Ray Chest 1 View  Narrative: EXAMINATION:  XR CHEST 1 VIEW    CLINICAL HISTORY:  LEUKOCYTOSIS;    TECHNIQUE:  One view    COMPARISON:  None available.    FINDINGS:  Cardiopericardial silhouette is within normal limits.  Sternotomy changes.  Suspect minimal left pleural effusion and left basilar atelectasis.  No consolidation, pulmonary edema or pneumothorax.  Impression: Suspect minimal left pleural effusion and basilar atelectasis.    Electronically signed by: Rocael Meyers  Date:    08/14/2024  Time:    14:21  CT Head Without Contrast  Narrative: EXAMINATION:  CT HEAD WITHOUT CONTRAST    CLINICAL HISTORY:  Mental status change, persistent or worsening;    TECHNIQUE:  Sequential axial images were performed of the brain without contrast.    Dose product length of 977 mGycm. Automated exposure control was utilized to minimize radiation dose.    COMPARISON:  None available.    FINDINGS:  There is no intracranial mass effect, midline shift, or hemorrhage.  There is ventriculomegaly which commensurate with the degree of atrophy.  There is no sulcal effacement or low attenuation changes to suggest recent large vessel territory infarction. Chronic appearing " periventricular and subcortical white matter low attenuation changes are present and are consistent with chronic microangiopathic ischemia. There is no acute extra axial fluid collection. Visualized paranasal sinuses are clear without mucosal thickening, polypoidal abnormality or air-fluid levels. Mastoid air cells aeration is optimal.  Impression: 1.  No acute intracranial findings identified.    2.  Chronic microangiopathic ischemia and atrophy.    Electronically signed by: Rocael Meyers  Date:    08/14/2024  Time:    14:18    Assessment & Plan   Hypernatremia secondary to dehydration  - urine osmolality pending  - continue D5W at 125 ml/hr    ALFONSO on CKD 3A  - secondary to above  - avoid nephrotoxic meds  - continue IV hydration  - labs in AM    Leukocytosis  - no evidence of UTI  - no infiltrate or consolidation on chest x-ray    Laceration right forearm - POA  - routine wound care - keep covered    PMH: CAD, BPH, dementia    VTE Prophylaxis: lovenox      IKarely, SHAUNA-BC have discussed this patients case with Dr. Davis who agrees with the diagnosis and treatment plan.

## 2024-08-14 NOTE — Clinical Note
Diagnosis: Hypernatremia [365711]   Future Attending Provider: FATOU POSEY [[id number]]   Admit to which facility:: [facility] [[id number]]   Reason for IP Medical Treatment  (Clinical interventions that can only be accomplished in the IP setting? ) :: IV fluids   Plans for Post-Acute care--if anticipated (pick the single best option):: A. No post acute care anticipated at this time

## 2024-08-15 PROBLEM — E43 SEVERE MALNUTRITION: Status: ACTIVE | Noted: 2024-08-15

## 2024-08-15 LAB
ALBUMIN SERPL-MCNC: 2.9 G/DL (ref 3.4–4.8)
ALBUMIN/GLOB SERPL: 1.3 RATIO (ref 1.1–2)
ALP SERPL-CCNC: 61 UNIT/L (ref 40–150)
ALT SERPL-CCNC: 34 UNIT/L (ref 0–55)
ANION GAP SERPL CALC-SCNC: 9 MEQ/L
AST SERPL-CCNC: 56 UNIT/L (ref 5–34)
BASOPHILS # BLD AUTO: 0.06 X10(3)/MCL
BASOPHILS NFR BLD AUTO: 0.5 %
BILIRUB SERPL-MCNC: 1.1 MG/DL
BUN SERPL-MCNC: 27.2 MG/DL (ref 8.4–25.7)
CALCIUM SERPL-MCNC: 9.4 MG/DL (ref 8.8–10)
CHLORIDE SERPL-SCNC: 119 MMOL/L (ref 98–107)
CO2 SERPL-SCNC: 21 MMOL/L (ref 23–31)
CREAT SERPL-MCNC: 1.46 MG/DL (ref 0.73–1.18)
CREAT/UREA NIT SERPL: 19
EOSINOPHIL # BLD AUTO: 0.43 X10(3)/MCL (ref 0–0.9)
EOSINOPHIL NFR BLD AUTO: 3.8 %
ERYTHROCYTE [DISTWIDTH] IN BLOOD BY AUTOMATED COUNT: 14.7 % (ref 11.5–17)
GFR SERPLBLD CREATININE-BSD FMLA CKD-EPI: 46 ML/MIN/1.73/M2
GLOBULIN SER-MCNC: 2.2 GM/DL (ref 2.4–3.5)
GLUCOSE SERPL-MCNC: 103 MG/DL (ref 82–115)
HCT VFR BLD AUTO: 34.7 % (ref 42–52)
HGB BLD-MCNC: 11.7 G/DL (ref 14–18)
IMM GRANULOCYTES # BLD AUTO: 0.12 X10(3)/MCL (ref 0–0.04)
IMM GRANULOCYTES NFR BLD AUTO: 1.1 %
LYMPHOCYTES # BLD AUTO: 1.92 X10(3)/MCL (ref 0.6–4.6)
LYMPHOCYTES NFR BLD AUTO: 17.2 %
MAGNESIUM SERPL-MCNC: 2.2 MG/DL (ref 1.6–2.6)
MCH RBC QN AUTO: 32.5 PG (ref 27–31)
MCHC RBC AUTO-ENTMCNC: 33.7 G/DL (ref 33–36)
MCV RBC AUTO: 96.4 FL (ref 80–94)
MONOCYTES # BLD AUTO: 0.89 X10(3)/MCL (ref 0.1–1.3)
MONOCYTES NFR BLD AUTO: 8 %
NEUTROPHILS # BLD AUTO: 7.76 X10(3)/MCL (ref 2.1–9.2)
NEUTROPHILS NFR BLD AUTO: 69.4 %
NRBC BLD AUTO-RTO: 0 %
PLATELET # BLD AUTO: 223 X10(3)/MCL (ref 130–400)
PLATELETS.RETICULATED NFR BLD AUTO: 3.6 % (ref 0.9–11.2)
PMV BLD AUTO: 10.4 FL (ref 7.4–10.4)
POTASSIUM SERPL-SCNC: 2.9 MMOL/L (ref 3.5–5.1)
PROT SERPL-MCNC: 5.1 GM/DL (ref 5.8–7.6)
RBC # BLD AUTO: 3.6 X10(6)/MCL (ref 4.7–6.1)
SODIUM SERPL-SCNC: 149 MMOL/L (ref 136–145)
WBC # BLD AUTO: 11.18 X10(3)/MCL (ref 4.5–11.5)

## 2024-08-15 PROCEDURE — 85025 COMPLETE CBC W/AUTO DIFF WBC: CPT | Performed by: PHYSICIAN ASSISTANT

## 2024-08-15 PROCEDURE — 80053 COMPREHEN METABOLIC PANEL: CPT | Performed by: PHYSICIAN ASSISTANT

## 2024-08-15 PROCEDURE — 63600175 PHARM REV CODE 636 W HCPCS: Performed by: STUDENT IN AN ORGANIZED HEALTH CARE EDUCATION/TRAINING PROGRAM

## 2024-08-15 PROCEDURE — 21400001 HC TELEMETRY ROOM

## 2024-08-15 PROCEDURE — 25000003 PHARM REV CODE 250: Performed by: INTERNAL MEDICINE

## 2024-08-15 PROCEDURE — 63600175 PHARM REV CODE 636 W HCPCS: Performed by: NURSE PRACTITIONER

## 2024-08-15 PROCEDURE — 36415 COLL VENOUS BLD VENIPUNCTURE: CPT | Performed by: PHYSICIAN ASSISTANT

## 2024-08-15 PROCEDURE — 25000003 PHARM REV CODE 250: Performed by: STUDENT IN AN ORGANIZED HEALTH CARE EDUCATION/TRAINING PROGRAM

## 2024-08-15 PROCEDURE — 83735 ASSAY OF MAGNESIUM: CPT | Performed by: STUDENT IN AN ORGANIZED HEALTH CARE EDUCATION/TRAINING PROGRAM

## 2024-08-15 RX ORDER — POTASSIUM CHLORIDE, DEXTROSE MONOHYDRATE 150; 5 MG/100ML; G/100ML
INJECTION, SOLUTION INTRAVENOUS CONTINUOUS
Status: DISCONTINUED | OUTPATIENT
Start: 2024-08-15 | End: 2024-08-18

## 2024-08-15 RX ADMIN — CLOPIDOGREL BISULFATE 75 MG: 75 TABLET ORAL at 08:08

## 2024-08-15 RX ADMIN — FINASTERIDE 5 MG: 5 TABLET, FILM COATED ORAL at 08:08

## 2024-08-15 RX ADMIN — POTASSIUM CHLORIDE AND DEXTROSE MONOHYDRATE: 150; 5 INJECTION, SOLUTION INTRAVENOUS at 12:08

## 2024-08-15 RX ADMIN — ENOXAPARIN SODIUM 30 MG: 40 INJECTION SUBCUTANEOUS at 05:08

## 2024-08-15 RX ADMIN — POTASSIUM CHLORIDE AND DEXTROSE MONOHYDRATE: 150; 5 INJECTION, SOLUTION INTRAVENOUS at 09:08

## 2024-08-15 RX ADMIN — FAMOTIDINE 40 MG: 20 TABLET, FILM COATED ORAL at 08:08

## 2024-08-15 RX ADMIN — DEXTROSE MONOHYDRATE: 50 INJECTION, SOLUTION INTRAVENOUS at 06:08

## 2024-08-15 RX ADMIN — ATORVASTATIN CALCIUM 20 MG: 10 TABLET, FILM COATED ORAL at 08:08

## 2024-08-15 RX ADMIN — POTASSIUM BICARBONATE 40 MEQ: 391 TABLET, EFFERVESCENT ORAL at 08:08

## 2024-08-15 RX ADMIN — ASPIRIN 81 MG CHEWABLE TABLET 81 MG: 81 TABLET CHEWABLE at 08:08

## 2024-08-15 NOTE — CONSULTS
Inpatient Nutrition Assessment    Admit Date: 8/14/2024   Total duration of encounter: 1 day   Patient Age: 89 y.o.    Nutrition Recommendation/Prescription     Diet [Diet Pureed (IDDSI Level 4)] as tolerated.   Add Boost Very High Calorie (provides 530 kcal, 22 g protein per serving) BID.    Communication of Recommendations: reviewed with nurse    Nutrition Assessment     Malnutrition Assessment/Nutrition-Focused Physical Exam    Malnutrition Context: chronic illness (08/15/24 1255)  Malnutrition Level: severe (08/15/24 1255)  Energy Intake (Malnutrition):  (unable to evaluate) (08/15/24 1255)  Weight Loss (Malnutrition):  (unable to evaluate) (08/15/24 1255)  Subcutaneous Fat (Malnutrition): severe depletion (08/15/24 1255)     Upper Arm Region (Subcutaneous Fat Loss): severe depletion     Muscle Mass (Malnutrition): severe depletion (08/15/24 1255)  [place] (Muscle Loss): severe depletion                 Anterior Thigh Region (Muscle Loss): severe depletion              A minimum of two characteristics is recommended for diagnosis of either severe or non-severe malnutrition.    Chart Review    Reason Seen: continuous nutrition monitoring, malnutrition screening tool (MST), and physician consult for malnutrition/dehydration    Malnutrition Screening Tool Results   Have you recently lost weight without trying?: Unsure  Have you been eating poorly because of a decreased appetite?: Yes   MST Score: 3   Diagnosis:  Hypernatremia/dehydration  ALFONSO/CKD  Leukocytosis  Laceration right forearm    Relevant Medical History: CAD, HTN, CKD, dementia     Scheduled Medications:  aspirin, 81 mg, Daily  atorvastatin, 20 mg, Daily  clopidogreL, 75 mg, Daily  enoxparin, 30 mg, Q24H (prophylaxis, 1700)  famotidine, 40 mg, Daily  finasteride, 5 mg, Daily    Continuous Infusions:  dextrose 5 % with KCl 20 mEq, Last Rate: 125 mL/hr at 08/15/24 1219    PRN Medications:   acetaminophen, 650 mg, Q4H PRN  hydrALAZINE, 10 mg, Q4H  PRN  ondansetron, 4 mg, Q6H PRN    Calorie Containing IV Medications: no significant kcals from medications at this time    Recent Labs   Lab 08/14/24  0618 08/14/24  1257 08/15/24  0514   * 155* 149*   K 3.1* 3.8 2.9*   CALCIUM 10.2* 9.9 9.4   PHOS  --  3.0  --    MG  --  2.60 2.20   CO2 23 20* 21*   BUN 37.7* 35.4* 27.2*   CREATININE 1.79* 1.84* 1.46*   EGFRNORACEVR 36 35 46   GLUCOSE 89 95 103   BILITOT  --  1.0 1.1   ALKPHOS  --  65 61   ALT  --  34 34   AST  --  62* 56*   ALBUMIN  --  3.1* 2.9*   TRIG 140  --   --    WBC 12.44* 12.62* 11.18   HGB 12.8* 12.9* 11.7*   HCT 38.9* 37.9* 34.7*     Nutrition Orders:  Diet Pureed (IDDSI Level 4)    Appetite/Oral Intake: poor/25-50% of meals  Factors Affecting Nutritional Intake: impaired cognitive status/motor control and decreased appetite  Social Needs Impacting Access to Food: none identified  Food/Jew/Cultural Preferences: unable to obtain  Food Allergies: no known food allergies  Last Bowel Movement: 08/14/24  Wound(s): no pressure injuries documented at this time     Comments    8/15/24 Patient unable to answer questions, poor intake so far reported by nursing, will add oral supplement.    Anthropometrics    Height: 6' (182.9 cm),    Last Weight: 73.3 kg (161 lb 9.6 oz) (08/15/24 1241), Weight Method: Bed Scale  BMI (Calculated): 21.9  BMI Classification: underweight (BMI less than 22 if >65 years of age)        Ideal Body Weight (IBW), Male: 178 lb     % Ideal Body Weight, Male (lb): 90.79 %                          Usual Weight Provided By: unable to obtain usual weight    Wt Readings from Last 5 Encounters:   08/15/24 73.3 kg (161 lb 9.6 oz)   08/07/24 81.6 kg (180 lb)     Weight Change(s) Since Admission:   (8/15) question accuracy of admission weight, took bed weight (73.3 kg)  Wt Readings from Last 1 Encounters:   08/15/24 1241 73.3 kg (161 lb 9.6 oz)   08/14/24 1235 81.6 kg (180 lb)   Admit Weight: 81.6 kg (180 lb) (08/14/24 1235), Weight  Method: Bed Scale    Estimated Needs    Weight Used For Calorie Calculations: 73.3 kg (161 lb 9.6 oz)  Energy Calorie Requirements (kcal): 8341-4010, 1.2-1.4 stress factor  Energy Need Method: Lyman-St Jeor  Weight Used For Protein Calculations: 73.3 kg (161 lb 9.6 oz)  Protein Requirements:  g, 1.2-1.4 g/kg  Fluid Requirements (mL): 1833, 25 ml/kg      Enteral Nutrition Patient not receiving enteral nutrition at this time.    Parenteral Nutrition Patient not receiving parenteral nutrition support at this time.    Evaluation of Received Nutrient Intake    Calories: not meeting estimated needs  Protein: not meeting estimated needs    Patient Education Not applicable.    Nutrition Diagnosis     PES: Inadequate energy intake related to acute illness as evidenced by less than 80% needs met. (new)  PES: Severe chronic disease or condition related malnutrition related to suboptimal protein/energy intake as evidenced by severe fat depletion and severe muscle depletion. (new)    Nutrition Interventions     Intervention(s): modified composition of meals/snacks, commercial beverage, and collaboration with other providers  Goal: Meet greater than 80% of nutritional needs by follow-up. (new)    Nutrition Goals & Monitoring     Dietitian will monitor: food and beverage intake, energy intake, enteral nutrition intake, weight, electrolyte/renal panel, beliefs/attitudes, glucose/endocrine profile, and gastrointestinal profile  Discharge planning: too early to determine; pending clinical course  Nutrition Risk/Follow-Up: high (follow-up in 1-4 days)   Please consult if re-assessment needed sooner.

## 2024-08-15 NOTE — ASSESSMENT & PLAN NOTE
Patient meets ASPEN criteria for severe malnutrition of chronic illness per RD assessment as evidenced by:  Energy Intake (Malnutrition):  (unable to evaluate)  Weight Loss (Malnutrition):  (unable to evaluate)  Subcutaneous Fat (Malnutrition): severe depletion  Muscle Mass (Malnutrition): severe depletion           A minimum of two characteristics is recommended for diagnosis of either severe or non-severe malnutrition.

## 2024-08-15 NOTE — NURSING
Nurses Note -- 4 Eyes      8/15/2024   2:01 AM      Skin assessed during: Q Shift Change      [] No Altered Skin Integrity Present    []Prevention Measures Documented      [x] Yes- Altered Skin Integrity Present or Discovered   [] LDA Added if Not in Epic (Describe Wound)   [] New Altered Skin Integrity was Present on Admit and Documented in LDA   [] Wound Image Taken    Wound Care Consulted? No    Attending Nurse:  Loren Suh RN/Staff Member:   Sara

## 2024-08-15 NOTE — PROGRESS NOTES
[facility] Hospital Medicine Progress Note        Chief Complaint: Inpatient Follow-up for     HPI:    89-year-old male with significant history of HTN, HLD, chronic kidney disease, advanced dementia with baseline encephalopathy, BPH, CAD on dual antiplatelets was sent to the ED from nursing home with altered mental status, increasing lethargic.  Patient is bed/chair bound at baseline.  He had a right forearm laceration which he sustained 2 days back when he was trying to get into the wheelchair and was caught, laceration was repaired.  Labs ordered given altered mental status/lethargic which showed abnormality and therefore was sent to the ED. hypotensive the ED, slightly improved with IV fluid resuscitation.  Lab significant for mild leukocytosis, free water deficit with sodium 155 and chloride 125, also noted renal insufficiency with creatinine-1.84.  Hospitalist team consulted for admission, he received 1 L Ringer lactate in the ED     Patient was started on D5 and improvement in hypernatremia    Interval Hx:   Patient seen and examined by bedside.  Is oriented to self and place however not time.  Mittens on; not great of an appetite    Case was discussed with patient's nurse and  on the floor.    Objective/physical exam:  General: In no acute distress, afebrile  Chest: Clear to auscultation bilaterally  Heart: RRR, +S1, S2, no appreciable murmur  Abdomen: Soft, nontender, BS +  MSK: Warm, no lower extremity edema, no clubbing or cyanosis  Neurologic: Alert and oriented x4, Cranial nerve II-XII intact, Strength 5/5 in all 4 extremities    VITAL SIGNS: 24 HRS MIN & MAX LAST   Temp  Min: 97.1 °F (36.2 °C)  Max: 98.8 °F (37.1 °C) 97.7 °F (36.5 °C)   BP  Min: 82/48  Max: 119/63 (!) 104/56   Pulse  Min: 74  Max: 90  81   Resp  Min: 18  Max: 25 18   SpO2  Min: 95 %  Max: 100 % 99 %     I have reviewed the following labs:  Recent Labs   Lab 08/14/24  0618 08/14/24  1257  08/15/24  0514   WBC 12.44* 12.62* 11.18   RBC 3.95* 3.95* 3.60*   HGB 12.8* 12.9* 11.7*   HCT 38.9* 37.9* 34.7*   MCV 98.5* 95.9* 96.4*   MCH 32.4* 32.7* 32.5*   MCHC 32.9* 34.0 33.7   RDW 14.8 15.0 14.7    261 223   MPV 10.6* 10.2 10.4     Recent Labs   Lab 08/14/24  0618 08/14/24  1257 08/15/24  0514   * 155* 149*   K 3.1* 3.8 2.9*   * 125* 119*   CO2 23 20* 21*   BUN 37.7* 35.4* 27.2*   CREATININE 1.79* 1.84* 1.46*   CALCIUM 10.2* 9.9 9.4   MG  --  2.60 2.20   ALBUMIN  --  3.1* 2.9*   ALKPHOS  --  65 61   ALT  --  34 34   AST  --  62* 56*   BILITOT  --  1.0 1.1     Microbiology Results (last 7 days)       ** No results found for the last 168 hours. **             See below for Radiology    Assessment/Plan:  Acute dehydration, improved  Severe free water deficit/acute hypernatremic hyperchloremia , improved  ALFONSO on chronic kidney disease, stage III  Acute on chronic encephalopathy-metabolic secondary to free water deficit   History of essential HTN-now borderline hypotensive   History of CAD on dual antiplatelets   HLD   Advanced dementia   BPH   Prophylaxis     Improvement in sodium, at 1:49 a.m. this a.m.   Continue D5 IV fluids today   Severe hypokalemia noted, likely secondary to poor intake   Forty mEq of potassium given and we will also add potassium in fluids  ALFONSO improving   Patient remains intermittently confused   Continue to hold all nephrotoxic agents   Renal ultrasound shows no hydronephrosis   CT head was negative   Leukocytosis resolved   Hold Flomax due to hypotension   Further recs based on clinical course   Labs in a.m.    Critical care note:  Critical care diagnosis:  Hypernatremia requiring D5  Critical care interventions: Hands-on evaluation, review of labs/radiographs/records and discussion with patient and family if present  Critical care time spent: 35 minutes     VTE prophylaxis:  Lovenox    Patient condition:  Guarded    Anticipated discharge and Disposition:   Likely  return back to nursing home      All diagnosis and differential diagnosis have been reviewed; assessment and plan has been documented; I have personally reviewed the labs and test results that are presently available; I have reviewed the patients medication list; I have reviewed the consulting providers response and recommendations. I have reviewed or attempted to review medical records based upon their availability    All of the patient's questions have been  addressed and answered. Patient's is agreeable to the above stated plan. I will continue to monitor closely and make adjustments to medical management as needed.    Portions of this note dictated using EMR integrated voice recognition software, and may be subject to voice recognition errors not corrected at proofreading. Please contact writer for clarification if needed.   _____________________________________________________________________    Malnutrition Status:    Scheduled Med:   aspirin  81 mg Oral Daily    atorvastatin  20 mg Oral Daily    clopidogreL  75 mg Oral Daily    enoxparin  30 mg Subcutaneous Q24H (prophylaxis, 1700)    famotidine  40 mg Oral Daily    finasteride  5 mg Oral Daily      Continuous Infusions:   dextrose 5 % with KCl 20 mEq   Intravenous Continuous          PRN Meds:    Current Facility-Administered Medications:     acetaminophen, 650 mg, Oral, Q4H PRN    hydrALAZINE, 10 mg, Intravenous, Q4H PRN    ondansetron, 4 mg, Intravenous, Q6H PRN     Radiology:  I have personally reviewed the following imaging and agree with the radiologist.     US Retroperitoneal Complete  Narrative: EXAMINATION:  US RETROPERITONEAL COMPLETE    CLINICAL HISTORY:  lucy;    TECHNIQUE:  Ultrasound evaluation of the kidneys, region of the ureters, and urinary bladder.    COMPARISON:  No relevant comparison studies available at the time of dictation.    FINDINGS:  Left kidney obscured by bowel gas.  No right-sided hydronephrosis.    Bladder not visualized,  likely decompressed.    Abdominal aorta and IVC not directly imaged.    Measurements:    - Right kidney: 9.1 cm in length    - Left kidney: Not seen  Impression: Left kidney not visualized.  No right-sided hydronephrosis.    Electronically signed by: Farrukh Sinha  Date:    08/14/2024  Time:    18:09  X-Ray Chest 1 View  Narrative: EXAMINATION:  XR CHEST 1 VIEW    CLINICAL HISTORY:  LEUKOCYTOSIS;    TECHNIQUE:  One view    COMPARISON:  None available.    FINDINGS:  Cardiopericardial silhouette is within normal limits.  Sternotomy changes.  Suspect minimal left pleural effusion and left basilar atelectasis.  No consolidation, pulmonary edema or pneumothorax.  Impression: Suspect minimal left pleural effusion and basilar atelectasis.    Electronically signed by: Rocael Meyers  Date:    08/14/2024  Time:    14:21  CT Head Without Contrast  Narrative: EXAMINATION:  CT HEAD WITHOUT CONTRAST    CLINICAL HISTORY:  Mental status change, persistent or worsening;    TECHNIQUE:  Sequential axial images were performed of the brain without contrast.    Dose product length of 977 mGycm. Automated exposure control was utilized to minimize radiation dose.    COMPARISON:  None available.    FINDINGS:  There is no intracranial mass effect, midline shift, or hemorrhage.  There is ventriculomegaly which commensurate with the degree of atrophy.  There is no sulcal effacement or low attenuation changes to suggest recent large vessel territory infarction. Chronic appearing periventricular and subcortical white matter low attenuation changes are present and are consistent with chronic microangiopathic ischemia. There is no acute extra axial fluid collection. Visualized paranasal sinuses are clear without mucosal thickening, polypoidal abnormality or air-fluid levels. Mastoid air cells aeration is optimal.  Impression: 1.  No acute intracranial findings identified.    2.  Chronic microangiopathic ischemia and atrophy.    Electronically  signed by: Rocael Meyers  Date:    08/14/2024  Time:    14:18      Mame You DO  Department of [facility]  [facility]  08/15/2024

## 2024-08-15 NOTE — PLAN OF CARE
08/15/24 0839   Discharge Assessment   Assessment Type Discharge Planning Assessment   Source of Information health care advocate   Communicated CHELSEA with patient/caregiver Date not available/Unable to determine   Reason For Admission ALFONSO   People in Home facility resident   Facility Arrived From: SNF: [facility]   Do you expect to return to your current living situation? Yes   Discharge Plan A Return to nursing home   Discharge Plan B Return to Nursing Home   OTHER   Name(s) of People in Home Patient at [facility] for skilled services     Patient recently admitted to [facility] for skilled services and will return upon discharge.

## 2024-08-15 NOTE — NURSING
Nurses Note -- 4 Eyes      8/15/2024   11:07 AM      Skin assessed during: Q Shift Change      [] No Altered Skin Integrity Present    []Prevention Measures Documented      [x] Yes- Altered Skin Integrity Present or Discovered   [] LDA Added if Not in Epic (Describe Wound)   [] New Altered Skin Integrity was Present on Admit and Documented in LDA   [x] Wound Image Taken    Wound Care Consulted? Yes    Attending Nurse:  Loren Suh RN/Staff Member:   etahn

## 2024-08-16 LAB
ALBUMIN SERPL-MCNC: 2.7 G/DL (ref 3.4–4.8)
ALBUMIN/GLOB SERPL: 1.2 RATIO (ref 1.1–2)
ALP SERPL-CCNC: 59 UNIT/L (ref 40–150)
ALT SERPL-CCNC: 35 UNIT/L (ref 0–55)
ANION GAP SERPL CALC-SCNC: 9 MEQ/L
AST SERPL-CCNC: 58 UNIT/L (ref 5–34)
BILIRUB SERPL-MCNC: 1.4 MG/DL
BUN SERPL-MCNC: 15.1 MG/DL (ref 8.4–25.7)
CALCIUM SERPL-MCNC: 8.7 MG/DL (ref 8.8–10)
CHLORIDE SERPL-SCNC: 114 MMOL/L (ref 98–107)
CO2 SERPL-SCNC: 19 MMOL/L (ref 23–31)
CREAT SERPL-MCNC: 1.15 MG/DL (ref 0.73–1.18)
CREAT/UREA NIT SERPL: 13
GFR SERPLBLD CREATININE-BSD FMLA CKD-EPI: >60 ML/MIN/1.73/M2
GLOBULIN SER-MCNC: 2.2 GM/DL (ref 2.4–3.5)
GLUCOSE SERPL-MCNC: 111 MG/DL (ref 82–115)
POTASSIUM SERPL-SCNC: 3.4 MMOL/L (ref 3.5–5.1)
PROT SERPL-MCNC: 4.9 GM/DL (ref 5.8–7.6)
SODIUM SERPL-SCNC: 142 MMOL/L (ref 136–145)

## 2024-08-16 PROCEDURE — 25000003 PHARM REV CODE 250: Performed by: STUDENT IN AN ORGANIZED HEALTH CARE EDUCATION/TRAINING PROGRAM

## 2024-08-16 PROCEDURE — 63600175 PHARM REV CODE 636 W HCPCS: Performed by: STUDENT IN AN ORGANIZED HEALTH CARE EDUCATION/TRAINING PROGRAM

## 2024-08-16 PROCEDURE — 97162 PT EVAL MOD COMPLEX 30 MIN: CPT

## 2024-08-16 PROCEDURE — 99221 1ST HOSP IP/OBS SF/LOW 40: CPT | Mod: ,,, | Performed by: INTERNAL MEDICINE

## 2024-08-16 PROCEDURE — 36410 VNPNXR 3YR/> PHY/QHP DX/THER: CPT

## 2024-08-16 PROCEDURE — 36415 COLL VENOUS BLD VENIPUNCTURE: CPT | Performed by: STUDENT IN AN ORGANIZED HEALTH CARE EDUCATION/TRAINING PROGRAM

## 2024-08-16 PROCEDURE — C1751 CATH, INF, PER/CENT/MIDLINE: HCPCS

## 2024-08-16 PROCEDURE — 80053 COMPREHEN METABOLIC PANEL: CPT | Performed by: STUDENT IN AN ORGANIZED HEALTH CARE EDUCATION/TRAINING PROGRAM

## 2024-08-16 PROCEDURE — 63600175 PHARM REV CODE 636 W HCPCS: Performed by: NURSE PRACTITIONER

## 2024-08-16 PROCEDURE — 25000003 PHARM REV CODE 250: Performed by: INTERNAL MEDICINE

## 2024-08-16 PROCEDURE — 21400001 HC TELEMETRY ROOM

## 2024-08-16 RX ORDER — SODIUM CHLORIDE 0.9 % (FLUSH) 0.9 %
10 SYRINGE (ML) INJECTION
Status: DISCONTINUED | OUTPATIENT
Start: 2024-08-16 | End: 2024-08-19

## 2024-08-16 RX ORDER — SODIUM CHLORIDE 0.9 % (FLUSH) 0.9 %
10 SYRINGE (ML) INJECTION EVERY 6 HOURS
Status: DISCONTINUED | OUTPATIENT
Start: 2024-08-17 | End: 2024-08-19

## 2024-08-16 RX ADMIN — ENOXAPARIN SODIUM 30 MG: 40 INJECTION SUBCUTANEOUS at 05:08

## 2024-08-16 RX ADMIN — ATORVASTATIN CALCIUM 20 MG: 10 TABLET, FILM COATED ORAL at 10:08

## 2024-08-16 RX ADMIN — CLOPIDOGREL BISULFATE 75 MG: 75 TABLET ORAL at 10:08

## 2024-08-16 RX ADMIN — POTASSIUM CHLORIDE AND DEXTROSE MONOHYDRATE: 150; 5 INJECTION, SOLUTION INTRAVENOUS at 06:08

## 2024-08-16 RX ADMIN — ASPIRIN 81 MG CHEWABLE TABLET 81 MG: 81 TABLET CHEWABLE at 10:08

## 2024-08-16 RX ADMIN — FAMOTIDINE 40 MG: 20 TABLET, FILM COATED ORAL at 10:08

## 2024-08-16 RX ADMIN — POTASSIUM BICARBONATE 20 MEQ: 391 TABLET, EFFERVESCENT ORAL at 12:08

## 2024-08-16 RX ADMIN — FINASTERIDE 5 MG: 5 TABLET, FILM COATED ORAL at 10:08

## 2024-08-16 NOTE — PLAN OF CARE
Problem: Physical Therapy  Goal: Physical Therapy Goal  Description: Goals to be met by: 24     Patient will increase functional independence with mobility by performin. Supine to sit with MInimal Assistance  2. Sit to supine with MInimal Assistance  3. Sit to stand transfer with Minimal Assistance  4. Bed to chair transfer with Minimal Assistance using Rolling Walker  5. Sitting at edge of bed x20 minutes with Stand-by Assistance    Outcome: Progressing

## 2024-08-16 NOTE — CONSULTS
Patient Name: Nirmal Colorado   MRN: [id number]   Admission Date: 8/14/2024   Hospital Length of Stay: 2   Attending Provider: Mame You DO   Consulting Provider: Wil Chao M.D.  Reason for Consult: Goals of Care  Primary Care Physician: Alexander Guallpa MD     Principal Problem: <principal problem not specified>     Patient information was obtained from nursing home, past medical records, ER records, and primary team.      Final diagnoses:  [E87.0] Hypernatremia (Primary)  [N17.9] Acute kidney injury  [G93.41] Metabolic encephalopathy  [E86.0] Dehydration     Assessment/Plan:   See below HPI & Advance Care Planning    Advance Care Planning     Date: 08/16/2024  Unfortunately there are no ACP documentation in patients chart. We visited patient in hospital room today for further evaluation, review current situation & previous hospital admissions, discuss goals of care, and ensure patient's comfort. Patient was resting comfortably in bed without any appreciable apparent distress though visit was limited by patient's mentation. He was alert though not oriented to person, place, or situation. Also, communication was severely limited. No family members are currently at bedside. We tried multiple attempts to call family members (Alaina Gallegos, Franki Vasquez) with numbers listed in chart however all calls have gone to voicemail. We left voicemail for Alaina Gallegos to please call/visit when able for further discussion regarding above mentioned concerns when able.    Disposition: Will continue attempts to contact patient's family to discuss goals of care.    History of Present Illness:   Mr. Nirmal Colorado is an 88 y/o gentleman with PMHx of CAD (s/p CABG), Severe Aortic Stenosis (s/p TAVR), HTN, HLD, CKD, Suspected Recent CVA, Advanced Dementia, & BPH who, per chart review, presented to [facility] ED on (8/14/24) from SNF ([facility], present there since 8/6) secondary to altered mental status & becoming increasingly  lethargic. Within ED, labs significant for mild leukocytosis, hypernatremia (155), & acute kidney injury. CT head unremarkable for acute intracranial process though did have diffuse cerebral atrophy. He was given x1L LR. He was admitted to hospital medicine with continuation of IV fluids, electrolyte replacement, & dietary initiation with interval improvement of electrolyte derangements.     Of note patient has had multiple hospital presentations/admissions over the past few months, since April, 2024. Most of these presentations have been related primarily to fall/syncope. Most recently presented to ED (8/7/24) due to right forearm laceration that required suture repair. Per chart review, it appears prior to recent SNF placement, patient was living alone at home. During these prior presentations, per notes, it appears patient was able to converse in appropriate manner to explain symptoms and/or describe situation. He was also able to call ambulance for self previously.    During most recent hospitalization (7/2024); there were concerns for left frontal lobe acute to subacute lacunar infarct, chronic ischemic and atrophy per MRI imaging. Neurology was consulted & was concerned that the imaging may not have been the primary reason for patients progressive dementia-like symptoms w/ possibility being secondary to vascular dementia and/or other degenerative dementia.    In setting of above, palliative care has been consulted for goals of care.      Active Ambulatory Problems     Diagnosis Date Noted    No Active Ambulatory Problems     Resolved Ambulatory Problems     Diagnosis Date Noted    No Resolved Ambulatory Problems     No Additional Past Medical History        No past surgical history on file.     Review of patient's allergies indicates:  No Known Allergies       Current Facility-Administered Medications:     acetaminophen tablet 650 mg, 650 mg, Oral, Q4H PRN, Conchita Mckeon PA-C    aspirin chewable tablet 81  mg, 81 mg, Oral, Daily, Brittany Davis MD, 81 mg at 08/16/24 1004    atorvastatin tablet 20 mg, 20 mg, Oral, Daily, Brittany Davis MD, 20 mg at 08/16/24 1004    clopidogreL tablet 75 mg, 75 mg, Oral, Daily, Brittany Davis MD, 75 mg at 08/16/24 1004    dextrose 5 % with KCl 20 mEq infusion, , Intravenous, Continuous, Mame You, DO, Last Rate: 125 mL/hr at 08/16/24 0618, New Bag at 08/16/24 0618    enoxaparin injection 30 mg, 30 mg, Subcutaneous, Q24H (prophylaxis, 1700), Karley Garcia, FNP, 30 mg at 08/15/24 1708    famotidine tablet 40 mg, 40 mg, Oral, Daily, Brittany Davis MD, 40 mg at 08/16/24 1004    finasteride tablet 5 mg, 5 mg, Oral, Daily, Brittany Davis MD, 5 mg at 08/16/24 1004    hydrALAZINE injection 10 mg, 10 mg, Intravenous, Q4H PRN, Conchita Mckeon PA-C    ondansetron injection 4 mg, 4 mg, Intravenous, Q6H PRN, Conchita Mckeon PA-C       Current Facility-Administered Medications:     acetaminophen, 650 mg, Oral, Q4H PRN    hydrALAZINE, 10 mg, Intravenous, Q4H PRN    ondansetron, 4 mg, Intravenous, Q6H PRN     No family history on file.     Review of Systems   Unable to perform ROS: Acuity of condition (Unable to assess 2/2 current altered mental status/encephalopathy/dementia)            Objective:   /64   Pulse 99   Temp 98.3 °F (36.8 °C) (Oral)   Resp 18   Ht 6' (1.829 m)   Wt 73.3 kg (161 lb 9.6 oz)   SpO2 (!) 93%   BMI 21.92 kg/m²      Physical Exam  Constitutional:       General: He is not in acute distress.     Appearance: He is not toxic-appearing or diaphoretic.      Comments: Thin, frail, cachetic gentleman, lying supine in hospital bed on room air, in no acute distress   HENT:      Head: Normocephalic and atraumatic.   Eyes:      General: No scleral icterus.     Extraocular Movements: Extraocular movements intact.      Pupils: Pupils are equal, round, and reactive to light.   Cardiovascular:      Rate and Rhythm: Normal rate and regular rhythm.      Heart sounds:  Murmur (holosystolic murmur best appreciated at RUSB & LSB with radiation to B/L carotids (L>R)) heard.   Pulmonary:      Effort: No respiratory distress.      Breath sounds: No wheezing, rhonchi or rales.   Abdominal:      General: Abdomen is flat. There is no distension.      Comments: Hypoactive bowel sounds   Musculoskeletal:      Right lower leg: No edema.      Left lower leg: No edema.   Skin:     Coloration: Skin is pale.      Findings: Bruising (B/L UE) and lesion (LUE healing doral forearm lesion. RUE dorsal forearm recent laceration, with sutures & bandage in place, w/ mild surrounding erythema, w/o appreciable dehiscence/bleeding/oozing) present.   Neurological:      Mental Status: He is alert.      Comments: Difficulty to assess 2/2 patient's current altered mental status            Review of Symptoms  Review of Symptoms      Symptom Assessment (ESAS 0-10 Scale)  Unable to complete assessment due to Acuity of condition         Pain Assessment in Advanced Demential Scale (PAINAD)   Breathing - Independent of vocalization:  0 (unable to assess)  Negative vocalization:  0 (unable to assess)  Facial expression:  0 (unable to assess)  Body language:  0 (unable to assess)  Consolability:  0 (unable to assess)  Total:  0    Living Arrangement: Currently living at SNF prior to ED presentation.    Psychosocial/Cultural:   See Palliative Psychosocial Note: No  **Primary  to Follow**  Palliative Care  Consult: No      Advance Care Planning   Advance Directives:   Living Will: No        Oral Declaration: No    LaPOST: No    Do Not Resuscitate Status: Yes    Goals of Care: Will continue attempts to contact patient's family to initiate goals of care discussions            Caregiver burden formerly assessed: No        Osito Rodgers MD  Internal Medicine PGY-III  [facility]

## 2024-08-16 NOTE — PROGRESS NOTES
[facility] Hospital Medicine Progress Note        Chief Complaint: Inpatient Follow-up for     HPI:    89-year-old male with significant history of HTN, HLD, chronic kidney disease, advanced dementia with baseline encephalopathy, BPH, CAD on dual antiplatelets was sent to the ED from nursing home with altered mental status, increasing lethargic.  Patient is bed/chair bound at baseline.  He had a right forearm laceration which he sustained 2 days back when he was trying to get into the wheelchair and was caught, laceration was repaired.  Labs ordered given altered mental status/lethargic which showed abnormality and therefore was sent to the ED. hypotensive the ED, slightly improved with IV fluid resuscitation.  Lab significant for mild leukocytosis, free water deficit with sodium 155 and chloride 125, also noted renal insufficiency with creatinine-1.84.  Hospitalist team consulted for admission, he received 1 L Ringer lactate in the ED     Patient was started on D5 and improvement in hypernatremia    Interval Hx:   Patient seen and examined by bedside.  Continues to be intermittently confused per chart review that is usually his baseline;  Did eat his breakfast this morning; denied any pain anywhere    Case was discussed with patient's nurse and  on the floor.    Objective/physical exam:  General: In no acute distress, afebrile  Chest: Clear to auscultation bilaterally  Heart: RRR, +S1, S2, no appreciable murmur  Abdomen: Soft, nontender, BS +  MSK: Warm, no lower extremity edema, no clubbing or cyanosis  Neurologic: Alert and oriented x4, Cranial nerve II-XII intact, Strength 5/5 in all 4 extremities    VITAL SIGNS: 24 HRS MIN & MAX LAST   Temp  Min: 97.9 °F (36.6 °C)  Max: 98.7 °F (37.1 °C) 98.4 °F (36.9 °C)   BP  Min: 92/57  Max: 107/51 103/62   Pulse  Min: 89  Max: 106  89   Resp  Min: 18  Max: 20 20   SpO2  Min: 94 %  Max: 98 % (!) 94 %     I have reviewed the  following labs:  Recent Labs   Lab 08/14/24  0618 08/14/24  1257 08/15/24  0514   WBC 12.44* 12.62* 11.18   RBC 3.95* 3.95* 3.60*   HGB 12.8* 12.9* 11.7*   HCT 38.9* 37.9* 34.7*   MCV 98.5* 95.9* 96.4*   MCH 32.4* 32.7* 32.5*   MCHC 32.9* 34.0 33.7   RDW 14.8 15.0 14.7    261 223   MPV 10.6* 10.2 10.4     Recent Labs   Lab 08/14/24  1257 08/15/24  0514 08/16/24  0816   * 149* 142   K 3.8 2.9* 3.4*   * 119* 114*   CO2 20* 21* 19*   BUN 35.4* 27.2* 15.1   CREATININE 1.84* 1.46* 1.15   CALCIUM 9.9 9.4 8.7*   MG 2.60 2.20  --    ALBUMIN 3.1* 2.9* 2.7*   ALKPHOS 65 61 59   ALT 34 34 35   AST 62* 56* 58*   BILITOT 1.0 1.1 1.4     Microbiology Results (last 7 days)       ** No results found for the last 168 hours. **             See below for Radiology    Assessment/Plan:  Acute dehydration, improved  Severe free water deficit/acute hypernatremic hyperchloremia , resolved  ALFONSO on chronic kidney disease, stage III, resolved  Acute on chronic encephalopathy-metabolic secondary to free water deficit   History of essential HTN-now borderline hypotensive   History of CAD on dual antiplatelets   HLD   Advanced dementia   BPH   Prophylaxis     Hypernatremia resolved  Continue IV fluid  Hypokalemia improved, give another 20 mEq of potassium.  Potassium also included in fluids  Patient remains intermittently confused   Continue to hold all nephrotoxic agents   Renal ultrasound shows no hydronephrosis   ALFONSO resolved  CT head was negative   Leukocytosis resolved   Hold Flomax due to hypotension   Further recs based on clinical course   Patient has a advanced dementia, suspect patient may need some sort of feeding support in the future if continues to have poor intake  Patient DNR, we will consult palliative care for further goals of care  Labs in a.m.      VTE prophylaxis:  Lovenox    Patient condition:  Guarded    Anticipated discharge and Disposition:   Likely return back to nursing home      All diagnosis and  differential diagnosis have been reviewed; assessment and plan has been documented; I have personally reviewed the labs and test results that are presently available; I have reviewed the patients medication list; I have reviewed the consulting providers response and recommendations. I have reviewed or attempted to review medical records based upon their availability    All of the patient's questions have been  addressed and answered. Patient's is agreeable to the above stated plan. I will continue to monitor closely and make adjustments to medical management as needed.    Portions of this note dictated using EMR integrated voice recognition software, and may be subject to voice recognition errors not corrected at proofreading. Please contact writer for clarification if needed.   _____________________________________________________________________    Malnutrition Status:    Scheduled Med:   aspirin  81 mg Oral Daily    atorvastatin  20 mg Oral Daily    clopidogreL  75 mg Oral Daily    enoxparin  30 mg Subcutaneous Q24H (prophylaxis, 1700)    famotidine  40 mg Oral Daily    finasteride  5 mg Oral Daily    potassium bicarbonate  20 mEq Oral Once      Continuous Infusions:   dextrose 5 % with KCl 20 mEq   Intravenous Continuous 125 mL/hr at 08/16/24 0618 New Bag at 08/16/24 0618      PRN Meds:    Current Facility-Administered Medications:     acetaminophen, 650 mg, Oral, Q4H PRN    hydrALAZINE, 10 mg, Intravenous, Q4H PRN    ondansetron, 4 mg, Intravenous, Q6H PRN     Radiology:  I have personally reviewed the following imaging and agree with the radiologist.     US Retroperitoneal Complete  Narrative: EXAMINATION:  US RETROPERITONEAL COMPLETE    CLINICAL HISTORY:  lucy;    TECHNIQUE:  Ultrasound evaluation of the kidneys, region of the ureters, and urinary bladder.    COMPARISON:  No relevant comparison studies available at the time of dictation.    FINDINGS:  Left kidney obscured by bowel gas.  No right-sided  hydronephrosis.    Bladder not visualized, likely decompressed.    Abdominal aorta and IVC not directly imaged.    Measurements:    - Right kidney: 9.1 cm in length    - Left kidney: Not seen  Impression: Left kidney not visualized.  No right-sided hydronephrosis.    Electronically signed by: Farrukh Sinha  Date:    08/14/2024  Time:    18:09  X-Ray Chest 1 View  Narrative: EXAMINATION:  XR CHEST 1 VIEW    CLINICAL HISTORY:  LEUKOCYTOSIS;    TECHNIQUE:  One view    COMPARISON:  None available.    FINDINGS:  Cardiopericardial silhouette is within normal limits.  Sternotomy changes.  Suspect minimal left pleural effusion and left basilar atelectasis.  No consolidation, pulmonary edema or pneumothorax.  Impression: Suspect minimal left pleural effusion and basilar atelectasis.    Electronically signed by: Rocael Meyers  Date:    08/14/2024  Time:    14:21  CT Head Without Contrast  Narrative: EXAMINATION:  CT HEAD WITHOUT CONTRAST    CLINICAL HISTORY:  Mental status change, persistent or worsening;    TECHNIQUE:  Sequential axial images were performed of the brain without contrast.    Dose product length of 977 mGycm. Automated exposure control was utilized to minimize radiation dose.    COMPARISON:  None available.    FINDINGS:  There is no intracranial mass effect, midline shift, or hemorrhage.  There is ventriculomegaly which commensurate with the degree of atrophy.  There is no sulcal effacement or low attenuation changes to suggest recent large vessel territory infarction. Chronic appearing periventricular and subcortical white matter low attenuation changes are present and are consistent with chronic microangiopathic ischemia. There is no acute extra axial fluid collection. Visualized paranasal sinuses are clear without mucosal thickening, polypoidal abnormality or air-fluid levels. Mastoid air cells aeration is optimal.  Impression: 1.  No acute intracranial findings identified.    2.  Chronic microangiopathic  ischemia and atrophy.    Electronically signed by: Rocael Meyers  Date:    08/14/2024  Time:    14:18      Mame You DO  Department of Hospital Medicine  [facility]  08/16/2024

## 2024-08-16 NOTE — PT/OT/SLP EVAL
Physical Therapy Evaluation    Patient Name:  Nirmal Colorado   MRN:  [id number]    Recommendations:     Discharge therapy intensity: Moderate Intensity Therapy   Discharge Equipment Recommendations: to be determined by next level of care   Barriers to discharge:  medical dx, impaired mobility, decreased independence    Assessment:     Nirmal Colorado is a 89 y.o. male admitted with a medical diagnosis of R forearm laceration, AMS, lethargy, dehydration, acute encephalopathy 2/2 free water deficit. Hx of dementia.   He presents with the following impairments/functional limitations: impaired functional mobility, weakness, impaired cognition, decreased safety awareness, impaired endurance, gait instability, impaired self care skills, impaired balance, decreased upper extremity function, decreased lower extremity function.  Patient is extremely hard of hearing but tolerated PT eval fairly well otherwise. Delayed responses and performance to command. Patient unable to provide PLOF; however, 2 individuals entered the room during session-- stated they were employees of an assisted living facility who assessed pt prior to has last/recent hospitalization before he ended up at a SNF-- they reported that he was living independently and ambulating with use of RW. Today, pt requires maxA x2 for bed mobility and Kobi x2 for sit<>stand, unable to take steps. Increased VC/TC to complete tasks. Appears as if he would benefit from placement beyond this stay in order to maximize functional mobility and overall independence.     Rehab Prognosis: Fair; patient would benefit from acute skilled PT services to address these deficits and reach maximum level of function.    Recent Surgery: * No surgery found *      Plan:     During this hospitalization, patient would benefit from acute PT services 5 x/week to address the identified rehab impairments via gait training, therapeutic activities, therapeutic exercises and progress toward the following  goals:    Plan of Care Expires:  09/16/24    Subjective     Chief Complaint: n/a  Patient/Family Comments/goals: to get better  Pain/Comfort:  Pain Rating 1: 0/10    Patients cultural, spiritual, Yazidism conflicts given the current situation: no    Living Environment:  Pt was living alone in a [facility] however had a recent hospitalization and ended up at a SNF for about 8 days.  At home he was ambulating independently with use of RW.  At the SNF he would stand and transfer with 1 person assist in the beginning but then began to need 2 people. He would only transfer, not ambulate; was getting PT but, per RN whom I spoke with at facility, participation was limited   Equipment used at home: walker, rolling.    Upon discharge, patient will have assistance from staff.    Objective:     Communicated with NSG prior to session.  Patient found HOB elevated with peripheral IV (TOBI jay)  upon PT entry to room.    General Precautions: Standard, fall, hearing impaired  Orthopedic Precautions:N/A   Braces: N/A  Respiratory Status: Room air      Exams:  Cognitive Exam:  Patient is not able to answer any orientation questions; when called by name pt does look, attend and respond  LLE Strength: unable to kick out against gravity, did wiggle toes and move ankle  RLE Strength: grossly 3-/5  Limited ability to formally assess 2/2 impaired cognition and [place]  Skin integrity:  small sacral skin tear- wound care on board; small laceration open on L UE      Functional Mobility:  Bed Mobility:     Supine to Sit: maximal assistance and of 2 persons  Sit to Supine: maximal assistance and of 2 persons  Transfers:     Sit to/from Stand:  minimum assistance and of 2 persons with rolling walker  X2 trials performed  VC/TC for full hip extension and erect trunk  During first stand, pt began to have a BM; rashel care performed  On second stand, instructed pt to attempt lateral steps; pt unable to even lift either LE off of the floor nor shuffle  therefore unsuccessful  Balance: Kobi for static sitting balance; kyphotic trunk with excessive cervical flexion; mild L lateral lean       Treatment & Education:  Patient provided with verbal education education regarding PT role/goals/POC, fall prevention, safety awareness, discharge/DME recommendations, and pressure ulcer prevention.  Additional teaching is warranted.     Patient left HOB elevated with all lines intact, call button in reach, wedge under R side, pressure relief boots, bed alarm on, RN notified, and TOBI zuletas donned .    GOALS:   Multidisciplinary Problems       Physical Therapy Goals          Problem: Physical Therapy    Goal Priority Disciplines Outcome Goal Variances Interventions   Physical Therapy Goal     PT, PT/OT Progressing     Description: Goals to be met by: 24     Patient will increase functional independence with mobility by performin. Supine to sit with MInimal Assistance  2. Sit to supine with MInimal Assistance  3. Sit to stand transfer with Minimal Assistance  4. Bed to chair transfer with Minimal Assistance using Rolling Walker  5. Sitting at edge of bed x20 minutes with Stand-by Assistance                         History:     No past medical history on file.    No past surgical history on file.    Time Tracking:     PT Received On: 24  PT Start Time: 1426     PT Stop Time: 1454  PT Total Time (min): 28 min     Billable Minutes: Evaluation mod      2024

## 2024-08-16 NOTE — PROGRESS NOTES
[facility] 4th Floor Medical Telemetry  Wound Care    Patient Name:  Nirmal Colorado   MRN:  [id number]  Date: 8/16/2024  Diagnosis: <principal problem not specified>    History:     No past medical history on file.    Social History     Socioeconomic History    Marital status: Single       Precautions:     Allergies as of 08/14/2024    (No Known Allergies)       WO Assessment Details/Treatment      08/16/24 1047   WOCN Assessment   Visit Date 08/16/24   Visit Time 1047   Consult Type New   WO Speciality Wound   Intervention chart review;assessed;applied;orders   Teaching on-going        Wound 08/16/24 Pressure Injury Left Gluteal cleft   Date First Assessed: 08/16/24   Present on Original Admission: Yes  Primary Wound Type: Pressure Injury  Side: Left  Location: Gluteal cleft   Wound Image    Dressing Appearance Open to air   Drainage Amount None   Drainage Characteristics/Odor No odor   Appearance Red;Moist   Tissue loss description Partial thickness   Black (%), Wound Tissue Color 0 %   Red (%), Wound Tissue Color 100 %   Yellow (%), Wound Tissue Color 0 %   Periwound Area Intact;Dry;Scar tissue;Pink   Wound Edges Defined   Wound Length (cm) 2.6 cm   Wound Width (cm) 1.2 cm   Wound Depth (cm) 0.1 cm   Wound Volume (cm^3) 0.312 cm^3   Wound Surface Area (cm^2) 3.12 cm^2   Care Cleansed with:;Soap and water   Dressing Applied;Foam     [facility] new consulted for sacrum. Discussed plan of care with nurse Pascal prior to visiting the patient. Introduced self and explained reason for visit, patient agreeable to be seen. Treatment recommendations put in place. Butt: Cleanse with soap and water, dry well. Apply foam, change every 2 days. Nursing to continue with treatment recommendations and other preventative measures. Education done with staff, patient unable to be taught. Answered all questions to the satisfaction of the patient and staff. Will follow up  08/16/2024

## 2024-08-16 NOTE — PLAN OF CARE
Spoke to family member Alaina at the bedside. She is requesting another snf upon discharge. Patient at [facility] for 8 days prior to readmission to the hospital. Patient was here prior to admission to snf and the facilities she wanted did not have beds. I did make her aware that there may be difficulty with getting him to another snf so keep his bed at [facility] because he will most likely have to return. I also informed her that I could work on placement while he was here but once medically stable for discharge would most likely have to return to [facility]. Referral sent to requested facilities [facility], [facility], and [facility]. She is also meeting with Manuela at [facility] for admission into there memory care unit. He was scheduled for admission their prior to his recent hospital stay. I did make her aware he may no longer be appropriate for this type of facility at his time. She will let me know the result of the conversation she has with Kleber. Discussed with Dr You. PT ordered. Palliative Care also consulted.

## 2024-08-16 NOTE — NURSING
Nurses Note -- 4 Eyes      8/16/2024   12:33 AM      Skin assessed during: Q Shift Change      [] No Altered Skin Integrity Present    []Prevention Measures Documented      [x] Yes- Altered Skin Integrity Present or Discovered   [] LDA Added if Not in Epic (Describe Wound)   [] New Altered Skin Integrity was Present on Admit and Documented in LDA   [] Wound Image Taken    Wound Care Consulted? No    Attending Nurse:  Loren Suh RN/Staff Member:   Sara

## 2024-08-16 NOTE — NURSING
Nurses Note -- 4 Eyes      8/16/2024   0700 AM      Skin assessed during: Q Shift Change      [] No Altered Skin Integrity Present    []Prevention Measures Documented      [x] Yes- Altered Skin Integrity Present or Discovered   [] LDA Added if Not in Epic (Describe Wound)   [] New Altered Skin Integrity was Present on Admit and Documented in LDA   [] Wound Image Taken    Wound Care Consulted? Yes    Attending Nurse:  Naida Suh RN/Staff Member:   Loren

## 2024-08-16 NOTE — PLAN OF CARE
Problem: Adult Inpatient Plan of Care  Goal: Absence of Hospital-Acquired Illness or Injury  Outcome: Progressing  Goal: Optimal Comfort and Wellbeing  Outcome: Progressing     Problem: Fall Injury Risk  Goal: Absence of Fall and Fall-Related Injury  Outcome: Progressing     Problem: Wound  Goal: Absence of Infection Signs and Symptoms  Outcome: Progressing  Goal: Optimal Pain Control and Function  Outcome: Progressing  Goal: Optimal Wound Healing  Outcome: Progressing

## 2024-08-16 NOTE — PLAN OF CARE
Problem: Adult Inpatient Plan of Care  Goal: Plan of Care Review  Outcome: Progressing  Goal: Patient-Specific Goal (Individualized)  Outcome: Progressing  Goal: Optimal Comfort and Wellbeing  Outcome: Progressing  Goal: Readiness for Transition of Care  Outcome: Progressing     Problem: Fall Injury Risk  Goal: Absence of Fall and Fall-Related Injury  Outcome: Progressing

## 2024-08-17 LAB
ANION GAP SERPL CALC-SCNC: 9 MEQ/L
BUN SERPL-MCNC: 18.1 MG/DL (ref 8.4–25.7)
CALCIUM SERPL-MCNC: 9.1 MG/DL (ref 8.8–10)
CHLORIDE SERPL-SCNC: 114 MMOL/L (ref 98–107)
CO2 SERPL-SCNC: 21 MMOL/L (ref 23–31)
CREAT SERPL-MCNC: 1.08 MG/DL (ref 0.73–1.18)
CREAT/UREA NIT SERPL: 17
ERYTHROCYTE [DISTWIDTH] IN BLOOD BY AUTOMATED COUNT: 14.4 % (ref 11.5–17)
GFR SERPLBLD CREATININE-BSD FMLA CKD-EPI: >60 ML/MIN/1.73/M2
GLUCOSE SERPL-MCNC: 102 MG/DL (ref 82–115)
HCT VFR BLD AUTO: 37.7 % (ref 42–52)
HGB BLD-MCNC: 12.8 G/DL (ref 14–18)
MCH RBC QN AUTO: 32.7 PG (ref 27–31)
MCHC RBC AUTO-ENTMCNC: 34 G/DL (ref 33–36)
MCV RBC AUTO: 96.2 FL (ref 80–94)
NRBC BLD AUTO-RTO: 0 %
PLATELET # BLD AUTO: 171 X10(3)/MCL (ref 130–400)
PMV BLD AUTO: 10.4 FL (ref 7.4–10.4)
POTASSIUM SERPL-SCNC: 3.6 MMOL/L (ref 3.5–5.1)
RBC # BLD AUTO: 3.92 X10(6)/MCL (ref 4.7–6.1)
SODIUM SERPL-SCNC: 144 MMOL/L (ref 136–145)
WBC # BLD AUTO: 10.84 X10(3)/MCL (ref 4.5–11.5)

## 2024-08-17 PROCEDURE — 25000003 PHARM REV CODE 250: Performed by: INTERNAL MEDICINE

## 2024-08-17 PROCEDURE — 25000003 PHARM REV CODE 250: Performed by: STUDENT IN AN ORGANIZED HEALTH CARE EDUCATION/TRAINING PROGRAM

## 2024-08-17 PROCEDURE — 36415 COLL VENOUS BLD VENIPUNCTURE: CPT | Performed by: STUDENT IN AN ORGANIZED HEALTH CARE EDUCATION/TRAINING PROGRAM

## 2024-08-17 PROCEDURE — 63600175 PHARM REV CODE 636 W HCPCS: Performed by: NURSE PRACTITIONER

## 2024-08-17 PROCEDURE — 21400001 HC TELEMETRY ROOM

## 2024-08-17 PROCEDURE — 63600175 PHARM REV CODE 636 W HCPCS: Performed by: STUDENT IN AN ORGANIZED HEALTH CARE EDUCATION/TRAINING PROGRAM

## 2024-08-17 PROCEDURE — 80048 BASIC METABOLIC PNL TOTAL CA: CPT | Performed by: STUDENT IN AN ORGANIZED HEALTH CARE EDUCATION/TRAINING PROGRAM

## 2024-08-17 PROCEDURE — 85027 COMPLETE CBC AUTOMATED: CPT | Performed by: STUDENT IN AN ORGANIZED HEALTH CARE EDUCATION/TRAINING PROGRAM

## 2024-08-17 PROCEDURE — A4216 STERILE WATER/SALINE, 10 ML: HCPCS | Performed by: STUDENT IN AN ORGANIZED HEALTH CARE EDUCATION/TRAINING PROGRAM

## 2024-08-17 RX ADMIN — Medication 10 ML: at 05:08

## 2024-08-17 RX ADMIN — ASPIRIN 81 MG CHEWABLE TABLET 81 MG: 81 TABLET CHEWABLE at 09:08

## 2024-08-17 RX ADMIN — Medication 10 ML: at 02:08

## 2024-08-17 RX ADMIN — CLOPIDOGREL BISULFATE 75 MG: 75 TABLET ORAL at 09:08

## 2024-08-17 RX ADMIN — FAMOTIDINE 40 MG: 20 TABLET, FILM COATED ORAL at 09:08

## 2024-08-17 RX ADMIN — FINASTERIDE 5 MG: 5 TABLET, FILM COATED ORAL at 09:08

## 2024-08-17 RX ADMIN — POTASSIUM CHLORIDE AND DEXTROSE MONOHYDRATE: 150; 5 INJECTION, SOLUTION INTRAVENOUS at 04:08

## 2024-08-17 RX ADMIN — ATORVASTATIN CALCIUM 20 MG: 10 TABLET, FILM COATED ORAL at 09:08

## 2024-08-17 RX ADMIN — ENOXAPARIN SODIUM 30 MG: 40 INJECTION SUBCUTANEOUS at 04:08

## 2024-08-17 NOTE — NURSING
Nurses Note -- 4 Eyes      8/17/2024   7:24 AM      Skin assessed during: Q Shift Change      [] No Altered Skin Integrity Present    []Prevention Measures Documented      [x] Yes- Altered Skin Integrity Present or Discovered   [] LDA Added if Not in Epic (Describe Wound)   [] New Altered Skin Integrity was Present on Admit and Documented in LDA   [] Wound Image Taken    Wound Care Consulted? No    Attending Nurse:  Keily Wang RN    Second RN/Staff Member:  Jonny Crawford RN

## 2024-08-17 NOTE — PROCEDURES
Nirmal Colorado is a 89 y.o. male patient.    Temp: 98 °F (36.7 °C) (08/16/24 1942)  Pulse: 108 (08/16/24 1942)  Resp: 18 (08/16/24 1942)  BP: 109/60 (08/16/24 1942)  SpO2: 98 % (08/16/24 1942)  Weight: 73.3 kg (161 lb 9.6 oz) (08/15/24 1241)  Height: 6' (182.9 cm) (08/15/24 1241)    PICC  Date/Time: 8/16/2024 10:51 PM  Performed by: Dread Humphreys RN  Consent Done: Yes  Time out: Immediately prior to procedure a time out was called to verify the correct patient, procedure, equipment, support staff and site/side marked as required  Indications: med administration and vascular access  Anesthesia: local infiltration  Local anesthetic: lidocaine 1% without epinephrine  Anesthetic Total (mL): 5  Preparation: skin prepped with ChloraPrep  Skin prep agent dried: skin prep agent completely dried prior to procedure  Sterile barriers: all five maximum sterile barriers used - cap, mask, sterile gown, sterile gloves, and large sterile sheet  Hand hygiene: hand hygiene performed prior to central venous catheter insertion  Location details: left basilic  Catheter type: single lumen  Catheter size: 4 Fr  Catheter Length: 17cm    Ultrasound guidance: yes  Vessel Caliber: medium, compressibility normal  Needle advanced into vessel with real time Ultrasound guidance.  Guidewire confirmed in vessel.  Sterile sheath used.  Number of attempts: 1  Post-procedure: blood return through all ports, chlorhexidine patch and sterile dressing applied            Name Dread Humphreys RN  8/16/2024

## 2024-08-17 NOTE — PLAN OF CARE
Problem: Adult Inpatient Plan of Care  Goal: Plan of Care Review  Outcome: Progressing  Goal: Patient-Specific Goal (Individualized)  Outcome: Progressing  Goal: Absence of Hospital-Acquired Illness or Injury  Outcome: Progressing  Goal: Optimal Comfort and Wellbeing  Outcome: Progressing  Goal: Readiness for Transition of Care  Outcome: Progressing     Problem: Fall Injury Risk  Goal: Absence of Fall and Fall-Related Injury  Outcome: Progressing     Problem: Skin Injury Risk Increased  Goal: Skin Health and Integrity  Outcome: Progressing     Problem: Wound  Goal: Optimal Coping  Outcome: Progressing  Goal: Absence of Infection Signs and Symptoms  Outcome: Progressing  Goal: Improved Oral Intake  Outcome: Progressing  Goal: Optimal Pain Control and Function  Outcome: Progressing  Goal: Skin Health and Integrity  Outcome: Progressing  Goal: Optimal Wound Healing  Outcome: Progressing     Problem: Infection  Goal: Absence of Infection Signs and Symptoms  Outcome: Progressing     Problem: Coping Ineffective  Goal: Effective Coping  Outcome: Met     Problem: Wound  Goal: Optimal Functional Ability  Outcome: Met

## 2024-08-17 NOTE — PROGRESS NOTES
[facility] Hospital Medicine Progress Note        Chief Complaint: Inpatient Follow-up for     HPI:    89-year-old male with significant history of HTN, HLD, chronic kidney disease, advanced dementia with baseline encephalopathy, BPH, CAD on dual antiplatelets was sent to the ED from nursing home with altered mental status, increasing lethargic.  Patient is bed/chair bound at baseline.  He had a right forearm laceration which he sustained 2 days back when he was trying to get into the wheelchair and was caught, laceration was repaired.  Labs ordered given altered mental status/lethargic which showed abnormality and therefore was sent to the ED. hypotensive the ED, slightly improved with IV fluid resuscitation.  Lab significant for mild leukocytosis, free water deficit with sodium 155 and chloride 125, also noted renal insufficiency with creatinine-1.84.  Hospitalist team consulted for admission, he received 1 L Ringer lactate in the ED     Patient was started on D5 and improvement in hypernatremia    Interval Hx:   Patient seen and examined by bedside.  Did eat most of his breakfast this morning.  Continues to remain confused wishes largely his baseline    Case was discussed with patient's nurse and  on the floor.    Objective/physical exam:  General: In no acute distress, afebrile  Chest: Clear to auscultation bilaterally  Heart: RRR, +S1, S2, no appreciable murmur  Abdomen: Soft, nontender, BS +  MSK: Warm, no lower extremity edema, no clubbing or cyanosis  Neurologic confused    VITAL SIGNS: 24 HRS MIN & MAX LAST   Temp  Min: 98 °F (36.7 °C)  Max: 98.9 °F (37.2 °C) 98.1 °F (36.7 °C)   BP  Min: 100/59  Max: 123/81 123/81   Pulse  Min: 95  Max: 114  103   Resp  Min: 18  Max: 20 18   SpO2  Min: 93 %  Max: 98 % 98 %     I have reviewed the following labs:  Recent Labs   Lab 08/14/24  1257 08/15/24  0514 08/17/24  0238   WBC 12.62* 11.18 10.84   RBC 3.95* 3.60* 3.92*   HGB  12.9* 11.7* 12.8*   HCT 37.9* 34.7* 37.7*   MCV 95.9* 96.4* 96.2*   MCH 32.7* 32.5* 32.7*   MCHC 34.0 33.7 34.0   RDW 15.0 14.7 14.4    223 171   MPV 10.2 10.4 10.4     Recent Labs   Lab 08/14/24  1257 08/15/24  0514 08/16/24  0816 08/17/24  0238   * 149* 142 144   K 3.8 2.9* 3.4* 3.6   * 119* 114* 114*   CO2 20* 21* 19* 21*   BUN 35.4* 27.2* 15.1 18.1   CREATININE 1.84* 1.46* 1.15 1.08   CALCIUM 9.9 9.4 8.7* 9.1   MG 2.60 2.20  --   --    ALBUMIN 3.1* 2.9* 2.7*  --    ALKPHOS 65 61 59  --    ALT 34 34 35  --    AST 62* 56* 58*  --    BILITOT 1.0 1.1 1.4  --      Microbiology Results (last 7 days)       ** No results found for the last 168 hours. **             See below for Radiology    Assessment/Plan:  Acute dehydration, resolved  Severe free water deficit/acute hypernatremic hyperchloremia , resolved  ALFONSO on chronic kidney disease, stage III, resolved  Acute on chronic encephalopathy-metabolic secondary to free water deficit   History of essential HTN-now borderline hypotensive   History of CAD on dual antiplatelets   HLD   Advanced dementia   BPH   Prophylaxis     Hypernatremia resolved  Continue IV fluid a decreasing rate of 75 cc/hour, can likely DC fluids tomorrow  Hypokalemia resolved  Patient remains intermittently confused, which is likely his baseline  Continue to hold all nephrotoxic agents   Renal ultrasound shows no hydronephrosis   ALFONSO resolved  CT head was negative   Leukocytosis resolved   Hold Flomax due to hypotension   Further recs based on clinical course   Patient has a advanced dementia, suspect patient may need some sort of feeding support in the future if continues to have poor intake  Patient DNR, we will consult palliative care for further goals of care  Labs in a.m.      VTE prophylaxis:  Lovenox    Patient condition:  Guarded    Anticipated discharge and Disposition:   Likely return back to nursing home vs memory Care unit      All diagnosis and differential diagnosis  have been reviewed; assessment and plan has been documented; I have personally reviewed the labs and test results that are presently available; I have reviewed the patients medication list; I have reviewed the consulting providers response and recommendations. I have reviewed or attempted to review medical records based upon their availability    All of the patient's questions have been  addressed and answered. Patient's is agreeable to the above stated plan. I will continue to monitor closely and make adjustments to medical management as needed.    Portions of this note dictated using EMR integrated voice recognition software, and may be subject to voice recognition errors not corrected at proofreading. Please contact writer for clarification if needed.   _____________________________________________________________________    Malnutrition Status:    Scheduled Med:   aspirin  81 mg Oral Daily    atorvastatin  20 mg Oral Daily    clopidogreL  75 mg Oral Daily    enoxparin  30 mg Subcutaneous Q24H (prophylaxis, 1700)    famotidine  40 mg Oral Daily    finasteride  5 mg Oral Daily    sodium chloride 0.9%  10 mL Intravenous Q6H      Continuous Infusions:   dextrose 5 % with KCl 20 mEq   Intravenous Continuous 75 mL/hr at 08/17/24 0922 Rate Change at 08/17/24 0922      PRN Meds:    Current Facility-Administered Medications:     acetaminophen, 650 mg, Oral, Q4H PRN    hydrALAZINE, 10 mg, Intravenous, Q4H PRN    ondansetron, 4 mg, Intravenous, Q6H PRN    Flushing PICC/Midline Protocol, , , Until Discontinued **AND** sodium chloride 0.9%, 10 mL, Intravenous, Q6H **AND** sodium chloride 0.9%, 10 mL, Intravenous, PRN     Radiology:  I have personally reviewed the following imaging and agree with the radiologist.     CT Cervical Spine Without Contrast  Narrative: EXAMINATION:  CT CERVICAL SPINE WITHOUT CONTRAST    CLINICAL HISTORY:  Neck trauma.    TECHNIQUE:  Low dose axial images, sagittal and coronal reformations were  performed though the cervical spine. Contrast was not administered. Dose reduction techniques including automatic exposure control (AEC) were utilized.    Dose (DLP): 1281 mGycm (2 studies)    COMPARISON:  None    FINDINGS:  Trace retrolisthesis of C4 on C5. Lateral masses of C1 and C2 are congruent.    Subtle chronic superior C7 and T1 vertebral endplate compression fractures which are favored chronic.  No significant height loss.  Vertebral body heights are maintained.    Multilevel spondylosis, greatest at C3-4 where there is moderate AP narrowing the spinal canal.  Moderate-severe right foraminal stenosis at and minimal left foraminal stenosis at C3-4.  Moderate-severe right and moderate left foraminal stenosis at C4-5.  Mild right foraminal stenosis at C5-6.    Paravertebral soft tissues are normal.    Bilateral carotid bifurcation calcifications.  Impression: Nighthawk concordant.  No acute fracture or traumatic malalignment of the cervical spine.    Electronically signed by: Mark Anthony Churchill MD  Date:    08/17/2024  Time:    08:40  CT Head Without Contrast  Narrative: EXAMINATION:  CT HEAD WITHOUT CONTRAST    CLINICAL HISTORY:  Head trauma, minor.  Abnormal labs.    TECHNIQUE:  Low dose axial images were obtained through the head.  Coronal and sagittal reformations were also performed. Contrast was not administered.  Dose reduction techniques including automatic exposure control (AEC) were utilized.    Dose (DLP): 1281 mGycm (2 studies)    COMPARISON:  CT head without contrast, 08/14/2024.    FINDINGS:  INTRACRANIAL: Stable global volume loss.  Scattered white matter hypodensities, likely related to chronic microvascular ischemic change.  Gray-white differentiation is preserved.  No acute intracranial hemorrhage.  No hydrocephalus.  No intracranial mass effect.    SINUSES: Moderate left sphenoid sinus mucosal thickening with frothy opacity, increased compared to prior study from 08/14/2024..  Trace  inspissated mucus in the right dorsal ethmoid air cells.  Minimal bilateral ethmoid sinus mucosal thickening.  Mastoid air cells are clear.    SKULL/SCALP: No acute calvarial abnormality.  Incidental persistent metopic suture.  Opacities in the external auditory canals, likely cerumen.    ORBITS: Bilateral lens replacements.  Impression: Nighthawk concordant.  No acute intracranial abnormality.    Electronically signed by: Mark Anthony Churchill MD  Date:    08/17/2024  Time:    08:36      Mame You DO  Department of Hospital Medicine  [facility]  08/17/2024

## 2024-08-17 NOTE — NURSING
Nurses Note -- 4 Eyes      8/16/24 2000      Skin assessed during: Q Shift Change      [] No Altered Skin Integrity Present    [x]Prevention Measures Documented      [x] Yes- Altered Skin Integrity Present or Discovered   [] LDA Added if Not in Epic (Describe Wound)   [] New Altered Skin Integrity was Present on Admit and Documented in LDA   [] Wound Image Taken    Wound Care Consulted? Yes    Attending Nurse:  Jonny Suh RN/Staff Member:   Naida

## 2024-08-18 PROCEDURE — 25000003 PHARM REV CODE 250: Performed by: INTERNAL MEDICINE

## 2024-08-18 PROCEDURE — A4216 STERILE WATER/SALINE, 10 ML: HCPCS | Performed by: STUDENT IN AN ORGANIZED HEALTH CARE EDUCATION/TRAINING PROGRAM

## 2024-08-18 PROCEDURE — 21400001 HC TELEMETRY ROOM

## 2024-08-18 PROCEDURE — 63600175 PHARM REV CODE 636 W HCPCS: Performed by: NURSE PRACTITIONER

## 2024-08-18 PROCEDURE — 63600175 PHARM REV CODE 636 W HCPCS: Performed by: STUDENT IN AN ORGANIZED HEALTH CARE EDUCATION/TRAINING PROGRAM

## 2024-08-18 PROCEDURE — 97530 THERAPEUTIC ACTIVITIES: CPT | Mod: CQ

## 2024-08-18 PROCEDURE — 25000003 PHARM REV CODE 250: Performed by: STUDENT IN AN ORGANIZED HEALTH CARE EDUCATION/TRAINING PROGRAM

## 2024-08-18 RX ADMIN — CLOPIDOGREL BISULFATE 75 MG: 75 TABLET ORAL at 08:08

## 2024-08-18 RX ADMIN — ASPIRIN 81 MG CHEWABLE TABLET 81 MG: 81 TABLET CHEWABLE at 08:08

## 2024-08-18 RX ADMIN — Medication 10 ML: at 05:08

## 2024-08-18 RX ADMIN — POTASSIUM CHLORIDE AND DEXTROSE MONOHYDRATE: 150; 5 INJECTION, SOLUTION INTRAVENOUS at 06:08

## 2024-08-18 RX ADMIN — Medication 10 ML: at 06:08

## 2024-08-18 RX ADMIN — Medication 10 ML: at 02:08

## 2024-08-18 RX ADMIN — Medication 10 ML: at 12:08

## 2024-08-18 RX ADMIN — FAMOTIDINE 40 MG: 20 TABLET, FILM COATED ORAL at 08:08

## 2024-08-18 RX ADMIN — ENOXAPARIN SODIUM 30 MG: 40 INJECTION SUBCUTANEOUS at 04:08

## 2024-08-18 RX ADMIN — FINASTERIDE 5 MG: 5 TABLET, FILM COATED ORAL at 08:08

## 2024-08-18 RX ADMIN — ATORVASTATIN CALCIUM 20 MG: 10 TABLET, FILM COATED ORAL at 08:08

## 2024-08-18 NOTE — PLAN OF CARE
Problem: Adult Inpatient Plan of Care  Goal: Plan of Care Review  Outcome: Progressing  Goal: Patient-Specific Goal (Individualized)  Outcome: Progressing  Goal: Absence of Hospital-Acquired Illness or Injury  Outcome: Progressing  Goal: Optimal Comfort and Wellbeing  Outcome: Progressing  Goal: Readiness for Transition of Care  Outcome: Progressing     Problem: Fall Injury Risk  Goal: Absence of Fall and Fall-Related Injury  Outcome: Progressing     Problem: Skin Injury Risk Increased  Goal: Skin Health and Integrity  Outcome: Progressing     Problem: Wound  Goal: Absence of Infection Signs and Symptoms  Outcome: Progressing  Goal: Skin Health and Integrity  Outcome: Progressing  Goal: Optimal Wound Healing  Outcome: Progressing     Problem: Infection  Goal: Absence of Infection Signs and Symptoms  Outcome: Progressing     Problem: Wound  Goal: Optimal Coping  Outcome: Met  Goal: Improved Oral Intake  Outcome: Met  Goal: Optimal Pain Control and Function  Outcome: Met

## 2024-08-18 NOTE — PT/OT/SLP PROGRESS
Physical Therapy Treatment    Patient Name:  Nirmal Colorado   MRN:  [id number]    Recommendations:     Discharge therapy intensity: Moderate Intensity Therapy   Discharge Equipment Recommendations: to be determined by next level of care  Barriers to discharge: Impaired mobility and Ongoing medical needs    Assessment:     Nirmal Colorado is a 89 y.o. male admitted with a medical diagnosis of R forearm laceration, AMS, lethargy, dehydration, acute encephalopathy 2/2 free water deficit. Hx of dementia.  He presents with the following impairments/functional limitations: impaired functional mobility, weakness, impaired cognition, decreased safety awareness, impaired endurance, gait instability, impaired self care skills, impaired balance, decreased upper extremity function, decreased lower extremity function.    Rehab Prognosis: Good; patient would benefit from acute skilled PT services to address these deficits and reach maximum level of function.    Recent Surgery: * No surgery found *      Plan:     During this hospitalization, patient would benefit from acute PT services 5 x/week to address the identified rehab impairments via gait training, therapeutic activities, therapeutic exercises and progress toward the following goals:    Plan of Care Expires:  09/16/24    Subjective     Chief Complaint: none stated  Patient/Family Comments/goals: none stated  Pain/Comfort:         Objective:     Communicated with nursing prior to session.  Patient found supine with peripheral IV, telemetry, bed alarm, roll belt upon PT entry to room.     General Precautions: Standard, fall, hearing impaired  Orthopedic Precautions: N/A  Braces: N/A  Respiratory Status: Room air  Blood Pressure: NT    Functional Mobility:  Bed Mobility:     Rolling Left:  minimum assistance  Rolling Right: minimum assistance  Supine to Sit: moderate assistance  Sit to Supine: moderate assistance  Transfers:     Sit to Stand:  minimum assistance, moderate assistance,  and of 2 persons with rolling walker  Pt only able to stand 10-20 secs  Gait: steps along EOB, w/RW, ModA    Therapeutic Activities/Exercises:  Upon entry pt was soiled. Rolling was performed for doffing brief, chux/gown change, & total A pericare. Once sitting EOB pt able to performed x3 sit to stands. On 3rd stand pt took step toward HOB then pt w/(+) BM. Patient was laid back down for total A pericare.     Education:  Patient provided with verbal education education regarding PT role/goals/POC, fall prevention, and safety awareness.  Understanding was verbalized, however additional teaching warranted.     Patient left right sidelying with all lines intact, call button in reach, wedge under L side, bed alarm on, nurse notified, and roll belt donned    GOALS:   Multidisciplinary Problems       Physical Therapy Goals          Problem: Physical Therapy    Goal Priority Disciplines Outcome Goal Variances Interventions   Physical Therapy Goal     PT, PT/OT Progressing     Description: Goals to be met by: 24     Patient will increase functional independence with mobility by performin. Supine to sit with MInimal Assistance  2. Sit to supine with MInimal Assistance  3. Sit to stand transfer with Minimal Assistance  4. Bed to chair transfer with Minimal Assistance using Rolling Walker  5. Sitting at edge of bed x20 minutes with Stand-by Assistance                         Time Tracking:     PT Received On: 24  PT Start Time: 838     PT Stop Time: 908  PT Total Time (min): 30 min     Billable Minutes: Therapeutic Activity 30    Treatment Type: Treatment  PT/PTA: PTA     Number of PTA visits since last PT visit: 1     2024

## 2024-08-18 NOTE — PLAN OF CARE
Problem: Adult Inpatient Plan of Care  Goal: Plan of Care Review  Outcome: Progressing  Goal: Patient-Specific Goal (Individualized)  Outcome: Progressing  Goal: Absence of Hospital-Acquired Illness or Injury  Outcome: Progressing  Goal: Optimal Comfort and Wellbeing  Outcome: Progressing  Goal: Readiness for Transition of Care  Outcome: Progressing     Problem: Fall Injury Risk  Goal: Absence of Fall and Fall-Related Injury  Outcome: Progressing     Problem: Wound  Goal: Optimal Coping  Outcome: Progressing  Goal: Absence of Infection Signs and Symptoms  Outcome: Progressing  Goal: Improved Oral Intake  Outcome: Progressing  Goal: Optimal Pain Control and Function  Outcome: Progressing  Goal: Skin Health and Integrity  Outcome: Progressing  Goal: Optimal Wound Healing  Outcome: Progressing

## 2024-08-18 NOTE — PROGRESS NOTES
[facility] Hospital Medicine Progress Note        Chief Complaint: Inpatient Follow-up for     HPI:    89-year-old male with significant history of HTN, HLD, chronic kidney disease, advanced dementia with baseline encephalopathy, BPH, CAD on dual antiplatelets was sent to the ED from nursing home with altered mental status, increasing lethargic.  Patient is bed/chair bound at baseline.  He had a right forearm laceration which he sustained 2 days back when he was trying to get into the wheelchair and was caught, laceration was repaired.  Labs ordered given altered mental status/lethargic which showed abnormality and therefore was sent to the ED. hypotensive the ED, slightly improved with IV fluid resuscitation.  Lab significant for mild leukocytosis, free water deficit with sodium 155 and chloride 125, also noted renal insufficiency with creatinine-1.84.  Hospitalist team consulted for admission, he received 1 L Ringer lactate in the ED     Patient was started on D5 and improvement in hypernatremia.  Hypernatremia resolved.  Patient continues to remain confused which was largely his baseline.    Interval Hx:   Patient seen and examined by bedside.  No acute overnight events.  No changes in mentation.    Case was discussed with patient's nurse and  on the floor.    Objective/physical exam:  General: In no acute distress, afebrile  Chest: Clear to auscultation bilaterally  Heart: RRR, +S1, S2, no appreciable murmur  Abdomen: Soft, nontender, BS +  MSK: Warm, no lower extremity edema, no clubbing or cyanosis  Neurologic confused    VITAL SIGNS: 24 HRS MIN & MAX LAST   Temp  Min: 97.8 °F (36.6 °C)  Max: 98.6 °F (37 °C) 98.6 °F (37 °C)   BP  Min: 103/67  Max: 134/73 103/67   Pulse  Min: 99  Max: 116  99   Resp  Min: 18  Max: 18 18   SpO2  Min: 96 %  Max: 99 % 97 %     I have reviewed the following labs:  Recent Labs   Lab 08/14/24  1257 08/15/24  0514 08/17/24  0238   WBC 12.62*  11.18 10.84   RBC 3.95* 3.60* 3.92*   HGB 12.9* 11.7* 12.8*   HCT 37.9* 34.7* 37.7*   MCV 95.9* 96.4* 96.2*   MCH 32.7* 32.5* 32.7*   MCHC 34.0 33.7 34.0   RDW 15.0 14.7 14.4    223 171   MPV 10.2 10.4 10.4     Recent Labs   Lab 08/14/24  1257 08/15/24  0514 08/16/24  0816 08/17/24  0238   * 149* 142 144   K 3.8 2.9* 3.4* 3.6   * 119* 114* 114*   CO2 20* 21* 19* 21*   BUN 35.4* 27.2* 15.1 18.1   CREATININE 1.84* 1.46* 1.15 1.08   CALCIUM 9.9 9.4 8.7* 9.1   MG 2.60 2.20  --   --    ALBUMIN 3.1* 2.9* 2.7*  --    ALKPHOS 65 61 59  --    ALT 34 34 35  --    AST 62* 56* 58*  --    BILITOT 1.0 1.1 1.4  --      Microbiology Results (last 7 days)       ** No results found for the last 168 hours. **             See below for Radiology    Assessment/Plan:  Acute dehydration, resolved  Severe free water deficit/acute hypernatremic hyperchloremia , resolved  ALFONSO on chronic kidney disease, stage III, resolved  Acute on chronic encephalopathy-metabolic secondary to free water deficit   History of essential HTN-now borderline hypotensive   History of CAD on dual antiplatelets   HLD   Advanced dementia   BPH   Prophylaxis     Hypernatremia resolved  DC IV fluids  Patient remains intermittently confused, which is likely his baseline  Continue to hold all nephrotoxic agents   Renal ultrasound shows no hydronephrosis   ALFONSO resolved  CT head was negative   Leukocytosis resolved   Hold Flomax due to hypotension   Further recs based on clinical course   Patient has a advanced dementia, suspect patient may need some sort of feeding support in the future if continues to have poor intake  Patient DNR, we will consult palliative care for further goals of care  Case management following along for discharge planning  Labs in a.m.      VTE prophylaxis:  Lovenox    Patient condition:  Guarded    Anticipated discharge and Disposition:   Likely return back to nursing home vs memory Care unit      All diagnosis and differential  diagnosis have been reviewed; assessment and plan has been documented; I have personally reviewed the labs and test results that are presently available; I have reviewed the patients medication list; I have reviewed the consulting providers response and recommendations. I have reviewed or attempted to review medical records based upon their availability    All of the patient's questions have been  addressed and answered. Patient's is agreeable to the above stated plan. I will continue to monitor closely and make adjustments to medical management as needed.    Portions of this note dictated using EMR integrated voice recognition software, and may be subject to voice recognition errors not corrected at proofreading. Please contact writer for clarification if needed.   _____________________________________________________________________    Malnutrition Status:    Scheduled Med:   aspirin  81 mg Oral Daily    atorvastatin  20 mg Oral Daily    clopidogreL  75 mg Oral Daily    enoxparin  30 mg Subcutaneous Q24H (prophylaxis, 1700)    famotidine  40 mg Oral Daily    finasteride  5 mg Oral Daily    sodium chloride 0.9%  10 mL Intravenous Q6H      Continuous Infusions:   dextrose 5 % with KCl 20 mEq   Intravenous Continuous 75 mL/hr at 08/18/24 0659 New Bag at 08/18/24 0659      PRN Meds:    Current Facility-Administered Medications:     acetaminophen, 650 mg, Oral, Q4H PRN    hydrALAZINE, 10 mg, Intravenous, Q4H PRN    ondansetron, 4 mg, Intravenous, Q6H PRN    Flushing PICC/Midline Protocol, , , Until Discontinued **AND** sodium chloride 0.9%, 10 mL, Intravenous, Q6H **AND** sodium chloride 0.9%, 10 mL, Intravenous, PRN     Radiology:  I have personally reviewed the following imaging and agree with the radiologist.     CT Cervical Spine Without Contrast  Narrative: EXAMINATION:  CT CERVICAL SPINE WITHOUT CONTRAST    CLINICAL HISTORY:  Neck trauma.    TECHNIQUE:  Low dose axial images, sagittal and coronal reformations  were performed though the cervical spine. Contrast was not administered. Dose reduction techniques including automatic exposure control (AEC) were utilized.    Dose (DLP): 1281 mGycm (2 studies)    COMPARISON:  None    FINDINGS:  Trace retrolisthesis of C4 on C5. Lateral masses of C1 and C2 are congruent.    Subtle chronic superior C7 and T1 vertebral endplate compression fractures which are favored chronic.  No significant height loss.  Vertebral body heights are maintained.    Multilevel spondylosis, greatest at C3-4 where there is moderate AP narrowing the spinal canal.  Moderate-severe right foraminal stenosis at and minimal left foraminal stenosis at C3-4.  Moderate-severe right and moderate left foraminal stenosis at C4-5.  Mild right foraminal stenosis at C5-6.    Paravertebral soft tissues are normal.    Bilateral carotid bifurcation calcifications.  Impression: Nighthawk concordant.  No acute fracture or traumatic malalignment of the cervical spine.    Electronically signed by: Mark Anthony Churchill MD  Date:    08/17/2024  Time:    08:40  CT Head Without Contrast  Narrative: EXAMINATION:  CT HEAD WITHOUT CONTRAST    CLINICAL HISTORY:  Head trauma, minor.  Abnormal labs.    TECHNIQUE:  Low dose axial images were obtained through the head.  Coronal and sagittal reformations were also performed. Contrast was not administered.  Dose reduction techniques including automatic exposure control (AEC) were utilized.    Dose (DLP): 1281 mGycm (2 studies)    COMPARISON:  CT head without contrast, 08/14/2024.    FINDINGS:  INTRACRANIAL: Stable global volume loss.  Scattered white matter hypodensities, likely related to chronic microvascular ischemic change.  Gray-white differentiation is preserved.  No acute intracranial hemorrhage.  No hydrocephalus.  No intracranial mass effect.    SINUSES: Moderate left sphenoid sinus mucosal thickening with frothy opacity, increased compared to prior study from 08/14/2024..  Trace  inspissated mucus in the right dorsal ethmoid air cells.  Minimal bilateral ethmoid sinus mucosal thickening.  Mastoid air cells are clear.    SKULL/SCALP: No acute calvarial abnormality.  Incidental persistent metopic suture.  Opacities in the external auditory canals, likely cerumen.    ORBITS: Bilateral lens replacements.  Impression: Nighthawk concordant.  No acute intracranial abnormality.    Electronically signed by: Mark Anthony Churchill MD  Date:    08/17/2024  Time:    08:36      Mame You DO  Department of Hospital Medicine  [facility]  08/18/2024

## 2024-08-18 NOTE — NURSING
Nurses Note -- 4 Eyes      8/18/2024   6:06 PM      Skin assessed during: Q Shift Change      [] No Altered Skin Integrity Present    []Prevention Measures Documented      [x] Yes- Altered Skin Integrity Present or Discovered   [] LDA Added if Not in Epic (Describe Wound)   [] New Altered Skin Integrity was Present on Admit and Documented in LDA   [] Wound Image Taken    Wound Care Consulted? No    Attending Nurse:  Keily Wang RN    Second RN/Staff Member:  Jonny Crawford RN

## 2024-08-18 NOTE — NURSING
Nurses Note -- 4 Eyes      8/17/2024   8:00 PM      Skin assessed during: Q Shift Change      [] No Altered Skin Integrity Present    []Prevention Measures Documented      [x] Yes- Altered Skin Integrity Present or Discovered   [] LDA Added if Not in Epic (Describe Wound)   [] New Altered Skin Integrity was Present on Admit and Documented in LDA   [] Wound Image Taken    Wound Care Consulted? No    Attending Nurse:  Jonny Suh RN/Staff Member:   Keily

## 2024-08-19 LAB
ANION GAP SERPL CALC-SCNC: 8 MEQ/L
BUN SERPL-MCNC: 14.6 MG/DL (ref 8.4–25.7)
CALCIUM SERPL-MCNC: 8.9 MG/DL (ref 8.8–10)
CHLORIDE SERPL-SCNC: 114 MMOL/L (ref 98–107)
CO2 SERPL-SCNC: 20 MMOL/L (ref 23–31)
CREAT SERPL-MCNC: 0.99 MG/DL (ref 0.73–1.18)
CREAT/UREA NIT SERPL: 15
GFR SERPLBLD CREATININE-BSD FMLA CKD-EPI: >60 ML/MIN/1.73/M2
GLUCOSE SERPL-MCNC: 95 MG/DL (ref 82–115)
POTASSIUM SERPL-SCNC: 3.5 MMOL/L (ref 3.5–5.1)
SODIUM SERPL-SCNC: 142 MMOL/L (ref 136–145)

## 2024-08-19 PROCEDURE — 25000003 PHARM REV CODE 250: Performed by: INTERNAL MEDICINE

## 2024-08-19 PROCEDURE — 99233 SBSQ HOSP IP/OBS HIGH 50: CPT | Mod: ,,, | Performed by: INTERNAL MEDICINE

## 2024-08-19 PROCEDURE — 97530 THERAPEUTIC ACTIVITIES: CPT

## 2024-08-19 PROCEDURE — 25000003 PHARM REV CODE 250: Performed by: STUDENT IN AN ORGANIZED HEALTH CARE EDUCATION/TRAINING PROGRAM

## 2024-08-19 PROCEDURE — A4216 STERILE WATER/SALINE, 10 ML: HCPCS | Performed by: STUDENT IN AN ORGANIZED HEALTH CARE EDUCATION/TRAINING PROGRAM

## 2024-08-19 PROCEDURE — 99497 ADVNCD CARE PLAN 30 MIN: CPT | Mod: ,,, | Performed by: INTERNAL MEDICINE

## 2024-08-19 PROCEDURE — 21400001 HC TELEMETRY ROOM

## 2024-08-19 PROCEDURE — 80048 BASIC METABOLIC PNL TOTAL CA: CPT | Performed by: STUDENT IN AN ORGANIZED HEALTH CARE EDUCATION/TRAINING PROGRAM

## 2024-08-19 PROCEDURE — 63600175 PHARM REV CODE 636 W HCPCS: Performed by: NURSE PRACTITIONER

## 2024-08-19 PROCEDURE — 36415 COLL VENOUS BLD VENIPUNCTURE: CPT | Performed by: STUDENT IN AN ORGANIZED HEALTH CARE EDUCATION/TRAINING PROGRAM

## 2024-08-19 RX ADMIN — CLOPIDOGREL BISULFATE 75 MG: 75 TABLET ORAL at 09:08

## 2024-08-19 RX ADMIN — FAMOTIDINE 40 MG: 20 TABLET, FILM COATED ORAL at 09:08

## 2024-08-19 RX ADMIN — ATORVASTATIN CALCIUM 20 MG: 10 TABLET, FILM COATED ORAL at 09:08

## 2024-08-19 RX ADMIN — ASPIRIN 81 MG CHEWABLE TABLET 81 MG: 81 TABLET CHEWABLE at 09:08

## 2024-08-19 RX ADMIN — Medication 10 ML: at 12:08

## 2024-08-19 RX ADMIN — FINASTERIDE 5 MG: 5 TABLET, FILM COATED ORAL at 09:08

## 2024-08-19 RX ADMIN — ENOXAPARIN SODIUM 30 MG: 40 INJECTION SUBCUTANEOUS at 06:08

## 2024-08-19 NOTE — PROGRESS NOTES
Patient Name: Nirmal [place]   MRN: [id number]   Admission Date: 8/14/2024   Hospital Length of Stay: 5   Attending Provider: Mame You DO   Consulting Provider: Wil Chao MD    Primary Care Physician:  Alexander Guallpa MD     Principal Problem: <principal problem not specified>       Final diagnoses:  [E87.0] Hypernatremia (Primary)  [N17.9] Acute kidney injury  [G93.41] Metabolic encephalopathy  [E86.0] Dehydration      Assessment/Plan:     I attempted to contact the patient's stepdaughter, Alaina, by phone but was not successful.  I did finally reach her , Franki by phone.  He is also directly involved in the patient's care.  He explained that his wife together with his  share power of  over the patient.  The patient has children, however he has been estranged from them for over 40 years.  There had been some connections in the latter years, however they have neglected to follow-up on this especially when the patient declined with his illness.      He described the patient's decline over the past 2 months in which he has gone from living alone and independently to complete dependency for all activities of daily living including eating and drinking.  He has dementia for which she is been found at home with self neglect, not eating and drinking and not taking his medications.  He became severely dehydrated which resulted in hospitalizations.  He was hospitalized from July 24th through August 6th and again July 29th.  I spoke with him about his goals for the patient upon discharge.  There is plans for admission to an assisted living facility, memory care unit.  However he is not to be accepted there until the patient is physically stronger.  For this reason there elected to send him to another skilled rehab facility.      I reviewed with him the option of hospice care while living in the assisted living facility.      Hospice review:     I reviewed the benefits of home hospice care,  including aggressive symptom-based management with the intent to avoid future hospitalizations which may increase increase the risk of having a negative effect on her quality of life. I reviewed the benefit of regular visits by an assigned RN and 24/7 on call RN to address uncontrolled symptoms which may precipitate into a symptom crisis. This may prevent unwanted ER visits or hospitalizations ordinarily necessary to manage symptoms of an advanced illness. I also reviewed benefit of regular CNA visits and the option of  and  visitations. I explained that all medications related to her diagnosis and symptom related medications would be covered by hospice, including oxygen, a hospital bed and other durable medical equipment.     I explained to him that the collection of hospice could not take place while the patient is in a skilled rehab, nor could they elect home health during that time as well.  I explained that it is important to recognize that due to his advanced and progressive dementia, the patient is at risk for recurrent hospitalizations due to exacerbations of confusion and agitation as well as decreased food and drink intake.  Changes in his familiar environment may also further worsen his dementia and negatively affect his quality of life.  It may be beneficial to elect hospice services to assist him with his comfort and quality of life while in the assisted living facility.  He stated that he appreciates this education and will talk to his wife further about this option.    I explained that the patient would be a candidate for placement of a PEG tube for the purpose of enteral nutrition long-term.  However, he would be at risk of pulling out the tube due to confusion.  He has been found to pull off his counts and attempt to pull IVs out in the rooms.  Mr. Doan stated that he recognizes this as a risk.  Alternatively, if there is a goal to continue to feed the patient by mouth, it is  important to recognize his limitations due to his illness.  He could potentially have intermittent IV fluids, however this would be of little benefit if the patient is eating and drinking too little.  Also recurring hospitalizations for this purpose would be detrimental to the patient's quality of life.  He stated that Ms. Foley may elect PEG tube placement, although he would not himself.  They will have to have this discussion at a later time.      Advance Care Planning  Code Status  In light of the patients advanced and terminal illness, we reviewed what the patients preferences for care would be at the very end of life.  The patient wishes to have a natural, peaceful death.  Along those lines, the patient does not wish to have CPR or other invasive treatments performed when his heart and/or breathing stops.  I communicated to the patient that a DNR order would be placed in his medical record to reflect this preference.     Addendum: I called patient's step-daughter/ POA by phone. She had some questions that I answered regarding prognosis, the option for hospice vs home health and goals for the future. She agrees that she would not wish for the patient to be hospitalized in the future. However, she wishes to proceed with SNF, then [facility] with memory care if accepted. She did not state whether or not she would elect hospice at that time, but I clarified that hospice could be added once he is discharged from SNF.    Interval History:     Some improvement in his ability to communicate since my visit 3 days ago. However, his son-in-law states that some days are better than others. The patient was only A&O x 1 on my visit.      Active Ambulatory Problems     Diagnosis Date Noted    No Active Ambulatory Problems     Resolved Ambulatory Problems     Diagnosis Date Noted    No Resolved Ambulatory Problems     No Additional Past Medical History        No past surgical history on file.     Review of patient's allergies  indicates:  No Known Allergies       Current Facility-Administered Medications:     acetaminophen tablet 650 mg, 650 mg, Oral, Q4H PRN, Conchita Mckeon PA-C    aspirin chewable tablet 81 mg, 81 mg, Oral, Daily, Brittany Davis MD, 81 mg at 08/19/24 0917    atorvastatin tablet 20 mg, 20 mg, Oral, Daily, Brittany Davis MD, 20 mg at 08/19/24 0917    clopidogreL tablet 75 mg, 75 mg, Oral, Daily, Brittany Davis MD, 75 mg at 08/19/24 0917    enoxaparin injection 30 mg, 30 mg, Subcutaneous, Q24H (prophylaxis, 1700), Karely Garcia, FNP, 30 mg at 08/18/24 1646    famotidine tablet 40 mg, 40 mg, Oral, Daily, Brittany Davis MD, 40 mg at 08/19/24 0917    finasteride tablet 5 mg, 5 mg, Oral, Daily, Brittany Davis MD, 5 mg at 08/19/24 0917    hydrALAZINE injection 10 mg, 10 mg, Intravenous, Q4H PRN, Conchita Mckeon PA-C    ondansetron injection 4 mg, 4 mg, Intravenous, Q6H PRN, Conchita Mckeon PA-C       Current Facility-Administered Medications:     acetaminophen, 650 mg, Oral, Q4H PRN    hydrALAZINE, 10 mg, Intravenous, Q4H PRN    ondansetron, 4 mg, Intravenous, Q6H PRN     No family history on file.       Review of Systems   Unable to perform ROS: Acuity of condition (Unable to assess 2/2 current altered mental status/encephalopathy/dementia)            Objective:   /65   Pulse 87   Temp 98 °F (36.7 °C) (Oral)   Resp 18   Ht 6' (1.829 m)   Wt 73.3 kg (161 lb 9.6 oz)   SpO2 99%   BMI 21.92 kg/m²      Physical Exam   Constitutional:  Non-toxic appearance. He appears ill.   HENT:   Head: Normocephalic.   Right Ear: External ear normal.   Left Ear: External ear normal.   Nose: Nose normal.   Mouth/Throat: Mucous membranes are moist. Oropharynx is clear.   Eyes: Pupils are equal, round, and reactive to light. Conjunctivae are normal.   Cardiovascular: Normal rate, regular rhythm, normal heart sounds and normal pulses. Pulmonary:      Effort: Pulmonary effort is normal.      Breath sounds: Normal breath  sounds.     Abdominal: Soft. Bowel sounds are normal. He exhibits no distension. There is no abdominal tenderness.   Musculoskeletal:      Right lower leg: No edema.      Left lower leg: No edema.   Neurological: He is disoriented.   Skin: Skin is warm.   Vitals reviewed.         Review of Symptoms  Review of Symptoms      Symptom Assessment (ESAS 0-10 Scale)  Unable to complete assessment due to Dementia     CAM / Delirium:  Negative      Pain Assessment in Advanced Demential Scale (PAINAD)   Breathing - Independent of vocalization:  0  Negative vocalization:  0  Facial expression:  0  Body language:  0  Consolability:  0  Total:  0    Performance Status:  30    Living Arrangements:  Lives in home (Currently living at SNF prior to ED presentation)    Psychosocial/Cultural:   See Palliative Psychosocial Note: No  **Primary  to Follow**  Palliative Care  Consult: No     Time-Based Charting:  Yes    Total Time Spent: 0 minutes      Advance Care Planning   Advance Directives:   Do Not Resuscitate Status: Yes      Decision Making:  Family answered questions and Patient unable to communicate due to disease severity/cognitive impairment  Goals of Care: The family endorses that what is most important right now is to focus on quality of life, even if it means sacrificing a little time and improvement in condition but with limits to invasive therapies    Accordingly, we have decided that the best plan to meet the patient's goals includes continuing with treatment                > 50% of 35 min of encounter was spent in chart review, face to face discussion of goals of care, symptom assessment, coordination of care and emotional support.     An additional 40 min of time was spent in Advanced Care Planning discussion    Wil Chao MD  Palliative Medicine  [facility]

## 2024-08-19 NOTE — PLAN OF CARE
Received callback from daughter Alaina. Patient was assessed by [facility] on Friday and he is still not ready for memory care unit and suggested that he complete his therapy at snf. Patient has already been denied by [facility] and Ryann of [facility]. She did speak to [facility] on Friday. If they are unable to accept she is ok with him returning to [facility].

## 2024-08-19 NOTE — PLAN OF CARE
[facility] spoke with [facility], who states they are reaching out to billing and [facility] and will let me know if they are able to accept pt.     Spoke with Ann Marie again who states they are unable to meet pt's needs.

## 2024-08-19 NOTE — NURSING
Nurses Note -- 4 Eyes      8/19/2024   5:47 PM      Skin assessed during: Q Shift Change      [] No Altered Skin Integrity Present    []Prevention Measures Documented      [x] Yes- Altered Skin Integrity Present or Discovered   [] LDA Added if Not in Epic (Describe Wound)   [] New Altered Skin Integrity was Present on Admit and Documented in LDA   [] Wound Image Taken    Wound Care Consulted? No    Attending Nurse:  Blanche Suh RN/Staff Member:   ILIANA Arellano     No

## 2024-08-19 NOTE — PROGRESS NOTES
Inpatient Nutrition Assessment    Admit Date: 8/14/2024   Total duration of encounter: 5 days   Patient Age: 89 y.o.    Nutrition Recommendation/Prescription     Diet [Diet Pureed (IDDSI Level 4)] as tolerated.   Boost Very High Calorie (provides 530 kcal, 22 g protein per serving) BID.    Communication of Recommendations: reviewed with nurse    Nutrition Assessment     Malnutrition Assessment/Nutrition-Focused Physical Exam    Malnutrition Context: chronic illness (08/15/24 1255)  Malnutrition Level: severe (08/15/24 1255)  Energy Intake (Malnutrition):  (unable to evaluate) (08/15/24 1255)  Weight Loss (Malnutrition):  (unable to evaluate) (08/15/24 1255)  Subcutaneous Fat (Malnutrition): severe depletion (08/15/24 1255)     Upper Arm Region (Subcutaneous Fat Loss): severe depletion     Muscle Mass (Malnutrition): severe depletion (08/15/24 1255)  Latter-day Region (Muscle Loss): severe depletion                 Anterior Thigh Region (Muscle Loss): severe depletion              A minimum of two characteristics is recommended for diagnosis of either severe or non-severe malnutrition.    Chart Review    Reason Seen: follow-up    Malnutrition Screening Tool Results   Have you recently lost weight without trying?: Unsure  Have you been eating poorly because of a decreased appetite?: Yes   MST Score: 3   Diagnosis:  Hypernatremia/dehydration  ALFONSO/CKD  Leukocytosis  Laceration right forearm    Relevant Medical History: CAD, HTN, CKD, dementia     Scheduled Medications:  aspirin, 81 mg, Daily  atorvastatin, 20 mg, Daily  clopidogreL, 75 mg, Daily  enoxparin, 30 mg, Q24H (prophylaxis, 1700)  famotidine, 40 mg, Daily  finasteride, 5 mg, Daily    Continuous Infusions: none       PRN Medications:   acetaminophen, 650 mg, Q4H PRN  hydrALAZINE, 10 mg, Q4H PRN  ondansetron, 4 mg, Q6H PRN    Calorie Containing IV Medications: no significant kcals from medications at this time    Recent Labs   Lab 08/14/24  0618 08/14/24  1257  08/15/24  0514 08/16/24  0816 08/17/24  0238 08/19/24  0512   * 155* 149* 142 144 142   K 3.1* 3.8 2.9* 3.4* 3.6 3.5   CALCIUM 10.2* 9.9 9.4 8.7* 9.1 8.9   PHOS  --  3.0  --   --   --   --    MG  --  2.60 2.20  --   --   --    CO2 23 20* 21* 19* 21* 20*   BUN 37.7* 35.4* 27.2* 15.1 18.1 14.6   CREATININE 1.79* 1.84* 1.46* 1.15 1.08 0.99   EGFRNORACEVR 36 35 46 >60 >60 >60   GLUCOSE 89 95 103 111 102 95   BILITOT  --  1.0 1.1 1.4  --   --    ALKPHOS  --  65 61 59  --   --    ALT  --  34 34 35  --   --    AST  --  62* 56* 58*  --   --    ALBUMIN  --  3.1* 2.9* 2.7*  --   --    TRIG 140  --   --   --   --   --    WBC 12.44* 12.62* 11.18  --  10.84  --    HGB 12.8* 12.9* 11.7*  --  12.8*  --    HCT 38.9* 37.9* 34.7*  --  37.7*  --      Nutrition Orders:  Diet Pureed (IDDSI Level 4)  Dietary nutrition supplements BID; Boost Very High Calorie Nutritional Drink - Any flavor    Appetite/Oral Intake: fair/50-75% of meals  Factors Affecting Nutritional Intake: impaired cognitive status/motor control and decreased appetite  Social Needs Impacting Access to Food: none identified  Food/Temple/Cultural Preferences: unable to obtain  Food Allergies: no known food allergies  Last Bowel Movement: 08/19/24  Wound(s):     Wound 08/16/24 Pressure Injury Left Gluteal cleft-Tissue loss description: Partial thickness    Comments    8/15/24 Patient unable to answer questions, poor intake so far reported by nursing, will add oral supplement.  8/19/24 Nurse reports intake is improved, receiving assistance with meals.    Anthropometrics    Height: 6' (182.9 cm),    Last Weight: 73.3 kg (161 lb 9.6 oz) (08/15/24 1241), Weight Method: Bed Scale  BMI (Calculated): 21.9  BMI Classification: underweight (BMI less than 22 if >65 years of age)        Ideal Body Weight (IBW), Male: 178 lb     % Ideal Body Weight, Male (lb): 90.79 %                          Usual Weight Provided By: unable to obtain usual weight    Wt Readings from Last 5  Encounters:   08/15/24 73.3 kg (161 lb 9.6 oz)   08/07/24 81.6 kg (180 lb)     Weight Change(s) Since Admission:   (8/15) question accuracy of admission weight, took bed weight (73.3 kg)  Wt Readings from Last 1 Encounters:   08/15/24 1241 73.3 kg (161 lb 9.6 oz)   08/14/24 1235 81.6 kg (180 lb)   Admit Weight: 81.6 kg (180 lb) (08/14/24 1235), Weight Method: Bed Scale    Estimated Needs    Weight Used For Calorie Calculations: 73.3 kg (161 lb 9.6 oz)  Energy Calorie Requirements (kcal): 3304-0810, 1.2-1.4 stress factor  Energy Need Method: [place]-St Jeor  Weight Used For Protein Calculations: 73.3 kg (161 lb 9.6 oz)  Protein Requirements:  g, 1.2-1.4 g/kg  Fluid Requirements (mL): 1833, 25 ml/kg      Enteral Nutrition Patient not receiving enteral nutrition at this time.    Parenteral Nutrition Patient not receiving parenteral nutrition support at this time.    Evaluation of Received Nutrient Intake    Calories: meeting estimated needs  Protein: meeting estimated needs    Patient Education Not applicable.    Nutrition Diagnosis     PES: Inadequate energy intake related to acute illness as evidenced by less than 80% needs met. (resolved)  PES: Severe chronic disease or condition related malnutrition related to suboptimal protein/energy intake as evidenced by severe fat depletion and severe muscle depletion. (active)    Nutrition Interventions     Intervention(s): modified composition of meals/snacks, commercial beverage, and collaboration with other providers  Goal: Meet greater than 80% of nutritional needs by follow-up. (goal met)    Nutrition Goals & Monitoring     Dietitian will monitor: food and beverage intake, energy intake, enteral nutrition intake, weight, electrolyte/renal panel, beliefs/attitudes, glucose/endocrine profile, and gastrointestinal profile  Discharge planning: too early to determine; pending clinical course  Nutrition Risk/Follow-Up: high (follow-up in 1-4 days)   Please consult if  re-assessment needed sooner.

## 2024-08-19 NOTE — PROGRESS NOTES
[facility] Hospital Medicine Progress Note        Chief Complaint: Inpatient Follow-up for     HPI:    89-year-old male with significant history of HTN, HLD, chronic kidney disease, advanced dementia with baseline encephalopathy, BPH, CAD on dual antiplatelets was sent to the ED from nursing home with altered mental status, increasing lethargic.  Patient is bed/chair bound at baseline.  He had a right forearm laceration which he sustained 2 days back when he was trying to get into the wheelchair and was caught, laceration was repaired.  Labs ordered given altered mental status/lethargic which showed abnormality and therefore was sent to the ED. hypotensive the ED, slightly improved with IV fluid resuscitation.  Lab significant for mild leukocytosis, free water deficit with sodium 155 and chloride 125, also noted renal insufficiency with creatinine-1.84.  Hospitalist team consulted for admission, he received 1 L Ringer lactate in the ED. CT head was done which was unremarkable.  Patient has slight ALFONSO likely secondary to dehydration.  Renal ultrasound showed no hydronephrosis.     Patient was started on D5 and improvement in hypernatremia.  Hypernatremia resolved.  Patient continues to remain confused which was largely his baseline.  Palliative Care was consulted during hospitalization for assistance with goals of care.    Interval Hx:   Patient seen and examined by bedside.  No acute overnight events remains at baseline mentation ibrahim    Case was discussed with patient's nurse and  on the floor.    Objective/physical exam:  General: In no acute distress, afebrile  Chest: Clear to auscultation bilaterally  Heart: RRR, +S1, S2, no appreciable murmur  Abdomen: Soft, nontender, BS +  MSK: Warm, no lower extremity edema, no clubbing or cyanosis  Neurologic confused    VITAL SIGNS: 24 HRS MIN & MAX LAST   Temp  Min: 97.8 °F (36.6 °C)  Max: 98.1 °F (36.7 °C) 98 °F (36.7 °C)   BP   Min: 94/62  Max: 121/65 121/65   Pulse  Min: 87  Max: 114  87   Resp  Min: 18  Max: 19 18   SpO2  Min: 96 %  Max: 99 % 99 %     I have reviewed the following labs:  Recent Labs   Lab 08/14/24  1257 08/15/24  0514 08/17/24  0238   WBC 12.62* 11.18 10.84   RBC 3.95* 3.60* 3.92*   HGB 12.9* 11.7* 12.8*   HCT 37.9* 34.7* 37.7*   MCV 95.9* 96.4* 96.2*   MCH 32.7* 32.5* 32.7*   MCHC 34.0 33.7 34.0   RDW 15.0 14.7 14.4    223 171   MPV 10.2 10.4 10.4     Recent Labs   Lab 08/14/24  1257 08/15/24  0514 08/16/24 0816 08/17/24 0238 08/19/24  0512   * 149* 142 144 142   K 3.8 2.9* 3.4* 3.6 3.5   * 119* 114* 114* 114*   CO2 20* 21* 19* 21* 20*   BUN 35.4* 27.2* 15.1 18.1 14.6   CREATININE 1.84* 1.46* 1.15 1.08 0.99   CALCIUM 9.9 9.4 8.7* 9.1 8.9   MG 2.60 2.20  --   --   --    ALBUMIN 3.1* 2.9* 2.7*  --   --    ALKPHOS 65 61 59  --   --    ALT 34 34 35  --   --    AST 62* 56* 58*  --   --    BILITOT 1.0 1.1 1.4  --   --      Microbiology Results (last 7 days)       ** No results found for the last 168 hours. **             See below for Radiology    Assessment/Plan:  Acute dehydration, resolved  Severe free water deficit/acute hypernatremic hyperchloremia , resolved  ALFONSO on chronic kidney disease, stage III, resolved  Acute on chronic encephalopathy-metabolic secondary to free water deficit   History of essential HTN-now borderline hypotensive   History of CAD on dual antiplatelets   HLD   Advanced dementia   BPH   Prophylaxis     Hypernatremia resolved  Patient remains intermittently confused, which is likely his baseline  Renal ultrasound shows no hydronephrosis   ALFONSO resolved  CT head was negative   Leukocytosis resolved   Hold Flomax due to hypotension   Further recs based on clinical course   Patient has a advanced dementia, suspect patient may need some sort of feeding support in the future if continues to have poor intake  Patient DNR, we will consult palliative care for further goals of care  Case  management following along for discharge planning  Patient is medically stable, case management working on placement  Labs in a.m.      VTE prophylaxis:  Lovenox    Patient condition:  Guarded    Anticipated discharge and Disposition:   Likely return back to nursing home vs memory Care unit      All diagnosis and differential diagnosis have been reviewed; assessment and plan has been documented; I have personally reviewed the labs and test results that are presently available; I have reviewed the patients medication list; I have reviewed the consulting providers response and recommendations. I have reviewed or attempted to review medical records based upon their availability    All of the patient's questions have been  addressed and answered. Patient's is agreeable to the above stated plan. I will continue to monitor closely and make adjustments to medical management as needed.    Portions of this note dictated using EMR integrated voice recognition software, and may be subject to voice recognition errors not corrected at proofreading. Please contact writer for clarification if needed.   _____________________________________________________________________    Malnutrition Status:    Scheduled Med:   aspirin  81 mg Oral Daily    atorvastatin  20 mg Oral Daily    clopidogreL  75 mg Oral Daily    enoxparin  30 mg Subcutaneous Q24H (prophylaxis, 1700)    famotidine  40 mg Oral Daily    finasteride  5 mg Oral Daily      Continuous Infusions:       PRN Meds:    Current Facility-Administered Medications:     acetaminophen, 650 mg, Oral, Q4H PRN    hydrALAZINE, 10 mg, Intravenous, Q4H PRN    ondansetron, 4 mg, Intravenous, Q6H PRN     Radiology:  I have personally reviewed the following imaging and agree with the radiologist.     CT Cervical Spine Without Contrast  Narrative: EXAMINATION:  CT CERVICAL SPINE WITHOUT CONTRAST    CLINICAL HISTORY:  Neck trauma.    TECHNIQUE:  Low dose axial images, sagittal and coronal  reformations were performed though the cervical spine. Contrast was not administered. Dose reduction techniques including automatic exposure control (AEC) were utilized.    Dose (DLP): 1281 mGycm (2 studies)    COMPARISON:  None    FINDINGS:  Trace retrolisthesis of C4 on C5. Lateral masses of C1 and C2 are congruent.    Subtle chronic superior C7 and T1 vertebral endplate compression fractures which are favored chronic.  No significant height loss.  Vertebral body heights are maintained.    Multilevel spondylosis, greatest at C3-4 where there is moderate AP narrowing the spinal canal.  Moderate-severe right foraminal stenosis at and minimal left foraminal stenosis at C3-4.  Moderate-severe right and moderate left foraminal stenosis at C4-5.  Mild right foraminal stenosis at C5-6.    Paravertebral soft tissues are normal.    Bilateral carotid bifurcation calcifications.  Impression: Nighthawk concordant.  No acute fracture or traumatic malalignment of the cervical spine.    Electronically signed by: Mark Anthony Churchill MD  Date:    08/17/2024  Time:    08:40  CT Head Without Contrast  Narrative: EXAMINATION:  CT HEAD WITHOUT CONTRAST    CLINICAL HISTORY:  Head trauma, minor.  Abnormal labs.    TECHNIQUE:  Low dose axial images were obtained through the head.  Coronal and sagittal reformations were also performed. Contrast was not administered.  Dose reduction techniques including automatic exposure control (AEC) were utilized.    Dose (DLP): 1281 mGycm (2 studies)    COMPARISON:  CT head without contrast, 08/14/2024.    FINDINGS:  INTRACRANIAL: Stable global volume loss.  Scattered white matter hypodensities, likely related to chronic microvascular ischemic change.  Gray-white differentiation is preserved.  No acute intracranial hemorrhage.  No hydrocephalus.  No intracranial mass effect.    SINUSES: Moderate left sphenoid sinus mucosal thickening with frothy opacity, increased compared to prior study from 08/14/2024..   Trace inspissated mucus in the right dorsal ethmoid air cells.  Minimal bilateral ethmoid sinus mucosal thickening.  Mastoid air cells are clear.    SKULL/SCALP: No acute calvarial abnormality.  Incidental persistent metopic suture.  Opacities in the external auditory canals, likely cerumen.    ORBITS: Bilateral lens replacements.  Impression: Nighthawk concordant.  No acute intracranial abnormality.    Electronically signed by: Mark Anthony Churchill MD  Date:    08/17/2024  Time:    08:36      Mame You DO  Department of Hospital Medicine  [facility]  08/19/2024

## 2024-08-19 NOTE — PLAN OF CARE
Notified by md that patient is ready for discharge. Follow up phone made to daughter in regards to discharge back to snf or memory care unit.

## 2024-08-19 NOTE — PT/OT/SLP PROGRESS
Physical Therapy Treatment    Patient Name:  Nirmal Colorado   MRN:  [id number]    Recommendations:     Discharge therapy intensity: Moderate Intensity Therapy   Discharge Equipment Recommendations: to be determined by next level of care  Barriers to discharge:  medical dx,  impaired mobility, decreased independence    Assessment:     Nirmal Colorado is a 89 y.o. male admitted with a medical diagnosis of R forearm laceration, AMS, lethargy, dehydration, acute encephalopathy 2/2 free water deficit. Hx of dementia.   He presents with the following impairments/functional limitations: weakness, gait instability, decreased upper extremity function, impaired endurance, impaired balance, impaired functional mobility, decreased safety awareness, impaired cognition, impaired self care skills, decreased lower extremity function, impaired coordination.    Rehab Prognosis: Good; patient would benefit from acute skilled PT services to address these deficits and reach maximum level of function.    Recent Surgery: * No surgery found *      Plan:     During this hospitalization, patient would benefit from acute PT services 5 x/week to address the identified rehab impairments via gait training, therapeutic activities, therapeutic exercises, neuromuscular re-education and progress toward the following goals:    Plan of Care Expires:  09/16/24    Subjective     Chief Complaint: n/a  Patient/Family Comments/goals: to get stronger   Pain/Comfort:  Pain Rating 1: 0/10      Objective:     Communicated with NSG prior to session.  Patient found HOB elevated with peripheral IV (roll belt) upon PT entry to room.     General Precautions: Standard, fall, hearing impaired  Orthopedic Precautions: N/A  Braces: N/A  Respiratory Status: Room air      Functional Mobility:  Bed Mobility:     Rolling: minimal assistance; performed at beginning of session 2/2 pt found soiled   Supine to Sit: moderate assistance  Sit to Supine: maximal assistance  Transfers:      Sit to/from Stand:  moderate assistance with rolling walker  X3 trials performed from EOB  Posterior lean with poor orientation for midline COG despite maxA  Excessive cervical flexion and kyphotic trunk  Pre-Gait: on trial one of standing, pt able to take x1 lateral step with ea LE; when instructed to do so on the following standing trials, pt unable to complete task  Balance: overall SBA for static sitting  Pt quick to fatigue, rest breaks required. Max cues 2/2 poor safety awareness and decreased cognition.    Education:  Patient provided with verbal education  regarding PT role/goals/POC, fall prevention, safety awareness, discharge/DME recommendations, and pressure ulcer prevention.  Additional teaching is warranted.     Patient left HOB elevated with all lines intact, call button in reach, RN notified, and roll belt donned, bed alarm on    GOALS:   Multidisciplinary Problems       Physical Therapy Goals          Problem: Physical Therapy    Goal Priority Disciplines Outcome Goal Variances Interventions   Physical Therapy Goal     PT, PT/OT Progressing     Description: Goals to be met by: 24     Patient will increase functional independence with mobility by performin. Supine to sit with MInimal Assistance  2. Sit to supine with MInimal Assistance  3. Sit to stand transfer with Minimal Assistance  4. Bed to chair transfer with Minimal Assistance using Rolling Walker  5. Sitting at edge of bed x20 minutes with Stand-by Assistance                         Time Tracking:     PT Received On: 24  PT Start Time: 1329     PT Stop Time: 1357  PT Total Time (min): 28 min     Billable Minutes: Therapeutic Activity 2    Treatment Type: Treatment  PT/PTA: PT     Number of PTA visits since last PT visit: 2     2024

## 2024-08-19 NOTE — PLAN OF CARE
Problem: Adult Inpatient Plan of Care  Goal: Optimal Comfort and Wellbeing  Outcome: Progressing     Problem: Fall Injury Risk  Goal: Absence of Fall and Fall-Related Injury  Outcome: Progressing     Problem: Skin Injury Risk Increased  Goal: Skin Health and Integrity  Outcome: Progressing     Problem: Wound  Goal: Absence of Infection Signs and Symptoms  Outcome: Progressing

## 2024-08-19 NOTE — NURSING
Nurses Note -- 4 Eyes      8/18/2024   7:52 PM      Skin assessed during: Q Shift Change      [] No Altered Skin Integrity Present    []Prevention Measures Documented      [x] Yes- Altered Skin Integrity Present or Discovered   [] LDA Added if Not in Epic (Describe Wound)   [] New Altered Skin Integrity was Present on Admit and Documented in LDA   [] Wound Image Taken    Wound Care Consulted? yes    Attending Nurse:  Cyndi Suh RN/Staff Member:   Keily ANNE

## 2024-08-20 VITALS
HEIGHT: 72 IN | BODY MASS INDEX: 21.89 KG/M2 | OXYGEN SATURATION: 97 % | TEMPERATURE: 98 F | WEIGHT: 161.63 LBS | SYSTOLIC BLOOD PRESSURE: 106 MMHG | RESPIRATION RATE: 18 BRPM | DIASTOLIC BLOOD PRESSURE: 74 MMHG | HEART RATE: 99 BPM

## 2024-08-20 PROCEDURE — 25000003 PHARM REV CODE 250: Performed by: INTERNAL MEDICINE

## 2024-08-20 RX ADMIN — ASPIRIN 81 MG CHEWABLE TABLET 81 MG: 81 TABLET CHEWABLE at 08:08

## 2024-08-20 RX ADMIN — FINASTERIDE 5 MG: 5 TABLET, FILM COATED ORAL at 08:08

## 2024-08-20 RX ADMIN — ATORVASTATIN CALCIUM 20 MG: 10 TABLET, FILM COATED ORAL at 08:08

## 2024-08-20 RX ADMIN — FAMOTIDINE 40 MG: 20 TABLET, FILM COATED ORAL at 08:08

## 2024-08-20 RX ADMIN — CLOPIDOGREL BISULFATE 75 MG: 75 TABLET ORAL at 08:08

## 2024-08-20 NOTE — PLAN OF CARE
Spoke to [facility] they are unable to meet the patient need.         [facility] has accepted the patient [facility] is waiting for ins Auth.

## 2024-08-20 NOTE — NURSING
Nurses Note -- 4 Eyes      8/20/2024   10:42 AM      Skin assessed during: Q Shift Change      [] No Altered Skin Integrity Present    [x]Prevention Measures Documented      [x] Yes- Altered Skin Integrity Present or Discovered   [] LDA Added if Not in Epic (Describe Wound)   [] New Altered Skin Integrity was Present on Admit and Documented in LDA   [] Wound Image Taken    Wound Care Consulted? Yes    Attending Nurse:  Arvind Suh RN/Staff Member:   Emanuel

## 2024-08-20 NOTE — PLAN OF CARE
08/20/24 0803   Medicare Message   Important Message from Medicare regarding Discharge Appeal Rights Given to patient/caregiver;Explained to patient/caregiver

## 2024-08-20 NOTE — PLAN OF CARE
08/20/24 1327   Final Note   Assessment Type Final Discharge Note   Anticipated Discharge Disposition SNF   Post-Acute Status   Post-Acute Authorization Placement   Post-Acute Placement Status Set-up Complete/Auth obtained   Discharge Delays None known at this time     Spoke to daughter Alaina on yesterday and updated on denials from [facility], hospitals, and [facility]. Patient also evaluated by [facility]. He is not appropriate for Memory Care Unit and they recommended completion of snf. She also spoke to Dr Chao about hospice in regards hospice. Received message this morning that she does not want to pursue hospice at this time and he will return to [facility].

## 2024-08-20 NOTE — DISCHARGE SUMMARY
[facility] Hospital Medicine Discharge Summary    Admit Date: 8/14/2024  Discharge Date and Time: 8/20/20249:58 AM  Admitting Physician: FELIPE Team  Discharging Physician: Mame You DO.  Primary Care Physician: Alexander Guallpa MD  Consults: None    Discharge Diagnoses:  Acute dehydration, resolved  Severe free water deficit/acute hypernatremic hyperchloremia , resolved  ALFONSO on chronic kidney disease, stage III, resolved  Acute on chronic encephalopathy-metabolic secondary to free water deficit   History of essential HTN-now borderline hypotensive   History of CAD on dual antiplatelets   HLD   Advanced dementia   BPH     Hospital Course:   89-year-old male with significant history of HTN, HLD, chronic kidney disease, advanced dementia with baseline encephalopathy, BPH, CAD on dual antiplatelets was sent to the ED from nursing home with altered mental status, increasing lethargic.  Patient is bed/chair bound at baseline.  He had a right forearm laceration which he sustained 2 days back when he was trying to get into the wheelchair and was caught, laceration was repaired.  Labs ordered given altered mental status/lethargic which showed abnormality and therefore was sent to the ED. hypotensive the ED, slightly improved with IV fluid resuscitation.  Lab significant for mild leukocytosis, free water deficit with sodium 155 and chloride 125, also noted renal insufficiency with creatinine-1.84.  Hospitalist team consulted for admission, he received 1 L Ringer lactate in the ED. CT head was done which was unremarkable.  Patient has slight ALFONSO likely secondary to dehydration.  Renal ultrasound showed no hydronephrosis.     Patient was started on D5 and improvement in hypernatremia.  Hypernatremia resolved.  Patient continues to remain confused which was largely his baseline.  Palliative Care was consulted during hospitalization for assistance with goals of care.    Patient improved clinically  and no further ALFONSO or hypernatremia was seen.  Patient was eating and drinking well with the assistance feeding.  We will discontinue all of his home blood pressure medications such as Lasix and Lopressor as his blood pressure here has been on normotensive and to further avoid any further dehydration and encephalopathy.  No other concerns at this time.  Patient will be returning back to nursing home.      Pt was seen and examined on the day of discharge  Vitals:  VITAL SIGNS: 24 HRS MIN & MAX LAST   Temp  Min: 97.6 °F (36.4 °C)  Max: 98.1 °F (36.7 °C) 97.6 °F (36.4 °C)   BP  Min: 99/53  Max: 110/66 110/66   Pulse  Min: 99  Max: 115  100   Resp  Min: 18  Max: 20 18   SpO2  Min: 94 %  Max: 99 % 96 %       Physical Exam:  General: In no acute distress, afebrile  Chest: Clear to auscultation bilaterally  Heart: RRR, +S1, S2, no appreciable murmur  Abdomen: Soft, nontender, BS +  MSK: Warm, no lower extremity edema, no clubbing or cyanosis  Neurologic intermittently confused, at baseline    Procedures Performed: No admission procedures for hospital encounter.     Significant Diagnostic Studies: See Full reports for all details    Recent Labs   Lab 08/14/24  1257 08/15/24  0514 08/17/24  0238   WBC 12.62* 11.18 10.84   RBC 3.95* 3.60* 3.92*   HGB 12.9* 11.7* 12.8*   HCT 37.9* 34.7* 37.7*   MCV 95.9* 96.4* 96.2*   MCH 32.7* 32.5* 32.7*   MCHC 34.0 33.7 34.0   RDW 15.0 14.7 14.4    223 171   MPV 10.2 10.4 10.4       Recent Labs   Lab 08/14/24  1257 08/15/24  0514 08/16/24  0816 08/17/24  0238 08/19/24  0512   * 149* 142 144 142   K 3.8 2.9* 3.4* 3.6 3.5   * 119* 114* 114* 114*   CO2 20* 21* 19* 21* 20*   BUN 35.4* 27.2* 15.1 18.1 14.6   CREATININE 1.84* 1.46* 1.15 1.08 0.99   CALCIUM 9.9 9.4 8.7* 9.1 8.9   MG 2.60 2.20  --   --   --    ALBUMIN 3.1* 2.9* 2.7*  --   --    ALKPHOS 65 61 59  --   --    ALT 34 34 35  --   --    AST 62* 56* 58*  --   --    BILITOT 1.0 1.1 1.4  --   --         Microbiology  Results (last 7 days)       ** No results found for the last 168 hours. **             CT Cervical Spine Without Contrast  Narrative: EXAMINATION:  CT CERVICAL SPINE WITHOUT CONTRAST    CLINICAL HISTORY:  Neck trauma.    TECHNIQUE:  Low dose axial images, sagittal and coronal reformations were performed though the cervical spine. Contrast was not administered. Dose reduction techniques including automatic exposure control (AEC) were utilized.    Dose (DLP): 1281 mGycm (2 studies)    COMPARISON:  None    FINDINGS:  Trace retrolisthesis of C4 on C5. Lateral masses of C1 and C2 are congruent.    Subtle chronic superior C7 and T1 vertebral endplate compression fractures which are favored chronic.  No significant height loss.  Vertebral body heights are maintained.    Multilevel spondylosis, greatest at C3-4 where there is moderate AP narrowing the spinal canal.  Moderate-severe right foraminal stenosis at and minimal left foraminal stenosis at C3-4.  Moderate-severe right and moderate left foraminal stenosis at C4-5.  Mild right foraminal stenosis at C5-6.    Paravertebral soft tissues are normal.    Bilateral carotid bifurcation calcifications.  Impression: Nighthawk concordant.  No acute fracture or traumatic malalignment of the cervical spine.    Electronically signed by: Mark Anthony Churchill MD  Date:    08/17/2024  Time:    08:40  CT Head Without Contrast  Narrative: EXAMINATION:  CT HEAD WITHOUT CONTRAST    CLINICAL HISTORY:  Head trauma, minor.  Abnormal labs.    TECHNIQUE:  Low dose axial images were obtained through the head.  Coronal and sagittal reformations were also performed. Contrast was not administered.  Dose reduction techniques including automatic exposure control (AEC) were utilized.    Dose (DLP): 1281 mGycm (2 studies)    COMPARISON:  CT head without contrast, 08/14/2024.    FINDINGS:  INTRACRANIAL: Stable global volume loss.  Scattered white matter hypodensities, likely related to chronic  microvascular ischemic change.  Gray-white differentiation is preserved.  No acute intracranial hemorrhage.  No hydrocephalus.  No intracranial mass effect.    SINUSES: Moderate left sphenoid sinus mucosal thickening with frothy opacity, increased compared to prior study from 08/14/2024..  Trace inspissated mucus in the right dorsal ethmoid air cells.  Minimal bilateral ethmoid sinus mucosal thickening.  Mastoid air cells are clear.    SKULL/SCALP: No acute calvarial abnormality.  Incidental persistent metopic suture.  Opacities in the external auditory canals, likely cerumen.    ORBITS: Bilateral lens replacements.  Impression: Nighthawk concordant.  No acute intracranial abnormality.    Electronically signed by: Mark Anthony Churchill MD  Date:    08/17/2024  Time:    08:36         Medication List        CONTINUE taking these medications      aspirin 81 MG Chew     clopidogreL 75 mg tablet  Commonly known as: PLAVIX     famotidine 40 MG tablet  Commonly known as: PEPCID     finasteride 5 mg tablet  Commonly known as: PROSCAR     FOLIC-K ORAL     ranolazine 1,000 mg Tb12  Commonly known as: RANEXA     rosuvastatin 40 MG Tab  Commonly known as: CRESTOR     tamsulosin 0.4 mg Cap  Commonly known as: FLOMAX            STOP taking these medications      furosemide 20 MG tablet  Commonly known as: LASIX     metoprolol tartrate 25 MG tablet  Commonly known as: LOPRESSOR               Explained in detail to the patient about the discharge plan, medications, and follow-up visits. Pt understands and agrees with the treatment plan  Discharge Disposition: Skilled Nursing Facility   Discharged Condition: stable  Diet-   Dietary Orders (From admission, onward)       Start     Ordered    08/15/24 1304  Dietary nutrition supplements BID; Boost Very High Calorie Nutritional Drink - Any flavor  Continuous        Question Answer Comment   Frequency: BID    Select PO Supplement: Boost Very High Calorie Nutritional Drink - Any flavor         08/15/24 1303    08/14/24 1542  Diet Pureed (IDDSI Level 4)  Diet effective now         08/14/24 1541                   Medications Per DC med rec  Activities as tolerated   Follow-up Information       Alexander Guallpa MD. Schedule an appointment as soon as possible for a visit in 1 week(s).    Specialty: Family Medicine  Contact information:  [address] [facility] [address] [phone]                           For further questions contact hospitalist office    Discharge time 33 minutes    For worsening symptoms, chest pain, shortness of breath, increased abdominal pain, high grade fever, stroke or stroke like symptoms, immediately go to the nearest Emergency Room or call 911 as soon as possible.    Mame You DO  Department of Hospital Medicine  [facility]  08/20/2024

## 2024-08-27 NOTE — PHYSICIAN QUERY
Please clarify the nutritional diagnosis associated with the clinical findings:  Severe Protein Calorie Malnutrition

## 2024-08-28 ENCOUNTER — HOSPITAL ENCOUNTER (INPATIENT)
Facility: HOSPITAL | Age: 89
LOS: 3 days | Discharge: HOSPICE | DRG: 640 | End: 2024-09-01
Attending: STUDENT IN AN ORGANIZED HEALTH CARE EDUCATION/TRAINING PROGRAM | Admitting: INTERNAL MEDICINE
Payer: MEDICARE

## 2024-08-28 DIAGNOSIS — E87.0 HYPERNATREMIA: Primary | ICD-10-CM

## 2024-08-28 DIAGNOSIS — R07.9 CHEST PAIN: ICD-10-CM

## 2024-08-28 DIAGNOSIS — N17.9 AKI (ACUTE KIDNEY INJURY): ICD-10-CM

## 2024-08-28 LAB
ALBUMIN SERPL-MCNC: 3 G/DL (ref 3.4–4.8)
ALBUMIN/GLOB SERPL: 1.3 RATIO (ref 1.1–2)
ALP SERPL-CCNC: 64 UNIT/L (ref 40–150)
ALT SERPL-CCNC: 25 UNIT/L (ref 0–55)
ANION GAP SERPL CALC-SCNC: 8 MEQ/L
AST SERPL-CCNC: 39 UNIT/L (ref 5–34)
BASOPHILS # BLD AUTO: 0.03 X10(3)/MCL
BASOPHILS NFR BLD AUTO: 0.4 %
BILIRUB SERPL-MCNC: 0.9 MG/DL
BUN SERPL-MCNC: 18.6 MG/DL (ref 8.4–25.7)
CALCIUM SERPL-MCNC: 9.2 MG/DL (ref 8.8–10)
CHLORIDE SERPL-SCNC: 122 MMOL/L (ref 98–107)
CO2 SERPL-SCNC: 23 MMOL/L (ref 23–31)
CREAT SERPL-MCNC: 1.34 MG/DL (ref 0.73–1.18)
CREAT/UREA NIT SERPL: 14
EOSINOPHIL # BLD AUTO: 0.28 X10(3)/MCL (ref 0–0.9)
EOSINOPHIL NFR BLD AUTO: 3.7 %
ERYTHROCYTE [DISTWIDTH] IN BLOOD BY AUTOMATED COUNT: 15.2 % (ref 11.5–17)
GFR SERPLBLD CREATININE-BSD FMLA CKD-EPI: 51 ML/MIN/1.73/M2
GLOBULIN SER-MCNC: 2.3 GM/DL (ref 2.4–3.5)
GLUCOSE SERPL-MCNC: 99 MG/DL (ref 82–115)
HCT VFR BLD AUTO: 35.8 % (ref 42–52)
HGB BLD-MCNC: 11.6 G/DL (ref 14–18)
IMM GRANULOCYTES # BLD AUTO: 0.08 X10(3)/MCL (ref 0–0.04)
IMM GRANULOCYTES NFR BLD AUTO: 1 %
LYMPHOCYTES # BLD AUTO: 1.87 X10(3)/MCL (ref 0.6–4.6)
LYMPHOCYTES NFR BLD AUTO: 24.5 %
MAGNESIUM SERPL-MCNC: 2.4 MG/DL (ref 1.6–2.6)
MCH RBC QN AUTO: 32.6 PG (ref 27–31)
MCHC RBC AUTO-ENTMCNC: 32.4 G/DL (ref 33–36)
MCV RBC AUTO: 100.6 FL (ref 80–94)
MONOCYTES # BLD AUTO: 0.65 X10(3)/MCL (ref 0.1–1.3)
MONOCYTES NFR BLD AUTO: 8.5 %
NEUTROPHILS # BLD AUTO: 4.71 X10(3)/MCL (ref 2.1–9.2)
NEUTROPHILS NFR BLD AUTO: 61.9 %
NRBC BLD AUTO-RTO: 0 %
PLATELET # BLD AUTO: 196 X10(3)/MCL (ref 130–400)
PMV BLD AUTO: 10.2 FL (ref 7.4–10.4)
POTASSIUM SERPL-SCNC: 3.5 MMOL/L (ref 3.5–5.1)
PROT SERPL-MCNC: 5.3 GM/DL (ref 5.8–7.6)
RBC # BLD AUTO: 3.56 X10(6)/MCL (ref 4.7–6.1)
SODIUM SERPL-SCNC: 153 MMOL/L (ref 136–145)
WBC # BLD AUTO: 7.62 X10(3)/MCL (ref 4.5–11.5)

## 2024-08-28 PROCEDURE — 85025 COMPLETE CBC W/AUTO DIFF WBC: CPT | Performed by: STUDENT IN AN ORGANIZED HEALTH CARE EDUCATION/TRAINING PROGRAM

## 2024-08-28 PROCEDURE — 83735 ASSAY OF MAGNESIUM: CPT | Performed by: STUDENT IN AN ORGANIZED HEALTH CARE EDUCATION/TRAINING PROGRAM

## 2024-08-28 PROCEDURE — 82550 ASSAY OF CK (CPK): CPT | Performed by: STUDENT IN AN ORGANIZED HEALTH CARE EDUCATION/TRAINING PROGRAM

## 2024-08-28 PROCEDURE — 99285 EMERGENCY DEPT VISIT HI MDM: CPT

## 2024-08-28 PROCEDURE — 80053 COMPREHEN METABOLIC PANEL: CPT | Performed by: STUDENT IN AN ORGANIZED HEALTH CARE EDUCATION/TRAINING PROGRAM

## 2024-08-29 LAB
ALBUMIN SERPL-MCNC: 2.9 G/DL (ref 3.4–4.8)
ALBUMIN/GLOB SERPL: 1.3 RATIO (ref 1.1–2)
ALP SERPL-CCNC: 64 UNIT/L (ref 40–150)
ALT SERPL-CCNC: 24 UNIT/L (ref 0–55)
ANION GAP SERPL CALC-SCNC: 10 MEQ/L
ANION GAP SERPL CALC-SCNC: 11 MEQ/L
ANION GAP SERPL CALC-SCNC: 9 MEQ/L
AST SERPL-CCNC: 38 UNIT/L (ref 5–34)
BACTERIA #/AREA URNS AUTO: ABNORMAL /HPF
BASOPHILS # BLD AUTO: 0.02 X10(3)/MCL
BASOPHILS NFR BLD AUTO: 0.2 %
BILIRUB SERPL-MCNC: 0.8 MG/DL
BILIRUB UR QL STRIP.AUTO: NEGATIVE
BUN SERPL-MCNC: 13.9 MG/DL (ref 8.4–25.7)
BUN SERPL-MCNC: 14.5 MG/DL (ref 8.4–25.7)
BUN SERPL-MCNC: 16.4 MG/DL (ref 8.4–25.7)
CALCIUM SERPL-MCNC: 8.9 MG/DL (ref 8.8–10)
CALCIUM SERPL-MCNC: 9.1 MG/DL (ref 8.8–10)
CALCIUM SERPL-MCNC: 9.3 MG/DL (ref 8.8–10)
CHLORIDE SERPL-SCNC: 119 MMOL/L (ref 98–111)
CHLORIDE SERPL-SCNC: 120 MMOL/L (ref 98–111)
CHLORIDE SERPL-SCNC: 120 MMOL/L (ref 98–111)
CK SERPL-CCNC: 434 U/L (ref 30–200)
CLARITY UR: CLEAR
CO2 SERPL-SCNC: 19 MMOL/L (ref 23–31)
CO2 SERPL-SCNC: 22 MMOL/L (ref 23–31)
CO2 SERPL-SCNC: 23 MMOL/L (ref 23–31)
COLOR UR AUTO: YELLOW
CREAT SERPL-MCNC: 0.97 MG/DL (ref 0.73–1.18)
CREAT SERPL-MCNC: 1.07 MG/DL (ref 0.73–1.18)
CREAT SERPL-MCNC: 1.14 MG/DL (ref 0.73–1.18)
CREAT/UREA NIT SERPL: 14
EOSINOPHIL # BLD AUTO: 0.3 X10(3)/MCL (ref 0–0.9)
EOSINOPHIL NFR BLD AUTO: 3.7 %
ERYTHROCYTE [DISTWIDTH] IN BLOOD BY AUTOMATED COUNT: 15.1 % (ref 11.5–17)
GFR SERPLBLD CREATININE-BSD FMLA CKD-EPI: >60 ML/MIN/1.73/M2
GLOBULIN SER-MCNC: 2.2 GM/DL (ref 2.4–3.5)
GLUCOSE SERPL-MCNC: 117 MG/DL (ref 75–121)
GLUCOSE SERPL-MCNC: 127 MG/DL (ref 75–121)
GLUCOSE SERPL-MCNC: 129 MG/DL (ref 75–121)
GLUCOSE UR QL STRIP: NORMAL
HCT VFR BLD AUTO: 35.6 % (ref 42–52)
HGB BLD-MCNC: 11.7 G/DL (ref 14–18)
HGB UR QL STRIP: NEGATIVE
IMM GRANULOCYTES # BLD AUTO: 0.05 X10(3)/MCL (ref 0–0.04)
IMM GRANULOCYTES NFR BLD AUTO: 0.6 %
KETONES UR QL STRIP: NEGATIVE
LEUKOCYTE ESTERASE UR QL STRIP: NEGATIVE
LYMPHOCYTES # BLD AUTO: 1.91 X10(3)/MCL (ref 0.6–4.6)
LYMPHOCYTES NFR BLD AUTO: 23.7 %
MAGNESIUM SERPL-MCNC: 2.2 MG/DL (ref 1.6–2.6)
MCH RBC QN AUTO: 32.8 PG (ref 27–31)
MCHC RBC AUTO-ENTMCNC: 32.9 G/DL (ref 33–36)
MCV RBC AUTO: 99.7 FL (ref 80–94)
MONOCYTES # BLD AUTO: 0.63 X10(3)/MCL (ref 0.1–1.3)
MONOCYTES NFR BLD AUTO: 7.8 %
NEUTROPHILS # BLD AUTO: 5.16 X10(3)/MCL (ref 2.1–9.2)
NEUTROPHILS NFR BLD AUTO: 64 %
NITRITE UR QL STRIP: NEGATIVE
NRBC BLD AUTO-RTO: 0 %
PH UR STRIP: 5.5 [PH]
PLATELET # BLD AUTO: 175 X10(3)/MCL (ref 130–400)
PMV BLD AUTO: 10.2 FL (ref 7.4–10.4)
POTASSIUM SERPL-SCNC: 3 MMOL/L (ref 3.5–5.1)
POTASSIUM SERPL-SCNC: 3.4 MMOL/L (ref 3.5–5.1)
POTASSIUM SERPL-SCNC: 3.9 MMOL/L (ref 3.5–5.1)
PROT SERPL-MCNC: 5.1 GM/DL (ref 5.8–7.6)
PROT UR QL STRIP: ABNORMAL
RBC # BLD AUTO: 3.57 X10(6)/MCL (ref 4.7–6.1)
RBC #/AREA URNS AUTO: ABNORMAL /HPF
SODIUM SERPL-SCNC: 149 MMOL/L (ref 136–145)
SODIUM SERPL-SCNC: 151 MMOL/L (ref 136–145)
SODIUM SERPL-SCNC: 153 MMOL/L (ref 136–145)
SP GR UR STRIP.AUTO: 1.02 (ref 1–1.03)
SQUAMOUS #/AREA URNS LPF: ABNORMAL /HPF
UROBILINOGEN UR STRIP-ACNC: 2
WBC # BLD AUTO: 8.07 X10(3)/MCL (ref 4.5–11.5)
WBC #/AREA URNS AUTO: ABNORMAL /HPF

## 2024-08-29 PROCEDURE — 25000003 PHARM REV CODE 250

## 2024-08-29 PROCEDURE — A4216 STERILE WATER/SALINE, 10 ML: HCPCS

## 2024-08-29 PROCEDURE — 11000001 HC ACUTE MED/SURG PRIVATE ROOM

## 2024-08-29 PROCEDURE — 80053 COMPREHEN METABOLIC PANEL: CPT | Performed by: NURSE PRACTITIONER

## 2024-08-29 PROCEDURE — 63600175 PHARM REV CODE 636 W HCPCS: Performed by: INTERNAL MEDICINE

## 2024-08-29 PROCEDURE — 63600175 PHARM REV CODE 636 W HCPCS: Performed by: STUDENT IN AN ORGANIZED HEALTH CARE EDUCATION/TRAINING PROGRAM

## 2024-08-29 PROCEDURE — 25000003 PHARM REV CODE 250: Performed by: NURSE PRACTITIONER

## 2024-08-29 PROCEDURE — 96360 HYDRATION IV INFUSION INIT: CPT

## 2024-08-29 PROCEDURE — 85025 COMPLETE CBC W/AUTO DIFF WBC: CPT | Performed by: NURSE PRACTITIONER

## 2024-08-29 PROCEDURE — 81001 URINALYSIS AUTO W/SCOPE: CPT | Performed by: STUDENT IN AN ORGANIZED HEALTH CARE EDUCATION/TRAINING PROGRAM

## 2024-08-29 PROCEDURE — 83735 ASSAY OF MAGNESIUM: CPT | Performed by: NURSE PRACTITIONER

## 2024-08-29 PROCEDURE — 63600175 PHARM REV CODE 636 W HCPCS: Performed by: PHYSICIAN ASSISTANT

## 2024-08-29 PROCEDURE — 96372 THER/PROPH/DIAG INJ SC/IM: CPT | Performed by: STUDENT IN AN ORGANIZED HEALTH CARE EDUCATION/TRAINING PROGRAM

## 2024-08-29 PROCEDURE — 63600175 PHARM REV CODE 636 W HCPCS: Performed by: NURSE PRACTITIONER

## 2024-08-29 RX ORDER — POTASSIUM CHLORIDE 14.9 MG/ML
20 INJECTION INTRAVENOUS
Status: COMPLETED | OUTPATIENT
Start: 2024-08-29 | End: 2024-08-29

## 2024-08-29 RX ORDER — ZIPRASIDONE MESYLATE 20 MG/ML
10 INJECTION, POWDER, LYOPHILIZED, FOR SOLUTION INTRAMUSCULAR ONCE
Status: COMPLETED | OUTPATIENT
Start: 2024-08-29 | End: 2024-08-29

## 2024-08-29 RX ORDER — POTASSIUM CHLORIDE 20 MEQ/1
60 TABLET, EXTENDED RELEASE ORAL ONCE
Status: DISCONTINUED | OUTPATIENT
Start: 2024-08-29 | End: 2024-08-29

## 2024-08-29 RX ORDER — POTASSIUM CHLORIDE 7.45 MG/ML
10 INJECTION INTRAVENOUS
Status: DISCONTINUED | OUTPATIENT
Start: 2024-08-29 | End: 2024-08-29

## 2024-08-29 RX ORDER — SIMETHICONE 80 MG
1 TABLET,CHEWABLE ORAL 4 TIMES DAILY PRN
Status: DISCONTINUED | OUTPATIENT
Start: 2024-08-29 | End: 2024-09-01 | Stop reason: HOSPADM

## 2024-08-29 RX ORDER — PROCHLORPERAZINE EDISYLATE 5 MG/ML
5 INJECTION INTRAMUSCULAR; INTRAVENOUS EVERY 6 HOURS PRN
Status: DISCONTINUED | OUTPATIENT
Start: 2024-08-29 | End: 2024-08-30

## 2024-08-29 RX ORDER — ALUMINUM HYDROXIDE, MAGNESIUM HYDROXIDE, AND SIMETHICONE 1200; 120; 1200 MG/30ML; MG/30ML; MG/30ML
30 SUSPENSION ORAL 4 TIMES DAILY PRN
Status: DISCONTINUED | OUTPATIENT
Start: 2024-08-29 | End: 2024-09-01 | Stop reason: HOSPADM

## 2024-08-29 RX ORDER — POTASSIUM CHLORIDE 7.45 MG/ML
10 INJECTION INTRAVENOUS
Status: COMPLETED | OUTPATIENT
Start: 2024-08-29 | End: 2024-08-29

## 2024-08-29 RX ORDER — ACETAMINOPHEN 325 MG/1
650 TABLET ORAL EVERY 6 HOURS PRN
Status: DISCONTINUED | OUTPATIENT
Start: 2024-08-29 | End: 2024-09-01 | Stop reason: HOSPADM

## 2024-08-29 RX ORDER — POLYETHYLENE GLYCOL 3350 17 G/17G
17 POWDER, FOR SOLUTION ORAL 2 TIMES DAILY PRN
Status: DISCONTINUED | OUTPATIENT
Start: 2024-08-29 | End: 2024-09-01 | Stop reason: HOSPADM

## 2024-08-29 RX ORDER — WATER FOR INJECTION,STERILE
VIAL (ML) INJECTION
Status: COMPLETED
Start: 2024-08-29 | End: 2024-08-29

## 2024-08-29 RX ORDER — ENOXAPARIN SODIUM 100 MG/ML
40 INJECTION SUBCUTANEOUS EVERY 24 HOURS
Status: DISCONTINUED | OUTPATIENT
Start: 2024-08-29 | End: 2024-09-01 | Stop reason: HOSPADM

## 2024-08-29 RX ORDER — ASCORBIC ACID 500 MG
500 TABLET ORAL 2 TIMES DAILY
Status: ON HOLD | COMMUNITY
End: 2024-08-30 | Stop reason: HOSPADM

## 2024-08-29 RX ORDER — TALC
6 POWDER (GRAM) TOPICAL NIGHTLY PRN
Status: DISCONTINUED | OUTPATIENT
Start: 2024-08-29 | End: 2024-09-01 | Stop reason: HOSPADM

## 2024-08-29 RX ORDER — DEXTROSE MONOHYDRATE 50 MG/ML
INJECTION, SOLUTION INTRAVENOUS CONTINUOUS
Status: ACTIVE | OUTPATIENT
Start: 2024-08-29 | End: 2024-08-31

## 2024-08-29 RX ORDER — ZIPRASIDONE MESYLATE 20 MG/ML
10 INJECTION, POWDER, LYOPHILIZED, FOR SOLUTION INTRAMUSCULAR EVERY 4 HOURS PRN
Status: DISCONTINUED | OUTPATIENT
Start: 2024-08-29 | End: 2024-08-30

## 2024-08-29 RX ORDER — ZIPRASIDONE MESYLATE 20 MG/ML
20 INJECTION, POWDER, LYOPHILIZED, FOR SOLUTION INTRAMUSCULAR
Status: COMPLETED | OUTPATIENT
Start: 2024-08-29 | End: 2024-08-29

## 2024-08-29 RX ORDER — NALOXONE HCL 0.4 MG/ML
0.02 VIAL (ML) INJECTION
Status: DISCONTINUED | OUTPATIENT
Start: 2024-08-29 | End: 2024-09-01 | Stop reason: HOSPADM

## 2024-08-29 RX ORDER — ONDANSETRON HYDROCHLORIDE 2 MG/ML
4 INJECTION, SOLUTION INTRAVENOUS EVERY 4 HOURS PRN
Status: DISCONTINUED | OUTPATIENT
Start: 2024-08-29 | End: 2024-09-01 | Stop reason: HOSPADM

## 2024-08-29 RX ADMIN — POTASSIUM CHLORIDE 20 MEQ: 14.9 INJECTION, SOLUTION INTRAVENOUS at 09:08

## 2024-08-29 RX ADMIN — DEXTROSE MONOHYDRATE: 50 INJECTION, SOLUTION INTRAVENOUS at 07:08

## 2024-08-29 RX ADMIN — ZIPRASIDONE MESYLATE 20 MG: 20 INJECTION, POWDER, LYOPHILIZED, FOR SOLUTION INTRAMUSCULAR at 04:08

## 2024-08-29 RX ADMIN — POTASSIUM CHLORIDE 10 MEQ: 7.46 INJECTION, SOLUTION INTRAVENOUS at 10:08

## 2024-08-29 RX ADMIN — POTASSIUM CHLORIDE 10 MEQ: 7.46 INJECTION, SOLUTION INTRAVENOUS at 09:08

## 2024-08-29 RX ADMIN — ZIPRASIDONE MESYLATE 10 MG: 20 INJECTION, POWDER, LYOPHILIZED, FOR SOLUTION INTRAMUSCULAR at 09:08

## 2024-08-29 RX ADMIN — POTASSIUM CHLORIDE 10 MEQ: 7.46 INJECTION, SOLUTION INTRAVENOUS at 12:08

## 2024-08-29 RX ADMIN — ZIPRASIDONE MESYLATE 10 MG: 20 INJECTION, POWDER, LYOPHILIZED, FOR SOLUTION INTRAMUSCULAR at 02:08

## 2024-08-29 RX ADMIN — SODIUM CHLORIDE, POTASSIUM CHLORIDE, SODIUM LACTATE AND CALCIUM CHLORIDE 1000 ML: 600; 310; 30; 20 INJECTION, SOLUTION INTRAVENOUS at 01:08

## 2024-08-29 RX ADMIN — ENOXAPARIN SODIUM 40 MG: 40 INJECTION SUBCUTANEOUS at 05:08

## 2024-08-29 RX ADMIN — POTASSIUM CHLORIDE 10 MEQ: 7.46 INJECTION, SOLUTION INTRAVENOUS at 02:08

## 2024-08-29 RX ADMIN — POTASSIUM CHLORIDE 10 MEQ: 7.46 INJECTION, SOLUTION INTRAVENOUS at 11:08

## 2024-08-29 RX ADMIN — POTASSIUM CHLORIDE 20 MEQ: 14.9 INJECTION, SOLUTION INTRAVENOUS at 11:08

## 2024-08-29 RX ADMIN — WATER 10 ML: 1 INJECTION INTRAMUSCULAR; INTRAVENOUS; SUBCUTANEOUS at 04:08

## 2024-08-29 NOTE — H&P
[facility] Hospital Medicine History & Physical Examination       Patient Name: Nirmal Colorado  MRN: [id number]  Patient Class: IP- Inpatient   Admission Date: 8/28/2024   Admitting Physician: Pao Chu MD   Length of Stay: 0  Attending Physician: Brian Lopez MD   Primary Care Provider: Alexander Guallpa MD  Face-to-Face encounter date: 08/29/2024  Code Status: DNR    Chief Complaint: Altered Mental Status (Pt from [facility] was fighting staff given 2mg of ativan IM. Pt is now lightly sedated but is altered at this time. Baseline GCS is 14. )        Patient information was obtained from patient, patient's family, past medical records and ER records.     HISTORY OF PRESENT ILLNESS:   Nirmal Colorado is a 89 y.o. White male with a past medical history of hypertension, hyperlipidemia, coronary artery disease, CKD, advanced dementia with baseline encephalopathy, BPH and resident of [facility].  Patient was recent admission to hospital medicine services from 08/14/2024 to 08/20/2024 for altered mental status secondary to dehydration with severe free water deficit resulting in hypernatremia and hyperchloremia.  He received D5 with resolution of hypernatremia.  During admission Palliative Care was consulted in he was made DNR. The patient presented to [facility] on 8/28/2024 with a primary complaint of combative behavior. Patient was unable to give history information. I spoke with ZAHEER Argueta at nursing home who states the night nurse reported patient was yelling and screaming grabbed her arm and twisted it, patient knocked nurse's belongings off of desk and stating he wanted to go home. At baseline patient was oriented to self and will answer yes and no.  Ellie states patient is on a purred diet per dietary at nursing home. Family members have been asking to give him a normal diet. Patient was given regular food and began choking. There is also mention that family would like patient  to go to an assisted living facility. Ellie also reported patient has been getting fluids at the nursing home.     Patient received Ativan with EMS.  Upon presentation to the ED, temperature 97.7° F, heart rate 92, blood pressure 117/73, respiratory rate 18 and SpO2 100% on room air.  Labs with H&H 11.6/35.8, .6, sodium 153, chloride 122, BUN/creatinine 18.6/1.34, .  UA negative for infection.  CT of the head with no acute intracranial findings.  In ED patient received free water injections, IV fluid hydration and Geodon.  Patient did pull out IV resulting in skin care to left hand.  Patient was admitted to hospital medicine services for further medical management.    PAST MEDICAL HISTORY:   Hypertension  Hyperlipidemia   Coronary artery disease  CKD   Advanced dementia   BPH    PAST SURGICAL HISTORY:   Unable to obtain due to medical condition    ALLERGIES:   Patient has no known allergies.    FAMILY HISTORY:   Unable to obtain due to medical condition    SOCIAL HISTORY:   Unable to obtain due to medical condition    Screening for Social Drivers for health:  Patient screened for food insecurity, housing instability, transportation needs, utility difficulties, and interpersonal safety (select all that apply as identified as concern)  []Housing or Food  []Transportation Needs  []Utility Difficulties  []Interpersonal safety  [x]None    HOME MEDICATIONS:     Prior to Admission medications    Medication Sig Start Date End Date Taking? Authorizing Provider   aspirin 81 MG Chew Take 81 mg by mouth once daily.    Provider, Historical   clopidogreL (PLAVIX) 75 mg tablet Take 75 mg by mouth once daily. 7/8/24   Provider, Historical   famotidine (PEPCID) 40 MG tablet Take 40 mg by mouth once daily.    Provider, Historical   finasteride (PROSCAR) 5 mg tablet Take 5 mg by mouth once daily.    Provider, Historical   ranolazine (RANEXA) 1,000 mg Tb12 Take 1,000 mg by mouth 2 (two) times daily.    Provider,  Historical   rosuvastatin (CRESTOR) 40 MG Tab Take 40 mg by mouth every evening.    Provider, Historical   tamsulosin (FLOMAX) 0.4 mg Cap Take 1 capsule by mouth once daily. 5/23/24   Provider, Historical   vit E/B1/B6/FA/B12/ALA/coQ10 (FOLIC-K ORAL) Take 1 capsule by mouth Daily.    Provider, Historical       REVIEW OF SYSTEMS:   Unable to obtain due to medical condition    PHYSICAL EXAM:     VITAL SIGNS: 24 HRS MIN & MAX LAST   Temp  Min: 97.7 °F (36.5 °C)  Max: 97.7 °F (36.5 °C) 97.7 °F (36.5 °C)   BP  Min: 100/66  Max: 121/65 118/64   Pulse  Min: 84  Max: 92  86   Resp  Min: 14  Max: 20 18   SpO2  Min: 96 %  Max: 100 % 99 %       General appearance: male in no apparent distress.  No family at bedside.  HEENT: Atraumatic head.  Severely dry mucous membranes of oral cavity.  Lungs: Clear to auscultation bilaterally anteriorly.   Heart: Regular rate and rhythm.  No edema bilateral lower extremities.  Abdomen: Soft, non-distended, non-tender. Bowel sounds are normal.   Extremities: No cyanosis, clubbing. No deformities.  Skin: No Rash. Warm and dry.  Neuro:  Lethargic.  Opens eyes to sternal rub. When asking patient to talk he speaks a few words. Does not follow commands.   Psych/mental status: Calm.    LABS AND IMAGING:     Recent Labs   Lab 08/28/24  2245 08/29/24  0753   WBC 7.62 8.07   RBC 3.56* 3.57*   HGB 11.6* 11.7*   HCT 35.8* 35.6*   .6* 99.7*   MCH 32.6* 32.8*   MCHC 32.4* 32.9*   RDW 15.2 15.1    175   MPV 10.2 10.2       Recent Labs   Lab 08/28/24  2245   *   K 3.5   *   CO2 23   BUN 18.6   CREATININE 1.34*   CALCIUM 9.2   MG 2.40   ALBUMIN 3.0*   ALKPHOS 64   ALT 25   AST 39*   BILITOT 0.9       Microbiology Results (last 7 days)       ** No results found for the last 168 hours. **             CT Head Without Contrast  Narrative: EXAMINATION:  CT HEAD WITHOUT CONTRAST    CLINICAL HISTORY:  Mental status change, unknown cause;AMS;    TECHNIQUE:  CT imaging of the head performed  from the skull base to the vertex without intravenous contrast.  DLP 1058 mGycm. Automatic exposure control, adjustment of mA/kV or iterative reconstruction technique was used to reduce radiation.    COMPARISON:  16 August 2024    FINDINGS:  There is no acute cortical infarct, hemorrhage or mass lesion.  No new parenchymal attenuation abnormality.  Ventricular size is stable.  There are vascular calcifications.    Visualized paranasal sinuses and mastoid air cells are clear.  Impression: No acute intracranial findings.    No significant discrepancy with the preliminary report.    Electronically signed by: Neno Dubois  Date:    08/29/2024  Time:    06:39        ASSESSMENT & PLAN:   Assessment:  Advanced dementia coming in with combative behavior  Hypernatremia/hyperchloremia  Acute kidney injury, resolved  Hypokalemia  Macrocytic anemia  Hypertension, hyperlipidemia, coronary artery disease, CKD, advanced dementia with baseline encephalopathy, BPH    Plan:  - D5 infusion started  - Repeat BMP every 6 hours to monitor sodium  - 60 mEq of IV potassium chloride. Continue to monitor potassium, replacing as needed  - Wound care consulted for skin tear recommendations  - Case management consulted for discharge planning  - NPO until more awake  - Patient will need speech therapy for swallow evaluation  - Resume appropriate home medications when deemed necessary   - Labs in AM      VTE Prophylaxis: will be placed on Lovenox for DVT prophylaxis and will be advised to be as mobile as possible and sit in a chair as tolerated      __________________________________________________________________________  INPATIENT LIST OF MEDICATIONS     Current Facility-Administered Medications:     acetaminophen tablet 650 mg, 650 mg, Oral, Q6H PRN, Destiny Recinos AGACNP-BC    aluminum-magnesium hydroxide-simethicone 200-200-20 mg/5 mL suspension 30 mL, 30 mL, Oral, QID PRN, Destiny Recinos AGACNP-BC    D5W infusion, , Intravenous,  Continuous, Jorje, Destiny NORMADEVAN-BC, Last Rate: 100 mL/hr at 08/29/24 0732, New Bag at 08/29/24 0732    enoxaparin injection 40 mg, 40 mg, Subcutaneous, Daily, Destiny Recinos AGACNP-BC    melatonin tablet 6 mg, 6 mg, Oral, Nightly PRN, Destiny Recinos AGACNP-BC    naloxone 0.4 mg/mL injection 0.02 mg, 0.02 mg, Intravenous, PRN, Destiny Recinos AGACNP-BC    ondansetron injection 4 mg, 4 mg, Intravenous, Q4H PRN, Destiny Recinos AGACNP-BC    polyethylene glycol packet 17 g, 17 g, Oral, BID PRN, Destiny Recinos AGACNP-BC    prochlorperazine injection Soln 5 mg, 5 mg, Intravenous, Q6H PRN, Destiny Recinos AGACNP-BC    simethicone chewable tablet 80 mg, 1 tablet, Oral, QID PRN, Destiny Recinos AGACNP-BC    Current Outpatient Medications:     aspirin 81 MG Chew, Take 81 mg by mouth once daily., Disp: , Rfl:     clopidogreL (PLAVIX) 75 mg tablet, Take 75 mg by mouth once daily., Disp: , Rfl:     famotidine (PEPCID) 40 MG tablet, Take 40 mg by mouth once daily., Disp: , Rfl:     finasteride (PROSCAR) 5 mg tablet, Take 5 mg by mouth once daily., Disp: , Rfl:     ranolazine (RANEXA) 1,000 mg Tb12, Take 1,000 mg by mouth 2 (two) times daily., Disp: , Rfl:     rosuvastatin (CRESTOR) 40 MG Tab, Take 40 mg by mouth every evening., Disp: , Rfl:     tamsulosin (FLOMAX) 0.4 mg Cap, Take 1 capsule by mouth once daily., Disp: , Rfl:     vit E/B1/B6/FA/B12/ALA/coQ10 (FOLIC-K ORAL), Take 1 capsule by mouth Daily., Disp: , Rfl:       Scheduled Meds:   enoxparin  40 mg Subcutaneous Daily     Continuous Infusions:   D5W   Intravenous Continuous 100 mL/hr at 08/29/24 0732 New Bag at 08/29/24 0732     PRN Meds:.  Current Facility-Administered Medications:     acetaminophen, 650 mg, Oral, Q6H PRN    aluminum-magnesium hydroxide-simethicone, 30 mL, Oral, QID PRN    melatonin, 6 mg, Oral, Nightly PRN    naloxone, 0.02 mg, Intravenous, PRN    ondansetron, 4 mg, Intravenous, Q4H PRN    polyethylene glycol, 17 g, Oral, BID PRN     prochlorperazine, 5 mg, Intravenous, Q6H PRN    simethicone, 1 tablet, Oral, QID PRN      Discharge Planning and Disposition: Anticipated discharge to be determined.    I, CAITLYN Casillas, have reviewed and discussed the case with Dr. Brian Lopez MD       Please see the following addendum for further assessment and plan from there attending MD.      Portion of this note is dictated using EMR integrated voice recognition software and may be subjected to voice recognition errors not corrected with proofreading. Please  for clarification if needed.       Conchita Mckeon PA-C  08/29/2024

## 2024-08-29 NOTE — PROGRESS NOTES
TARA consulted with Grecia Sweet([phone]) regarding pt. CM spoke with nurse Ellie who advised in her opinion, he does not fit criteria for an Assisted Living Home. Nurse stated that they have spoken to the daughter regarding possible Palliative/Hospice care but nothing has been done as of yet. Nurse stated that the plan was for pt to reside at the nursing home 50 days and then assisted living but this may not be feasible as pt needs a lot of assistance. PT's daughter is Alaina Gallegos and her contact is [phone] or [phone]. Nurse advised that Ms Gallegos is aware that client may not fit an Assisted Living Program.

## 2024-08-29 NOTE — ED NOTES
Psych orr technician heard pump alarming and upon entering pt room noted that pt had removed his peripheral IV from his L hand and notified RN. Upon assessing pt L hand noted that there was a large skin tear where pt had removed IV. TUSAHR Mckeon notified, picture placed in chart for reference, and wound care order placed.

## 2024-08-29 NOTE — NURSING
Nurses Note -- 4 Eyes      8/29/2024   6:41 PM      Skin assessed during: admit      [] No Altered Skin Integrity Present    []Prevention Measures Documented      [x] Yes- Altered Skin Integrity Present or Discovered   [x] LDA Added if Not in Epic (Describe Wound)   [] New Altered Skin Integrity was Present on Admit and Documented in LDA   [x] Wound Image Taken    Wound Care Consulted? Yes    Attending Nurse:  Morgan Suh RN/Staff Member:   Daphne

## 2024-08-29 NOTE — CONSULTS
"Inpatient consult to Palliative Care  Consult performed by: Tasneem Sinha NP  Consult ordered by: Conchita Mckeon PA-C      Patient Name: Nirmal [place]   MRN: [id number]   Admission Date: 8/28/2024   Hospital Length of Stay: 0   Attending Provider: Brian Lopez MD   Consulting Provider: SHAUNA Collins  Reason for Consult: Goals of Care  Primary Care Physician: Alexander Guallpa MD     Principal Problem: <principal problem not specified>     Patient information was obtained from relative(s) and ER records.      Final diagnoses:  [E87.0] Hypernatremia (Primary)  [N17.9] ALFONSO (acute kidney injury)     Assessment/Plan:     I reviewed the family's understanding of the seriousness of the illness and its expected prognosis. We discussed the patient's goals of care and treatment preferences. I clarified current code status, which is Do Not Resuscitate. I identified the surrogate decision maker or health care POA, to be Alaina Gallegos ([phone]; [phone]). I answered all questions and we formulated a plan including recommendations for symptom management and how to best achieve goals of care.     Patient is resting on stretcher after receiving Geodon d/t agitation. He is unable to answer questions at this time. Spoke with nurse at NH who reports the patient was admitted to SNF at their facility initially from the hospital and has been hospitalized twice since. She reports the patient is not able to have a full conversation, has difficulty feeding himself, and does not participate in therapy. She reports he appears to have difficulty following simple commands, such as "stand up." Asked her to fax over the patient's medical POA and [place].    Spoke with step-daughter, Alaina, via telephone. She is going to bring a copy of medical POA and living will tomorrow. She reports patient has been hospitalized multiple times with overall decline recently.  She states during last admission they discussed not returning to the " "hospital, but due to events last night he was sent back to ED. She states he has never had aggression or episodes like this.  Additionally she reports she has never encountered anything like this and is unsure what to do." Reviewed that there is no acute intracranial findings on CT and no evidence of UTI on UA. Additional labs reviewed. Therefore, there needs to be consideration that this is progression of dementia and possibly terminal agitation. Discussed that at end of life patients often do not want to eat and drink, contributing to this recurrent dehydration and hypernatremia. She is tearful, but verbalizes understanding. Discussed that they will continue to treat agitation with medications. She confirms Do Not Resuscitate status. Scheduled meeting for around 10:00 a.m. tomorrow morning for further discussions. Offered support. Encouraged to call with questions or concerns.  Palliative Medicine will continue to follow.    Advance Care Planning     Date: 08/29/2024    Code Status  In light of the patients advanced and life limiting illness,I engaged the the family in a voluntary conversation about the patient's preferences for care  at the very end of life. The patient wishes to have a natural, peaceful death.  Along those lines, the patient does not wish to have CPR or other invasive treatments performed when his heart and/or breathing stops. I communicated to the family that a DNR order would be placed in his medical record to reflect this preference.    [facility]  I engaged the family in a voluntary conversation about advance care planning and we specifically addressed what the goals of care would be moving forward, in light of the patient's change in clinical status, specifically current condition.  We did specifically address the patient's likely prognosis, which is fair .  We explored the patient's values and preferences for future care.  The family endorses that what is most important right now is to focus on " improvement in condition but with limits to invasive therapies    Accordingly, we have decided that the best plan to meet the patient's goals includes continuing with treatment       History of Present Illness:     89 year old male with PMH of hypertension, hyperlipidemia, CAD, CKD, advanced dementia with baseline encephalopathy, BPH.  He is a resident of [facility].  He had recent hospitalization 8/14 to 8/20 for altered mental status secondary to dehydration with severe free water deficit resulting in hyponatremia and hyperchloremia.  This was treated with D5 and improved prior to discharge.  He presented back to the ED on 08/28 with a primary complaint of combative behavior-- per nursing home report the patient was yelling, screaming, grabbed and twisted the nurses arm, knocked belongings off the desk and stated he wanted to go home.  Per note his baseline is orientation to self, and answers yes or no questions.  He has a history of dysphagia and is on pureed diet.  He has required IV fluids at the nursing home.  Family is hopeful that patient discharge to assisted living facility.  He received Ativan with EMS, then IV fluids and Geodon in the ER.  CT head negative for acute intracranial findings.  UA negative for infection.  He is well known to palliative Medicine Service from previous hospital admission.      Active Ambulatory Problems     Diagnosis Date Noted    Severe malnutrition 08/15/2024     Resolved Ambulatory Problems     Diagnosis Date Noted    No Resolved Ambulatory Problems     No Additional Past Medical History        No past surgical history on file.     Review of patient's allergies indicates:  No Known Allergies       Current Facility-Administered Medications:     acetaminophen tablet 650 mg, 650 mg, Oral, Q6H PRN, Destiny Recinos AGACNP-BC    aluminum-magnesium hydroxide-simethicone 200-200-20 mg/5 mL suspension 30 mL, 30 mL, Oral, QID PRN, Destiny Recinos AGACNP-BC    D5W infusion,  , Intravenous, Continuous, Aneesh Recinoslimyranda GREEN, AGACNP-BC, Last Rate: 100 mL/hr at 08/29/24 0732, New Bag at 08/29/24 0732    enoxaparin injection 40 mg, 40 mg, Subcutaneous, Daily, Jorje, Destiny GREEN, AGACNP-BC    melatonin tablet 6 mg, 6 mg, Oral, Nightly PRN, Destiny Recinos NORMA AGACNP-BC    naloxone 0.4 mg/mL injection 0.02 mg, 0.02 mg, Intravenous, PRN, Destiny Recinos, AGACNP-BC    ondansetron injection 4 mg, 4 mg, Intravenous, Q4H PRN, Destiny Recinos, AGACNP-BC    polyethylene glycol packet 17 g, 17 g, Oral, BID PRN, Destiny Recinos, AGACNP-BC    potassium chloride 10 mEq in 100 mL IVPB, 10 mEq, Intravenous, Q1H, Conchita Mckeon PA-C, Last Rate: 100 mL/hr at 08/29/24 1244, 10 mEq at 08/29/24 1244    prochlorperazine injection Soln 5 mg, 5 mg, Intravenous, Q6H PRN, Destiny Recinos, AGACNP-BC    simethicone chewable tablet 80 mg, 1 tablet, Oral, QID PRN, Jorje Destiny NORMA, AGACNP-BC    ziprasidone injection 10 mg, 10 mg, Intramuscular, Once, Conchita Mckeon PA-C    Current Outpatient Medications:     ascorbic acid, vitamin C, (VITAMIN C) 500 MG tablet, Take 500 mg by mouth 2 (two) times daily., Disp: , Rfl:     aspirin 81 MG Chew, Take 81 mg by mouth once daily., Disp: , Rfl:     clopidogreL (PLAVIX) 75 mg tablet, Take 75 mg by mouth once daily., Disp: , Rfl:     famotidine (PEPCID) 40 MG tablet, Take 40 mg by mouth once daily., Disp: , Rfl:     finasteride (PROSCAR) 5 mg tablet, Take 5 mg by mouth once daily., Disp: , Rfl:     MULTIVITAMIN ORAL, Take 1 tablet by mouth once daily., Disp: , Rfl:     ranolazine (RANEXA) 1,000 mg Tb12, Take 1,000 mg by mouth 2 (two) times daily., Disp: , Rfl:     rosuvastatin (CRESTOR) 40 MG Tab, Take 40 mg by mouth every evening., Disp: , Rfl:     tamsulosin (FLOMAX) 0.4 mg Cap, Take 1 capsule by mouth once daily., Disp: , Rfl:     vit E/B1/B6/FA/B12/ALA/coQ10 (FOLIC-K ORAL), Take 1 capsule by mouth Daily., Disp: , Rfl:        Current Facility-Administered Medications:      acetaminophen, 650 mg, Oral, Q6H PRN    aluminum-magnesium hydroxide-simethicone, 30 mL, Oral, QID PRN    melatonin, 6 mg, Oral, Nightly PRN    naloxone, 0.02 mg, Intravenous, PRN    ondansetron, 4 mg, Intravenous, Q4H PRN    polyethylene glycol, 17 g, Oral, BID PRN    prochlorperazine, 5 mg, Intravenous, Q6H PRN    simethicone, 1 tablet, Oral, QID PRN     No family history on file.     Review of Systems   Unable to perform ROS: Dementia            Objective:   BP (!) 147/85   Pulse 82   Temp 97.7 °F (36.5 °C) (Oral)   Resp (!) 21   Ht 6' (1.829 m)   Wt 73.3 kg (161 lb 9.6 oz)   SpO2 100%   BMI 21.92 kg/m²      Physical Exam  Constitutional:       General: He is not in acute distress.     Appearance: He is ill-appearing.   HENT:      Head: Normocephalic.      Mouth/Throat:      Mouth: Mucous membranes are dry.   Pulmonary:      Effort: Pulmonary effort is normal. No respiratory distress.   Abdominal:      General: There is no distension.   Musculoskeletal:      Right lower leg: No edema.      Left lower leg: No edema.   Skin:     General: Skin is warm and dry.   Neurological:      Comments: Unable to assess            Review of Symptoms  Review of Symptoms      Symptom Assessment (ESAS 0-10 Scale)  Pain:  0  Dyspnea:  0  Anxiety:  0  Nausea:  0  Depression:  0  Anorexia:  0  Fatigue:  0  Insomnia:  0  Restlessness:  0  Agitation:  0         Living Arrangements:  Lives in nursing home    Psychosocial/Cultural:   See Palliative Psychosocial Note: Yes  . Has 6 biological children that do not live locally and are not involved in his care, appears they are estranged. Alaina is his step-daughter who has medical POA. Currently in SNF at NH.  **Primary  to Follow**  Palliative Care  Consult: No      Advance Care Planning   Advance Directives:     Decision Making:  Family answered questions  Goals of Care: What is most important right now is to focus on quality of life, even if it means  sacrificing a little time, improvement in condition but with limits to invasive therapies. Accordingly, we have decided that the best plan to meet the patient's goals includes continuing with treatment.          PAINAD: NA    Caregiver burden formerly assessed: Yes    > 50% of 60 min of encounter was spent in chart review, face to face discussion of goals of care, symptom assessment, coordination of care and emotional support.    SHAUNA Collins-BC  Palliative Medicine  [facility]

## 2024-08-29 NOTE — NURSING
Admission documentation not completed by the admit nurse. Unable to assess d/t to pt's cognitive status. Home med rec not complete. Did not elect for meds to bed with [facility]. 4 Eyes and weight to be completed by the admitting floor nurse.

## 2024-08-29 NOTE — ED PROVIDER NOTES
Encounter Date: 8/28/2024    SCRIBE #1 NOTE: I, Constanza Mccoy, am scribing for, and in the presence of,  Gavin Dennison MD. I have scribed the following portions of the note - Other sections scribed: HPI, ROS, PE.       History     Chief Complaint   Patient presents with    Altered Mental Status     Pt from [facility] was fighting staff given 2mg of ativan IM. Pt is now lightly sedated but is altered at this time. Baseline GCS is 14.      89 year old male presents to the ED as a transfer from [facility] for altered mental status. Per NH, pt was combative with staff. He was given ativan en route. History from pt is not obtainable due to AMS.     The history is provided by the detention. The history is limited by the condition of the patient. No  was used.     Review of patient's allergies indicates:  No Known Allergies  No past medical history on file.  No past surgical history on file.  No family history on file.     Review of Systems   Unable to perform ROS: Mental status change       Physical Exam     Initial Vitals   BP Pulse Resp Temp SpO2   08/28/24 2206 08/28/24 2206 08/28/24 2206 08/28/24 2318 08/28/24 2206   117/73 92 18 97.7 °F (36.5 °C) 100 %      MAP       --                Physical Exam    Constitutional: He appears well-developed and well-nourished. He is not diaphoretic. No distress.   HENT:   Head: Normocephalic and atraumatic.   Right Ear: External ear normal.   Left Ear: External ear normal.   Nose: Nose normal.   Mouth/Throat: Mucous membranes are dry.   Eyes: EOM are normal. Pupils are equal, round, and reactive to light. Right eye exhibits no discharge. Left eye exhibits no discharge.   Cardiovascular:  Normal rate, regular rhythm and normal heart sounds.     Exam reveals no gallop and no friction rub.       No murmur heard.  Pulmonary/Chest: Effort normal and breath sounds normal. No respiratory distress. He has no wheezes. He has no rhonchi. He has  no rales. He exhibits no tenderness.   Abdominal: Abdomen is soft. Bowel sounds are normal. He exhibits no distension and no mass. There is no abdominal tenderness. There is no rebound and no guarding.   Musculoskeletal:         General: No edema. Normal range of motion.     Neurological:   Drowsy but arousable to verbal and tactile stimuli. Pt tries to speak and falls asleep quickly after arouses to stimuli.    Skin: Skin is warm and dry. Capillary refill takes less than 2 seconds.         ED Course   Procedures  Labs Reviewed   COMPREHENSIVE METABOLIC PANEL - Abnormal       Result Value    Sodium 153 (*)     Potassium 3.5      Chloride 122 (*)     CO2 23      Glucose 99      Blood Urea Nitrogen 18.6      Creatinine 1.34 (*)     Calcium 9.2      Protein Total 5.3 (*)     Albumin 3.0 (*)     Globulin 2.3 (*)     Albumin/Globulin Ratio 1.3      Bilirubin Total 0.9      ALP 64      ALT 25      AST 39 (*)     eGFR 51      Anion Gap 8.0      BUN/Creatinine Ratio 14     URINALYSIS, REFLEX TO URINE CULTURE - Abnormal    Color, UA Yellow      Appearance, UA Clear      Specific Gravity, UA 1.025      pH, UA 5.5      Protein, UA Trace (*)     Glucose, UA Normal      Ketones, UA Negative      Blood, UA Negative      Bilirubin, UA Negative      Urobilinogen, UA 2.0 (*)     Nitrites, UA Negative      Leukocyte Esterase, UA Negative      RBC, UA 0-5      WBC, UA 0-5      Bacteria, UA None Seen      Squamous Epithelial Cells, UA None Seen     CBC WITH DIFFERENTIAL - Abnormal    WBC 7.62      RBC 3.56 (*)     Hgb 11.6 (*)     Hct 35.8 (*)     .6 (*)     MCH 32.6 (*)     MCHC 32.4 (*)     RDW 15.2      Platelet 196      MPV 10.2      Neut % 61.9      Lymph % 24.5      Mono % 8.5      Eos % 3.7      Basophil % 0.4      Lymph # 1.87      Neut # 4.71      Mono # 0.65      Eos # 0.28      Baso # 0.03      IG# 0.08 (*)     IG% 1.0      NRBC% 0.0     CK - Abnormal    Creatine Kinase 434 (*)    MAGNESIUM - Normal    Magnesium Level  2.40     CBC W/ AUTO DIFFERENTIAL    Narrative:     The following orders were created for panel order CBC auto differential.  Procedure                               Abnormality         Status                     ---------                               -----------         ------                     CBC with Differential[[id number]]       Abnormal            Final result                 Please view results for these tests on the individual orders.   CBC W/ AUTO DIFFERENTIAL    Narrative:     The following orders were created for panel order CBC Auto Differential.  Procedure                               Abnormality         Status                     ---------                               -----------         ------                     CBC with Differential[[id number]]                                                        Please view results for these tests on the individual orders.   COMPREHENSIVE METABOLIC PANEL   MAGNESIUM   CBC WITH DIFFERENTIAL          Imaging Results              CT Head Without Contrast (Final result)  Result time 08/29/24 06:39:33      Final result by Neno Dubois MD (08/29/24 06:39:33)                   Impression:      No acute intracranial findings.    No significant discrepancy with the preliminary report.      Electronically signed by: Neno Dubois  Date:    08/29/2024  Time:    06:39               Narrative:    EXAMINATION:  CT HEAD WITHOUT CONTRAST    CLINICAL HISTORY:  Mental status change, unknown cause;AMS;    TECHNIQUE:  CT imaging of the head performed from the skull base to the vertex without intravenous contrast.  DLP 1058 mGycm. Automatic exposure control, adjustment of mA/kV or iterative reconstruction technique was used to reduce radiation.    COMPARISON:  16 August 2024    FINDINGS:  There is no acute cortical infarct, hemorrhage or mass lesion.  No new parenchymal attenuation abnormality.  Ventricular size is stable.  There are vascular calcifications.    Visualized  paranasal sinuses and mastoid air cells are clear.                        Preliminary result by Paul Obregon Jr., MD (08/29/24 01:22:36)                   Impression:    1. No acute intracranial process identified. Details and other findings as noted above.               Narrative:    START OF REPORT:  Technique: CT of the head was performed without intravenous contrast with axial as well as coronal and sagittal images.    Comparison: Comparison is with study dated 2024-08-16 23:09:10.    Dosage Information: Automated exposure control was utilized.    Clinical history: Ams, combative at nursing home.    Findings:  Hemorrhage: No acute intracranial hemorrhage is seen.  CSF spaces: The ventricles, sulci and basal cisterns all appear moderately prominent consistent with global cerebral atrophy. Additionally the ventricles appear dilated out of proportion to the sulci suggesting an element of concurrent non-obstructive hydrocephalus.  Brain parenchyma: There is preservation of the grey white junction throughout. No acute infarct is identified. Mild stable appearing scattered microvascular change is seen in portions of the periventricular and deep white matter tracts.  Cerebellum: Unremarkable.  Vascular: Mild atheromatous calcification of the intracranial arteries is seen.  Sella and skull base: The sella appears to be within normal limits for age.  Cerebellopontine angles: Within normal limits.  Herniation: None.  Intracranial calcifications: Incidental note is made of bilateral choroid plexus calcification. Incidental note is made of some pineal region calcification.  Calvarium: No acute linear or depressed skull fracture is seen.    Maxillofacial Structures:  Paranasal sinuses: The visualized paranasal sinuses appear clear with no significant mucoperiosteal thickening or air fluid levels identified.  Orbits: The orbits appear unremarkable.  Zygomatic arches: The zygomatic arches are intact and  unremarkable.  Temporal bones and mastoids: The temporal bones and mastoids appear unremarkable.  TMJ: The mandibular condyles appear normally placed with respect to the mandibular fossa.                                         Medications   melatonin tablet 6 mg (has no administration in time range)   ondansetron injection 4 mg (has no administration in time range)   prochlorperazine injection Soln 5 mg (has no administration in time range)   polyethylene glycol packet 17 g (has no administration in time range)   acetaminophen tablet 650 mg (has no administration in time range)   simethicone chewable tablet 80 mg (has no administration in time range)   aluminum-magnesium hydroxide-simethicone 200-200-20 mg/5 mL suspension 30 mL (has no administration in time range)   naloxone 0.4 mg/mL injection 0.02 mg (has no administration in time range)   D5W infusion (has no administration in time range)   enoxaparin injection 40 mg (has no administration in time range)   lactated ringers bolus 1,000 mL (0 mLs Intravenous Stopped 8/29/24 0246)   ziprasidone injection 20 mg (20 mg Intramuscular Given 8/29/24 0439)   sterile water for injection injection (10 mLs  Given 8/29/24 0439)     Medical Decision Making  Differential diagnosis includes but is not limited to Metabolic abnormality, intoxication, toxic ingestion, CVA, infection, structural (SAH, ICH, trauma, neoplastic), seizure/postictal, polypharmacy     Patient was arrives drowsy, arousable rapidly falls back asleep.  Evidently was combative with nursing home staff.  He was given Ativan prior to arrival.  Was seen recently for what seems to be similar.  Today he was once again hyponatremic with a acute kidney injury.  Will require admission further evaluation management.  Was not PEC'd because common cooperative here in the ED. believe that this time he was suitable for admission.  Discussed with Destiny, on-call for hospitalist.  Happy to admit.    Amount and/or  Complexity of Data Reviewed  External Data Reviewed: notes.     Details: See ED course  Labs: ordered. Decision-making details documented in ED Course.  Radiology: ordered and independent interpretation performed.     Details: CT head negative for acute.    Risk  OTC drugs.  Prescription drug management.  Decision regarding hospitalization.              Attending Attestation:           Physician Attestation for Scribe:  Physician Attestation Statement for Scribe #1: I, Gavin Dennison MD, reviewed documentation, as scribed by Constanza Mccoy in my presence, and it is both accurate and complete.             ED Course as of 08/29/24 0723   Thu Aug 29, 2024   0002 Comprehensive metabolic panel(!)  Hyponatremia and acute kidney injury new when compared to prior labs from 10 days ago.  Today creatinine 1.34.  Prior 0.99. [MM]   0003 CBC auto differential(!)  No leukocytosis.  Stable anemia. [MM]   0003 Magnesium : 2.40 [MM]   0007 That has a discharge summary from 08/20/2024 from this hospital.  Patient was admitted at that time for dehydration, hyponatremia, ALFONSO on chronic kidney disease stage 3. [MM]   0059 Patient was drowsy but arousable here in the emergency department.  Per nurse report he was given Ativan either by EMS or nursing home prior to arrival.  Arousable to verbal and tactile stimuli but rapidly falls back asleep.  Pupils equal round reactive.  Given age, uncertain history, we will obtain CT head.  Patient was altered mental status maybe due to the Ativan and/or to his hypernatremia and ALFONSO. [MM]   0130 CT Head Without Contrast  No head bleed or other acute process visualized [MM]   0130 Urinalysis, Reflex to Urine Culture(!)  Negative for UTI [MM]   0131 CPK(!): 434 [MM]      ED Course User Index  [MM] Gavin Dennison MD                           Clinical Impression:  Final diagnoses:  [E87.0] Hypernatremia (Primary)  [N17.9] ALFONSO (acute kidney injury)          ED Disposition Condition    Admit Stable                 Gavin Dennison MD  08/29/24 0723

## 2024-08-30 PROBLEM — Z51.5 COMFORT MEASURES ONLY STATUS: Status: ACTIVE | Noted: 2024-08-30

## 2024-08-30 LAB
ALBUMIN SERPL-MCNC: 2.5 G/DL (ref 3.4–4.8)
ALBUMIN/GLOB SERPL: 1 RATIO (ref 1.1–2)
ALP SERPL-CCNC: 63 UNIT/L (ref 40–150)
ALT SERPL-CCNC: 26 UNIT/L (ref 0–55)
ANION GAP SERPL CALC-SCNC: 10 MEQ/L
ANION GAP SERPL CALC-SCNC: 9 MEQ/L
AST SERPL-CCNC: 45 UNIT/L (ref 5–34)
BASOPHILS # BLD AUTO: 0.04 X10(3)/MCL
BASOPHILS NFR BLD AUTO: 0.5 %
BILIRUB SERPL-MCNC: 0.8 MG/DL
BUN SERPL-MCNC: 10.7 MG/DL (ref 8.4–25.7)
BUN SERPL-MCNC: 12.7 MG/DL (ref 8.4–25.7)
CALCIUM SERPL-MCNC: 9 MG/DL (ref 8.8–10)
CALCIUM SERPL-MCNC: 9 MG/DL (ref 8.8–10)
CHLORIDE SERPL-SCNC: 118 MMOL/L (ref 98–111)
CHLORIDE SERPL-SCNC: 119 MMOL/L (ref 98–111)
CO2 SERPL-SCNC: 18 MMOL/L (ref 23–31)
CO2 SERPL-SCNC: 18 MMOL/L (ref 23–31)
CREAT SERPL-MCNC: 0.83 MG/DL (ref 0.73–1.18)
CREAT SERPL-MCNC: 0.9 MG/DL (ref 0.73–1.18)
CREAT/UREA NIT SERPL: 13
CREAT/UREA NIT SERPL: 14
EOSINOPHIL # BLD AUTO: 0.32 X10(3)/MCL (ref 0–0.9)
EOSINOPHIL NFR BLD AUTO: 3.8 %
ERYTHROCYTE [DISTWIDTH] IN BLOOD BY AUTOMATED COUNT: 14.8 % (ref 11.5–17)
GFR SERPLBLD CREATININE-BSD FMLA CKD-EPI: >60 ML/MIN/1.73/M2
GFR SERPLBLD CREATININE-BSD FMLA CKD-EPI: >60 ML/MIN/1.73/M2
GLOBULIN SER-MCNC: 2.6 GM/DL (ref 2.4–3.5)
GLUCOSE SERPL-MCNC: 140 MG/DL (ref 75–121)
GLUCOSE SERPL-MCNC: 142 MG/DL (ref 75–121)
HCT VFR BLD AUTO: 36 % (ref 42–52)
HGB BLD-MCNC: 12.1 G/DL (ref 14–18)
IMM GRANULOCYTES # BLD AUTO: 0.08 X10(3)/MCL (ref 0–0.04)
IMM GRANULOCYTES NFR BLD AUTO: 0.9 %
LYMPHOCYTES # BLD AUTO: 1.53 X10(3)/MCL (ref 0.6–4.6)
LYMPHOCYTES NFR BLD AUTO: 18 %
MAGNESIUM SERPL-MCNC: 2.1 MG/DL (ref 1.6–2.6)
MCH RBC QN AUTO: 32.7 PG (ref 27–31)
MCHC RBC AUTO-ENTMCNC: 33.6 G/DL (ref 33–36)
MCV RBC AUTO: 97.3 FL (ref 80–94)
MONOCYTES # BLD AUTO: 0.62 X10(3)/MCL (ref 0.1–1.3)
MONOCYTES NFR BLD AUTO: 7.3 %
NEUTROPHILS # BLD AUTO: 5.89 X10(3)/MCL (ref 2.1–9.2)
NEUTROPHILS NFR BLD AUTO: 69.5 %
NRBC BLD AUTO-RTO: 0 %
PHOSPHATE SERPL-MCNC: 2 MG/DL (ref 2.3–4.7)
PLATELET # BLD AUTO: 168 X10(3)/MCL (ref 130–400)
PMV BLD AUTO: 9.9 FL (ref 7.4–10.4)
POTASSIUM SERPL-SCNC: 3.6 MMOL/L (ref 3.5–5.1)
POTASSIUM SERPL-SCNC: 3.9 MMOL/L (ref 3.5–5.1)
PROT SERPL-MCNC: 5.1 GM/DL (ref 5.8–7.6)
RBC # BLD AUTO: 3.7 X10(6)/MCL (ref 4.7–6.1)
SODIUM SERPL-SCNC: 145 MMOL/L (ref 136–145)
SODIUM SERPL-SCNC: 147 MMOL/L (ref 136–145)
WBC # BLD AUTO: 8.48 X10(3)/MCL (ref 4.5–11.5)

## 2024-08-30 PROCEDURE — 80048 BASIC METABOLIC PNL TOTAL CA: CPT | Performed by: PHYSICIAN ASSISTANT

## 2024-08-30 PROCEDURE — 25000003 PHARM REV CODE 250: Performed by: INTERNAL MEDICINE

## 2024-08-30 PROCEDURE — 11000001 HC ACUTE MED/SURG PRIVATE ROOM

## 2024-08-30 PROCEDURE — 36415 COLL VENOUS BLD VENIPUNCTURE: CPT | Performed by: PHYSICIAN ASSISTANT

## 2024-08-30 PROCEDURE — 80053 COMPREHEN METABOLIC PANEL: CPT | Performed by: NURSE PRACTITIONER

## 2024-08-30 PROCEDURE — 25000003 PHARM REV CODE 250: Performed by: NURSE PRACTITIONER

## 2024-08-30 PROCEDURE — 36415 COLL VENOUS BLD VENIPUNCTURE: CPT | Performed by: NURSE PRACTITIONER

## 2024-08-30 PROCEDURE — 25000003 PHARM REV CODE 250: Performed by: STUDENT IN AN ORGANIZED HEALTH CARE EDUCATION/TRAINING PROGRAM

## 2024-08-30 PROCEDURE — 85025 COMPLETE CBC W/AUTO DIFF WBC: CPT | Performed by: NURSE PRACTITIONER

## 2024-08-30 PROCEDURE — 63600175 PHARM REV CODE 636 W HCPCS

## 2024-08-30 PROCEDURE — 84100 ASSAY OF PHOSPHORUS: CPT | Performed by: NURSE PRACTITIONER

## 2024-08-30 PROCEDURE — 63600175 PHARM REV CODE 636 W HCPCS: Performed by: NURSE PRACTITIONER

## 2024-08-30 PROCEDURE — 83735 ASSAY OF MAGNESIUM: CPT | Performed by: NURSE PRACTITIONER

## 2024-08-30 RX ORDER — HALOPERIDOL 5 MG/ML
2 INJECTION INTRAMUSCULAR EVERY 4 HOURS PRN
Status: DISCONTINUED | OUTPATIENT
Start: 2024-08-30 | End: 2024-09-01 | Stop reason: HOSPADM

## 2024-08-30 RX ORDER — HALOPERIDOL 5 MG/ML
5 INJECTION INTRAMUSCULAR EVERY 6 HOURS PRN
Status: DISCONTINUED | OUTPATIENT
Start: 2024-08-30 | End: 2024-09-01 | Stop reason: HOSPADM

## 2024-08-30 RX ORDER — MORPHINE SULFATE 20 MG/ML
10 SOLUTION ORAL
Status: DISCONTINUED | OUTPATIENT
Start: 2024-08-30 | End: 2024-09-01 | Stop reason: HOSPADM

## 2024-08-30 RX ORDER — MORPHINE SULFATE 4 MG/ML
2 INJECTION, SOLUTION INTRAMUSCULAR; INTRAVENOUS
Status: DISCONTINUED | OUTPATIENT
Start: 2024-08-30 | End: 2024-09-01 | Stop reason: HOSPADM

## 2024-08-30 RX ORDER — GLYCOPYRROLATE 0.2 MG/ML
0.2 INJECTION INTRAMUSCULAR; INTRAVENOUS EVERY 6 HOURS PRN
Status: DISCONTINUED | OUTPATIENT
Start: 2024-08-30 | End: 2024-09-01 | Stop reason: HOSPADM

## 2024-08-30 RX ADMIN — DEXTROSE MONOHYDRATE: 50 INJECTION, SOLUTION INTRAVENOUS at 07:08

## 2024-08-30 RX ADMIN — MORPHINE SULFATE 2 MG: 4 INJECTION, SOLUTION INTRAMUSCULAR; INTRAVENOUS at 05:08

## 2024-08-30 RX ADMIN — HALOPERIDOL LACTATE 2 MG: 5 INJECTION, SOLUTION INTRAMUSCULAR at 06:08

## 2024-08-30 RX ADMIN — Medication: at 08:08

## 2024-08-30 RX ADMIN — DEXTROSE MONOHYDRATE: 50 INJECTION, SOLUTION INTRAVENOUS at 08:08

## 2024-08-30 RX ADMIN — ENOXAPARIN SODIUM 40 MG: 40 INJECTION SUBCUTANEOUS at 04:08

## 2024-08-30 RX ADMIN — HALOPERIDOL LACTATE 5 MG: 5 INJECTION, SOLUTION INTRAMUSCULAR at 11:08

## 2024-08-30 NOTE — PLAN OF CARE
Problem: Adult Inpatient Plan of Care  Goal: Plan of Care Review  Outcome: Progressing  Goal: Patient-Specific Goal (Individualized)  Outcome: Progressing  Goal: Absence of Hospital-Acquired Illness or Injury  Outcome: Progressing  Goal: Optimal Comfort and Wellbeing  Outcome: Progressing  Goal: Readiness for Transition of Care  Outcome: Progressing     Problem: Coping Ineffective  Goal: Effective Coping  Outcome: Progressing     Problem: Wound  Goal: Optimal Coping  Outcome: Progressing  Goal: Optimal Functional Ability  Outcome: Progressing  Goal: Absence of Infection Signs and Symptoms  Outcome: Progressing  Goal: Improved Oral Intake  Outcome: Progressing  Goal: Optimal Pain Control and Function  Outcome: Progressing  Goal: Skin Health and Integrity  Outcome: Progressing  Goal: Optimal Wound Healing  Outcome: Progressing     Problem: Skin Injury Risk Increased  Goal: Skin Health and Integrity  Outcome: Progressing     Problem: Fall Injury Risk  Goal: Absence of Fall and Fall-Related Injury  Outcome: Progressing     Problem: Restraint, Nonviolent  Goal: Absence of Harm or Injury  Outcome: Progressing     Problem: Palliative Care  Goal: Enhanced Quality of Life  Outcome: Progressing

## 2024-08-30 NOTE — PLAN OF CARE
08/30/24 1240   Discharge Assessment   Assessment Type Discharge Planning Assessment   Confirmed/corrected address, phone number and insurance Yes   Confirmed Demographics Correct on Facesheet   Source of Information family   Reason For Admission AMS   People in Home facility resident   Facility Arrived From: [facility]   Do you expect to return to your current living situation? No   Readmission within 30 days? Yes   Do you take prescription medications? Yes   Do you have prescription coverage? Yes   Coverage Medicare   Do you have any problems affording any of your prescribed medications? No   Is the patient taking medications as prescribed? yes   Who is going to help you get home at discharge? [facility]   Are you on dialysis? No   Do you take coumadin? No   Discharge Plan A Hospice/home   Discharge Plan B Hospice/home   DME Needed Upon Discharge  none   Discharge Plan discussed with: Adult children   Transition of Care Barriers None

## 2024-08-30 NOTE — PROGRESS NOTES
Patient Name: Nirmal Colorado   MRN: [id number]   Admission Date: 8/28/2024   Hospital Length of Stay: 1   Attending Provider: Kylah Salcedo DO   Consulting Provider: SHAUNA Collins  Reason for Consult: Goals of Care  Primary Care Physician:  Alexander Guallpa MD     Principal Problem: <principal problem not specified>     Patient information was obtained from patient, relative(s), and ER records.     Final diagnoses:  [E87.0] Hypernatremia (Primary)  [N17.9] ALFONSO (acute kidney injury)      Assessment/Plan:     I reviewed the family's understanding of the seriousness of the illness and its expected prognosis. We discussed the patient's goals of care and treatment preferences. I explained the differences between palliative and hospice care. I clarified current code status, which is Do Not Resucitate. I identified the surrogate decision maker or health care POA to be the step-daughter, Alaina. I answered all questions and we formulated a plan including recommendations for symptom management and how to best achieve goals of care.       Patient is resting in bed comfortably, sedated after medication d/t agitation and combative episodes overnight. Met with step-daughter/medical POA, Alaina, this morning and had extensive discussion. Reviewed the patient's overall course of decline over the last few months and recent hospital admissions.  She expresses concern and understanding of his overall decline with more frequent hospitalizations and no significant improvement. Reviewed that patient has poor PO intake resulting in multiple hospitalizations with hypernatremia and dehydration, along with declining functional and neurological status which is limiting his ability to participate in therapy services, along with recent onset of terminal agitation. Discussed that this is likely related to progression in Dementia and consideration needs to be made for quality of life versus burdens of hospitalizations/treatments. Alaina  states she does not believe the patient would want to continue with frequent hospitalizations and understands that he will likely continue to have recurrent hospitalizations d/t progression of disease and poor PO intake. Had extensive discussion regarding hospice options, in home, in nursing home, or inpatient. Discussed that with his agitation, he would need significant support at home and 24 hour care, which the family cannot provide. Reviewed nursing home and inpatient hospice options. Additionally we discussed transition to comfort measures and symptom management during the hospitalization. She elects to pursue comfort measures in the hospital with transition to hospice at inpatient facility. Discussed that we will consult case management for referral to hospice agency and initiate comfort measures. Discussed with case management, primary nurse, and attending physician. Offered support. Encouraged to call with questions or concerns. Palliative medicine will continue to follow.    Advance Care Planning     Date: 08/30/2024    [facility]  I engaged the healthcare power of   in a voluntary conversation about advance care planning and we specifically addressed what the goals of care would be moving forward, in light of the patient's change in clinical status, specifically current condition.  We did specifically address the patient's likely prognosis, which is poor.  We explored the patient's values and preferences for future care.  The healthcare power of   endorses that what is most important right now is to focus on symptom/pain control, quality of life, even if it means sacrificing a little time, and comfort and QOL     Accordingly, we have decided that the best plan to meet the patient's goals includes enrolling in hospice care and pivot to comfort-focused care    Hospice  I did explain the role for hospice care at this stage of the patient's illness, including its ability to help the patient live with  the best quality of life possible.  We willbe making a hospice referral.         Recommendations:     Consult Hospice of patient/family's choosing  Comfort measures initiated  Medications for symptom management ordered with alternate routes available given patient's frequent removal of IV access    Interval History:     Remained agitated and combative overnight. Now with mittens. Continues to require Geodon IM PRN. Na now normalized at 145.      Active Ambulatory Problems     Diagnosis Date Noted    Severe malnutrition 08/15/2024     Resolved Ambulatory Problems     Diagnosis Date Noted    No Resolved Ambulatory Problems     No Additional Past Medical History        No past surgical history on file.     Review of patient's allergies indicates:  No Known Allergies       Current Facility-Administered Medications:     acetaminophen tablet 650 mg, 650 mg, Oral, Q6H PRN, Destiny Recinos AGACNP-BC    aluminum-magnesium hydroxide-simethicone 200-200-20 mg/5 mL suspension 30 mL, 30 mL, Oral, QID PRN, Destiny Recinos AGACNP-BC    D5W infusion, , Intravenous, Continuous, Destiny Recinos AGACNP-BC, Last Rate: 100 mL/hr at 08/30/24 0729, New Bag at 08/30/24 0729    enoxaparin injection 40 mg, 40 mg, Subcutaneous, Daily, Destiny Recinos AGACNP-BC, 40 mg at 08/29/24 1751    melatonin tablet 6 mg, 6 mg, Oral, Nightly PRN, Destiny Recinos AGACNP-BC    naloxone 0.4 mg/mL injection 0.02 mg, 0.02 mg, Intravenous, PRN, Destiny Recinos AGACNP-BC    ondansetron injection 4 mg, 4 mg, Intravenous, Q4H PRN, Destiny Recinos AGACNP-BC    polyethylene glycol packet 17 g, 17 g, Oral, BID PRN, Destiny Recinos AGACNP-BC    prochlorperazine injection Soln 5 mg, 5 mg, Intravenous, Q6H PRN, Destiny Recinos AGACNP-BC    simethicone chewable tablet 80 mg, 1 tablet, Oral, QID PRN, Destiny Recinos AGACNP-BC    ziprasidone injection 10 mg, 10 mg, Intramuscular, Q4H PRN, Brian Lopez MD, 10 mg at 08/29/24 2100       Current  Facility-Administered Medications:     acetaminophen, 650 mg, Oral, Q6H PRN    aluminum-magnesium hydroxide-simethicone, 30 mL, Oral, QID PRN    melatonin, 6 mg, Oral, Nightly PRN    naloxone, 0.02 mg, Intravenous, PRN    ondansetron, 4 mg, Intravenous, Q4H PRN    polyethylene glycol, 17 g, Oral, BID PRN    prochlorperazine, 5 mg, Intravenous, Q6H PRN    simethicone, 1 tablet, Oral, QID PRN    ziprasidone, 10 mg, Intramuscular, Q4H PRN     No family history on file.       Review of Systems   Unable to perform ROS: Mental status change            Objective:   /74   Pulse 74   Temp 97.3 °F (36.3 °C) (Axillary)   Resp 18   Ht 6' (1.829 m)   Wt 73.3 kg (161 lb 9.6 oz)   SpO2 100%   BMI 21.92 kg/m²      Physical Exam   Constitutional: No distress. He appears ill.   HENT:   Head: Normocephalic.   Mouth/Throat: Mucous membranes are dry.   Cardiovascular: Normal rate and regular rhythm. Pulmonary:      Effort: Pulmonary effort is normal. No respiratory distress.     Abdominal: Soft. He exhibits no distension.   Musculoskeletal:      Right lower leg: No edema.      Left lower leg: No edema.   Neurological: He is disoriented.   Sedated   Skin: Skin is warm and dry.          Review of Symptoms  Review of Symptoms      Symptom Assessment (ESAS 0-10 Scale)  Pain:  0  Dyspnea:  0  Anxiety:  0  Nausea:  0  Depression:  0  Anorexia:  0  Fatigue:  0  Insomnia:  0  Restlessness:  0  Agitation:  0         Psychosocial/Cultural:   See Palliative Psychosocial Note: Yes  **Primary  to Follow**  Palliative Care  Consult: No      Advance Care Planning   Advance Directives:     Decision Making:  Family answered questions  Goals of Care: The healthcare power of   endorses that what is most important right now is to focus on symptom/pain control and comfort and QOL     Accordingly, we have decided that the best plan to meet the patient's goals includes enrolling in hospice care and pivot to  comfort-focused care            PAINAD: NA    Caregiver burden formerly assessed: Yes      > 50% of 35 min of encounter was spent in chart review, face to face discussion of goals of care, symptom assessment, coordination of care and emotional support.    30 minutes spent in ACP discussions.    SHAUNA Collins-BC  Palliative Medicine  [facility]

## 2024-08-30 NOTE — PLAN OF CARE
Spoke to [facility] about discharge plan family is not prepared to pay room and board at NH on hospice and GIP is not approved. States to cancel dc

## 2024-08-30 NOTE — NURSING
Patient starts to get agitated & combative around 8pm, tried to pull everything out. Geodon 10mg IM was given at 2100. Mittens was placed but still patient managed to remove it and tried to punch sitter. Kept yelling that he wants to get out. TUSHAR Dixon was informed and ordered soft restrains. Will continue to monitor.

## 2024-08-30 NOTE — NURSING
Nurses Note -- 4 Eyes      8/29/2024   7:47 PM      Skin assessed during: Q Shift Change      [] No Altered Skin Integrity Present    []Prevention Measures Documented      [x] Yes- Altered Skin Integrity Present or Discovered   [] LDA Added if Not in Epic (Describe Wound)   [] New Altered Skin Integrity was Present on Admit and Documented in LDA   [] Wound Image Taken    Wound Care Consulted? Yes    Attending Nurse:  Chastity Suh RN/Staff Member:   Morgan

## 2024-08-30 NOTE — PROGRESS NOTES
[facility] - 5th Floor Med Surg  Wound Care    Patient Name:  Nirmal Colorado   MRN:  [id number]  Date: 8/30/2024  Diagnosis: Severe malnutrition    History:     No past medical history on file.    Social History     Socioeconomic History    Marital status: Single       Precautions:     Allergies as of 08/28/2024    (No Known Allergies)       [facility] Assessment Details/Treatment        08/30/24 0930   Pressure Injury Prevention    Heel protection technique Heel boot   Heel preventative measures Apply        Wound 08/29/24 0846 Skin Tear Left posterior Hand   Date First Assessed/Time First Assessed: 08/29/24 0846   Present on Original Admission: No  Primary Wound Type: Skin Tear  Side: Left  Orientation: posterior  Location: Hand  Wound Approximate Age at First Assessment (Weeks): 0 weeks  Is this injury d...   Wound Image    Dressing Appearance Dried drainage   Drainage Amount Moderate   Drainage Characteristics/Odor Sanguineous   Appearance Purple;Red   Black (%), Wound Tissue Color 0 %   Red (%), Wound Tissue Color 100 %   Yellow (%), Wound Tissue Color 0 %   Periwound Area Ecchymotic   Wound Edges Jagged   Wound Length (cm) 2.5 cm   Wound Width (cm) 3 cm   Wound Depth (cm) 0.1 cm   Wound Volume (cm^3) 0.75 cm^3   Wound Surface Area (cm^2) 7.5 cm^2   Care Cleansed with:;Soap and water   Dressing Foam        Wound 08/29/24 2000 Skin Tear Left Arm   Date First Assessed/Time First Assessed: 08/29/24 2000   Primary Wound Type: Skin Tear  Side: Left  Location: Arm   Wound Image    Dressing Appearance Open to air   Drainage Amount None   Periwound Area Dry   Wound Edges Irregular   Wound Length (cm) 4 cm   Wound Width (cm) 1.5 cm   Wound Depth (cm) 0.1 cm   Wound Volume (cm^3) 0.6 cm^3   Wound Surface Area (cm^2) 6 cm^2   Care Cleansed with:;Soap and water   Safety Management   Safety Promotion/Fall Prevention assistive device/personal item within reach;Fall Risk signage in place;nonskid shoes/socks when out of  bed;/camera at bedside;side rails raised x 3   Patient Rounds bed in low position;bed wheels locked;call light in patient/parent reach;clutter free environment maintained;ID band on;placement of personal items at bedside;visualized patient   Daily Care   Activity Management Rolling - L1   Activity Assistance Provided assistance, 1 person   Elimination Assistance incontinence pad changed   Positioning   Body Position turned;right;position maintained   Head of Bed (HOB) Positioning HOB at 20-30 degrees   Positioning/Transfer Devices pillows;wedge   Hygiene Care   Hygiene Assistance per care provider x 1     Pt presented to the hospital for ALFONSO. Pt has multiple skin tears to BL upper extremities. Cleanse all skin tears with remedy skin cleanser and dry thoroughly, for wounds with open wound bed, cover with foam dressing every other day until healed. For dry skin tears, leave open to air. Family and sitter at bedside educated on wound care and preventative measures, verbalized understanding, all questions answered. Nurse to continue treatment recommendations, spoke with nurse Klein about plan of care. Continue to turn q2 hrs and offload heels.     Recommendations made to primary team. Orders placed.     08/30/2024

## 2024-08-30 NOTE — NURSING
Nurses Note -- 4 Eyes      8/30/2024   0700 AM      Skin assessed during: Q Shift Change      [] No Altered Skin Integrity Present    []Prevention Measures Documented      [x] Yes- Altered Skin Integrity Present or Discovered   [] LDA Added if Not in Epic (Describe Wound)   [] New Altered Skin Integrity was Present on Admit and Documented in LDA   [] Wound Image Taken    Wound Care Consulted? Yes    Attending Nurse:  ILIANA Goldberg    Second RN/Staff Member:   ILIANA Haywood

## 2024-08-30 NOTE — PLAN OF CARE
Problem: Adult Inpatient Plan of Care  Goal: Plan of Care Review  8/29/2024 1947 by Chastity Levi RN  Outcome: Progressing  8/29/2024 1947 by Chastity Levi RN  Outcome: Progressing  Goal: Patient-Specific Goal (Individualized)  8/29/2024 1947 by Chastity Levi RN  Outcome: Progressing  8/29/2024 1947 by Chastity Levi RN  Outcome: Progressing  Goal: Absence of Hospital-Acquired Illness or Injury  8/29/2024 1947 by Chastity Levi RN  Outcome: Progressing  8/29/2024 1947 by Chastity Levi RN  Outcome: Progressing  Goal: Optimal Comfort and Wellbeing  Outcome: Progressing  Goal: Readiness for Transition of Care  Outcome: Progressing     Problem: Coping Ineffective  Goal: Effective Coping  Outcome: Progressing     Problem: Wound  Goal: Optimal Coping  Outcome: Progressing  Goal: Optimal Functional Ability  Outcome: Progressing  Goal: Absence of Infection Signs and Symptoms  Outcome: Progressing  Goal: Improved Oral Intake  Outcome: Progressing  Goal: Optimal Pain Control and Function  Outcome: Progressing  Goal: Skin Health and Integrity  Outcome: Progressing  Goal: Optimal Wound Healing  Outcome: Progressing     Problem: Skin Injury Risk Increased  Goal: Skin Health and Integrity  Outcome: Progressing

## 2024-08-30 NOTE — PLAN OF CARE
FOC signed and placed on chart [facility] notified of referral they will be here between 1:1:30 family in room and is aware.

## 2024-08-31 LAB
ANION GAP SERPL CALC-SCNC: 10 MEQ/L
BUN SERPL-MCNC: 8.9 MG/DL (ref 8.4–25.7)
CALCIUM SERPL-MCNC: 9.1 MG/DL (ref 8.8–10)
CHLORIDE SERPL-SCNC: 116 MMOL/L (ref 98–111)
CO2 SERPL-SCNC: 14 MMOL/L (ref 23–31)
CREAT SERPL-MCNC: 0.91 MG/DL (ref 0.73–1.18)
CREAT/UREA NIT SERPL: 10
GFR SERPLBLD CREATININE-BSD FMLA CKD-EPI: >60 ML/MIN/1.73/M2
GLUCOSE SERPL-MCNC: 99 MG/DL (ref 75–121)
POTASSIUM SERPL-SCNC: 4.4 MMOL/L (ref 3.5–5.1)
SODIUM SERPL-SCNC: 140 MMOL/L (ref 136–145)

## 2024-08-31 PROCEDURE — 36415 COLL VENOUS BLD VENIPUNCTURE: CPT | Performed by: INTERNAL MEDICINE

## 2024-08-31 PROCEDURE — 63600175 PHARM REV CODE 636 W HCPCS: Performed by: NURSE PRACTITIONER

## 2024-08-31 PROCEDURE — 63600175 PHARM REV CODE 636 W HCPCS

## 2024-08-31 PROCEDURE — 11000001 HC ACUTE MED/SURG PRIVATE ROOM

## 2024-08-31 PROCEDURE — 80048 BASIC METABOLIC PNL TOTAL CA: CPT | Performed by: INTERNAL MEDICINE

## 2024-08-31 RX ADMIN — HALOPERIDOL LACTATE 5 MG: 5 INJECTION, SOLUTION INTRAMUSCULAR at 08:08

## 2024-08-31 RX ADMIN — MORPHINE SULFATE 2 MG: 4 INJECTION, SOLUTION INTRAMUSCULAR; INTRAVENOUS at 05:08

## 2024-08-31 RX ADMIN — ENOXAPARIN SODIUM 40 MG: 40 INJECTION SUBCUTANEOUS at 05:08

## 2024-08-31 RX ADMIN — Medication: at 09:08

## 2024-08-31 RX ADMIN — Medication: at 08:08

## 2024-08-31 RX ADMIN — MORPHINE SULFATE 2 MG: 4 INJECTION, SOLUTION INTRAMUSCULAR; INTRAVENOUS at 01:08

## 2024-08-31 NOTE — PLAN OF CARE
[facility] called [facility]  four time [phone]   The staff played phone tag transferring me then allowing me to hold for a unbearable amount of time     [facility] try to call [facility] several times to notify of pt discharge for today    Spoke to Jules- mamie supervisor at [facility]  who is able to accept the pt today    Pt room is #[id number]  Pt nurse is Bethanie  [phone] fax#  [phone] (phone)  CM  will follow up on Tuesday    Spoke to Alaina who wants to follow up with [facility] on Tuesday

## 2024-08-31 NOTE — NURSING
Nurses Note -- 4 Eyes      8/30/2024   10:10 PM      Skin assessed during: Q Shift Change      [] No Altered Skin Integrity Present    []Prevention Measures Documented      [x] Yes- Altered Skin Integrity Present or Discovered   [] LDA Added if Not in Epic (Describe Wound)   [] New Altered Skin Integrity was Present on Admit and Documented in LDA   [] Wound Image Taken    Wound Care Consulted? Yes    Attending Nurse:  Charis Suh RN/Staff Member:   Latoya

## 2024-08-31 NOTE — PLAN OF CARE
Problem: Adult Inpatient Plan of Care  Goal: Plan of Care Review  8/30/2024 2209 by Charis Gallego LPN  Outcome: Not Progressing     Problem: Adult Inpatient Plan of Care  Goal: Patient-Specific Goal (Individualized)  8/30/2024 2209 by Charis Gallego LPN  Outcome: Not Progressing     Problem: Adult Inpatient Plan of Care  Goal: Absence of Hospital-Acquired Illness or Injury  8/30/2024 2209 by Charis Gallego LPN  Outcome: Not Progressing     Problem: Adult Inpatient Plan of Care  Goal: Optimal Comfort and Wellbeing  8/30/2024 2209 by Charis Gallego LPN  Outcome: Not Progressing     Problem: Adult Inpatient Plan of Care  Goal: Readiness for Transition of Care  8/30/2024 2209 by Charis Gallego LPN  Outcome: Not Progressing     Problem: Coping Ineffective  Goal: Effective Coping  8/30/2024 2209 by Charis Gallego LPN  Outcome: Not Progressing     Problem: Wound  Goal: Optimal Coping  8/30/2024 2209 by Charis Gallego LPN  Outcome: Not Progressing     Problem: Wound  Goal: Optimal Functional Ability  8/30/2024 2209 by Charis Gallego LPN  Outcome: Not Progressing     Problem: Wound  Goal: Absence of Infection Signs and Symptoms  8/30/2024 2209 by Charis Gallego LPN  Outcome: Not Progressing     Problem: Wound  Goal: Improved Oral Intake  8/30/2024 2209 by Charis Gallego LPN  Outcome: Not Progressing     Problem: Wound  Goal: Optimal Pain Control and Function  8/30/2024 2209 by Charis Gallego LPN  Outcome: Not Progressing     Problem: Wound  Goal: Skin Health and Integrity  8/30/2024 2209 by Charis Gallego LPN  Outcome: Not Progressing     Problem: Wound  Goal: Optimal Wound Healing  8/30/2024 2209 by Charis Gallego LPN  Outcome: Not Progressing     Problem: Skin Injury Risk Increased  Goal: Skin Health and Integrity  8/30/2024 2209 by Charis Gallego LPN  Outcome: Not Progressing     Problem: Fall Injury Risk  Goal: Absence of Fall and Fall-Related  Injury  8/30/2024 2209 by Charis Gallego LPN  Outcome: Not Progressing     Problem: Restraint, Nonviolent  Goal: Absence of Harm or Injury  8/30/2024 2209 by Charis Gallego LPN  Outcome: Not Progressing     Problem: Palliative Care  Goal: Enhanced Quality of Life  8/30/2024 2209 by Charis Gallego LPN  Outcome: Not Progressing

## 2024-08-31 NOTE — DISCHARGE SUMMARY
[facility] Hospital Medicine Progress Note      Chief Complaint:  Combative        HPI: (personally reviewed by me and is documented from initial H&P)      89 y.o. White male with a past medical history of hypertension, hyperlipidemia, coronary artery disease, CKD, advanced dementia with baseline encephalopathy, BPH and resident of [facility].  Patient was recent admission to hospital medicine services from 08/14/2024 to 08/20/2024 for altered mental status secondary to dehydration with severe free water deficit resulting in hypernatremia and hyperchloremia.  He received D5 with resolution of hypernatremia.  During admission Palliative Care was consulted in he was made DNR. The patient presented to [facility] on 8/28/2024 with a primary complaint of combative behavior. Patient was unable to give history information. I spoke with ZAHEER Argueta at nursing home who states the night nurse reported patient was yelling and screaming grabbed her arm and twisted it, patient knocked nurse's belongings off of desk and stating he wanted to go home. At baseline patient was oriented to self and will answer yes and no.  Ellie states patient is on a purred diet per dietary at nursing home. Family members have been asking to give him a normal diet. Patient was given regular food and began choking. There is also mention that family would like patient to go to an assisted living facility. Ellie also reported patient has been getting fluids at the nursing home.      Patient received Ativan with EMS.  Upon presentation to the ED, temperature 97.7° F, heart rate 92, blood pressure 117/73, respiratory rate 18 and SpO2 100% on room air.  Labs with H&H 11.6/35.8, .6, sodium 153, chloride 122, BUN/creatinine 18.6/1.34, .  UA negative for infection.  CT of the head with no acute intracranial findings.  In ED patient received free water injections, IV fluid hydration and Geodon.  Patient did pull  out IV resulting in skin care to left hand.  Patient was admitted to hospital medicine services for further medical management.      Interval History:      No overnight events.  No new complaints.  Non combative.    Objective Assessment:  Physical Exam      Vital signs have been personally reviewed by me   General: Appears comfortable, no acute distress.  Resting comfortably  Arousable  Integumentary: Warm, dry, intact.  Musculoskeletal: Purposeful movement noted.     Respiratory: No accessory muscle use. Breath sounds are equal.  Cardiovascular: Regular rate.       VITAL SIGNS: 24 HRS MIN & MAX LAST   Temp  Min: 96.9 °F (36.1 °C)  Max: 98.2 °F (36.8 °C) 98.2 °F (36.8 °C)   BP  Min: 117/75  Max: 120/80 120/80   Pulse  Min: 94  Max: 107  107   Resp  Min: 18  Max: 19 19   SpO2  Min: 95 %  Max: 100 % 98 %     CT Head Without Contrast  Narrative: EXAMINATION:  CT HEAD WITHOUT CONTRAST    CLINICAL HISTORY:  Mental status change, unknown cause;AMS;    TECHNIQUE:  CT imaging of the head performed from the skull base to the vertex without intravenous contrast.  DLP 1058 mGycm. Automatic exposure control, adjustment of mA/kV or iterative reconstruction technique was used to reduce radiation.    COMPARISON:  16 August 2024    FINDINGS:  There is no acute cortical infarct, hemorrhage or mass lesion.  No new parenchymal attenuation abnormality.  Ventricular size is stable.  There are vascular calcifications.    Visualized paranasal sinuses and mastoid air cells are clear.  Impression: No acute intracranial findings.    No significant discrepancy with the preliminary report.    Electronically signed by: Neno Dubois  Date:    08/29/2024  Time:    06:39    Recent Labs   Lab 08/28/24  2245 08/29/24  0753 08/30/24  0414   WBC 7.62 8.07 8.48   RBC 3.56* 3.57* 3.70*   HGB 11.6* 11.7* 12.1*   HCT 35.8* 35.6* 36.0*   .6* 99.7* 97.3*   MCH 32.6* 32.8* 32.7*   MCHC 32.4* 32.9* 33.6   RDW 15.2 15.1 14.8    175 168   MPV 10.2  10.2 9.9       Recent Labs   Lab 08/28/24  2245 08/29/24  0753 08/29/24  1529 08/30/24  0016 08/30/24  0414 08/31/24  0303   * 153*   < > 147* 145 140   K 3.5 3.0*   < > 3.9 3.6 4.4   * 120*   < > 119* 118* 116*   CO2 23 22*   < > 18* 18* 14*   BUN 18.6 16.4   < > 12.7 10.7 8.9   CREATININE 1.34* 1.14   < > 0.90 0.83 0.91   CALCIUM 9.2 9.1   < > 9.0 9.0 9.1   MG 2.40 2.20  --   --  2.10  --    ALBUMIN 3.0* 2.9*  --   --  2.5*  --    ALKPHOS 64 64  --   --  63  --    ALT 25 24  --   --  26  --    AST 39* 38*  --   --  45*  --    BILITOT 0.9 0.8  --   --  0.8  --     < > = values in this interval not displayed.     Microbiology Results (last 7 days)       ** No results found for the last 168 hours. **               Medications for Hospital Course         Scheduled Med:   enoxparin  40 mg Subcutaneous Daily    zinc oxide-cod liver oil   Topical (Top) BID      Continuous Infusions:       PRN Meds:    Current Facility-Administered Medications:     acetaminophen, 650 mg, Oral, Q6H PRN    aluminum-magnesium hydroxide-simethicone, 30 mL, Oral, QID PRN    glycopyrrolate, 0.2 mg, Intravenous, Q6H PRN    haloperidol lactate, 2 mg, Intravenous, Q4H PRN    haloperidol lactate, 5 mg, Intramuscular, Q6H PRN    lorazepam, 1 mg, Intravenous, Q4H PRN    melatonin, 6 mg, Oral, Nightly PRN    morphine, 10 mg, Oral, Q2H PRN    morphine, 2 mg, Intravenous, Q2H PRN    naloxone, 0.02 mg, Intravenous, PRN    ondansetron, 4 mg, Intravenous, Q4H PRN    polyethylene glycol, 17 g, Oral, BID PRN    simethicone, 1 tablet, Oral, QID PRN     Assessment and Plan      Advanced dementia coming in with combative behavior  Hypernatremia/hyperchloremia, improving  IVVD - poor /no oral intake   Acute kidney injury, resolved  Hypokalemia, replete   Macrocytic anemia  Hypertension, hyperlipidemia, coronary artery disease, CKD, advanced dementia with baseline encephalopathy, BPH       Above present on admission         Anticipated discharge and  Disposition when medically stable:  Hospice    Therapy: PT/OT/Speech--not indicated  Nutrition:  As tolerated     ___________________________________________________________________________________________________________________________________  Plans for discharge to nursing home with hospice care, this has not been set up yet thus will continue inpatient comfort care measures.    Patient currently getting IV morphine, will follow up with  to ensure patient does not qualify for inpatient hospice care at [facility] facility.    Continue supportive care  Continue checking vital signs q4hrs.  Reviewed and restarted appropriate home medications.     DVT prophylaxis initiated   Nurse notified to page me if any changes occur     _______________________________________________________________________________________________________________________________    I have spent 30 minutes on the day of the visit; time spent includes face to face time and non-face to face time preparing to see the patient (eg, review of tests), independently reviewing and interpreting medical records, both past and current; documenting clinical information in the electronic or other health record, and communicating results to the patient/family/caregiver and care coordinator and nursing team.      All diagnosis and differential diagnosis have been reviewed,  interpreted and communicated appropriately to care team. assessment and plan has been documented; I have personally reviewed the labs and test results that are presently available and pertinent to this hospital course; I have reviewed medical records based upon their availability.    I will continue to monitor closely and make adjustments to medical management as needed.    Kyalh Salcedo, DO   08/31/2024     This note was created with the assistance of Dragon voice recognition software. There may be transcription errors as a result of using this technology however minimal.  Effort has been made to assure accuracy of transcription but any obvious errors or omissions should be clarified with the author of the document.

## 2024-08-31 NOTE — NURSING
Nurses Note -- 4 Eyes      8/31/2024 0830      Skin assessed during: Q Shift Change      [x] No Altered Skin Integrity Present    [x]Prevention Measures Documented      [] Yes- Altered Skin Integrity Present or Discovered   [] LDA Added if Not in Epic (Describe Wound)   [] New Altered Skin Integrity was Present on Admit and Documented in LDA   [] Wound Image Taken    Wound Care Consulted? No    Attending Nurse:  Jorge Suh RN/Staff Member:  [email or web address]

## 2024-08-31 NOTE — PROGRESS NOTES
[facility] Hospital Medicine Progress Note        Chief Complaint: Altered Mental Status (Pt from [facility] was fighting staff given 2mg of ativan IM. Pt is now lightly sedated but is altered at this time. Baseline GCS is 14. )         Patient information was obtained from patient, patient's family, past medical records and ER records.      HISTORY OF PRESENT ILLNESS:   Nirmal [place] is a 89 y.o. White male with a past medical history of hypertension, hyperlipidemia, coronary artery disease, CKD, advanced dementia with baseline encephalopathy, BPH and resident of [facility].  Patient was recent admission to hospital medicine services from 08/14/2024 to 08/20/2024 for altered mental status secondary to dehydration with severe free water deficit resulting in hypernatremia and hyperchloremia.  He received D5 with resolution of hypernatremia.  During admission Palliative Care was consulted in he was made DNR. The patient presented to [facility] on 8/28/2024 with a primary complaint of combative behavior. Patient was unable to give history information. I spoke with ZAHEER Argueta at nursing home who states the night nurse reported patient was yelling and screaming grabbed her arm and twisted it, patient knocked nurse's belongings off of desk and stating he wanted to go home. At baseline patient was oriented to self and will answer yes and no.  Ellie states patient is on a purred diet per dietary at nursing home. Family members have been asking to give him a normal diet. Patient was given regular food and began choking. There is also mention that family would like patient to go to an assisted living facility. Ellie also reported patient has been getting fluids at the nursing home.      Patient received Ativan with EMS.  Upon presentation to the ED, temperature 97.7° F, heart rate 92, blood pressure 117/73, respiratory rate 18 and SpO2 100% on room air.  Labs with H&H 11.6/35.8, MCV  100.6, sodium 153, chloride 122, BUN/creatinine 18.6/1.34, .  UA negative for infection.  CT of the head with no acute intracranial findings.  In ED patient received free water injections, IV fluid hydration and Geodon.  Patient did pull out IV resulting in skin care to left hand.  Patient was admitted to hospital medicine services for further medical management.    Todays information  Patient seen and examined at bedside, sitter at bedside   No family member at bedside   Vitals reviewed and stable   Labs significant with Na 145, improving , k 3.6, co2 18 , phos 2   UA negative       Exam  General appearance: male in no apparent distress.   HEENT: Atraumatic head.  Severely dry mucous membranes of oral cavity.  Lungs: Clear to auscultation bilaterally anteriorly.   Heart: Regular rate and rhythm.  No edema bilateral lower extremities.  Abdomen: Soft, non-distended, non-tender. Bowel sounds are normal.   Extremities: No cyanosis, clubbing. No deformities.  Skin: No Rash. Warm and dry.  Neuro:  Lethargic.  Opens eyes to sternal rub. When asking patient to talk he speaks a few words. Does not follow commands.   Psych/mental status: Calm.       ASSESSMENT & PLAN:   Assessment:  Advanced dementia coming in with combative behavior  Hypernatremia/hyperchloremia, improving  IVVD - poor /no oral intake   Acute kidney injury, resolved  Hypokalemia, replete   Macrocytic anemia  Hypertension, hyperlipidemia, coronary artery disease, CKD, advanced dementia with baseline encephalopathy, BPH     Plan:  Palliative care on board- transitioning to hospice/comfort measures only   continue D5 infusion started  Wound care consulted for skin tear recommendations  NPO until more awake  Patient will need speech therapy for swallow evaluation   Labs in AM        VTE Prophylaxis: will be placed on Lovenox for DVT prophylaxis and will be advised to be as mobile as possible and sit in a chair as tolerated     VITAL SIGNS: 24 HRS MIN &  MAX LAST   Temp  Min: 97.7 °F (36.5 °C)  Max: 98.4 °F (36.9 °C) 98.4 °F (36.9 °C)   BP  Min: 102/63  Max: 116/68 110/71   Pulse  Min: 56  Max: 69  69   Resp  Min: 18  Max: 18 18   SpO2  Min: 93 %  Max: 97 % (!) 93 %     I have reviewed the following labs:  Recent Labs   Lab 08/12/24 0441 08/13/24 0433 08/14/24  0500   WBC 7.19 5.66 5.53   RBC 4.95 4.46* 4.39*   HGB 14.8 13.6* 13.4*   HCT 43.5 39.0* 38.4*   MCV 87.9 87.4 87.5   MCH 29.9 30.5 30.5   MCHC 34.0 34.9 34.9   RDW 12.8 12.7 12.7    206 200   MPV 9.0 9.0 9.4     Recent Labs   Lab 08/12/24 0441 08/13/24 0433 08/14/24  0500 08/15/24  0508   * 133* 132* 135*   K 3.5 3.4* 3.3* 4.1   CL 97* 101 98 101   CO2 24 22* 23 22*   BUN 20.0 19.6 16.7 15.4   CREATININE 1.05 1.00 0.99 0.92   CALCIUM 9.3 9.0 8.9 9.2   MG 2.60 2.10 1.80 2.00   ALBUMIN 3.6 3.4 3.3*  --    ALKPHOS 112 103 97  --    ALT 22 19 20  --    AST 24 20 25  --    BILITOT 0.8 0.7 0.7  --      Microbiology Results (last 7 days)       ** No results found for the last 168 hours. **          _____________________________________________________________________    Malnutrition Status:    Scheduled Med:   apixaban  5 mg Oral BID    aspirin  81 mg Oral Daily    atorvastatin  40 mg Oral QHS    diltiaZEM  360 mg Oral Daily    docusate sodium  50 mg Oral BID    ergocalciferol  50,000 Units Oral Q7 Days    guaiFENesin  600 mg Oral BID    insulin glargine U-100  15 Units Subcutaneous QHS    QUEtiapine  150 mg Oral Nightly      Continuous Infusions:     PRN Meds:    Current Facility-Administered Medications:     acetaminophen, 1,000 mg, Oral, Q6H PRN    aluminum-magnesium hydroxide-simethicone, 30 mL, Oral, QID PRN    bisacodyL, 10 mg, Rectal, Daily PRN    dextrose 10%, 12.5 g, Intravenous, PRN    dextrose 10%, 25 g, Intravenous, PRN    glucagon (human recombinant), 1 mg, Intramuscular, PRN    glucose, 16 g, Oral, PRN    glucose, 24 g, Oral, PRN    guaiFENesin 100 mg/5 ml, 200 mg, Oral, Q4H PRN     HYDROcodone-acetaminophen, 1 tablet, Oral, Q4H PRN    insulin aspart U-100, 1-10 Units, Subcutaneous, QID (AC + HS) PRN    melatonin, 6 mg, Oral, Nightly PRN    naloxone, 0.02 mg, Intravenous, PRN    ondansetron, 4 mg, Intravenous, Q4H PRN    prochlorperazine, 5 mg, Intravenous, Q6H PRN    senna-docusate 8.6-50 mg, 1 tablet, Oral, BID PRN    sodium chloride 0.9%, 10 mL, Intravenous, PRN     Radiology:  I have personally reviewed the following imaging and agree with the radiologist.     Cardiac catheterization  Procedure performed in the Invasive Lab    - See Procedure Log link below for nursing documentation    - See OpNote on Surgeries Tab for physician findings    - See Imaging Tab for radiologist dictation      Myrna Weiner MD  Department of Hospital Medicine   [facility]   08/18/2024

## 2024-09-01 VITALS
RESPIRATION RATE: 18 BRPM | DIASTOLIC BLOOD PRESSURE: 69 MMHG | HEIGHT: 72 IN | WEIGHT: 161 LBS | OXYGEN SATURATION: 98 % | HEART RATE: 109 BPM | SYSTOLIC BLOOD PRESSURE: 120 MMHG | BODY MASS INDEX: 21.81 KG/M2 | TEMPERATURE: 100 F

## 2024-09-01 PROCEDURE — 63600175 PHARM REV CODE 636 W HCPCS

## 2024-09-01 RX ADMIN — MORPHINE SULFATE 2 MG: 4 INJECTION, SOLUTION INTRAMUSCULAR; INTRAVENOUS at 12:09

## 2024-09-01 RX ADMIN — HALOPERIDOL LACTATE 5 MG: 5 INJECTION, SOLUTION INTRAMUSCULAR at 04:09

## 2024-09-01 RX ADMIN — Medication: at 08:09

## 2024-09-01 RX ADMIN — MORPHINE SULFATE 2 MG: 4 INJECTION, SOLUTION INTRAMUSCULAR; INTRAVENOUS at 06:09

## 2024-09-01 NOTE — NURSING
Attempted to call report to Cris at [facility], but she doing consents with another family and will call back.

## 2024-09-01 NOTE — PLAN OF CARE
MD inquiring about inpatient hospice. Called patient's POAIsela. She states she spoke to Terrie with [facility] Friday and that she was going to check on whether patient qualifies. She did not hear back. Called [facility] and they do not see patient in the system. Spoke to olena, January Parada ([phone]) who is going to assess patient today. Gave her Isela's cell phone number, [phone]).

## 2024-09-01 NOTE — NURSING
Nurses Note -- 4 Eyes      9/1/2024   0830      Skin assessed during: Q Shift Change      [] No Altered Skin Integrity Present    []Prevention Measures Documented      [x] Yes- Altered Skin Integrity Present or Discovered   [x] LDA Added if Not in Epic (Describe Wound)   [x] New Altered Skin Integrity was Present on Admit and Documented in LDA   [x] Wound Image Taken    Wound Care Consulted? Yes    Attending Nurse:  Jorge Suh RN/Staff Member:  Diann

## 2024-09-01 NOTE — NURSING
Nurses Note -- 4 Eyes      8/31/2024   9:00 PM      Skin assessed during: Q Shift Change      [] No Altered Skin Integrity Present    []Prevention Measures Documented      [x] Yes- Altered Skin Integrity Present or Discovered   [] LDA Added if Not in Epic (Describe Wound)   [] New Altered Skin Integrity was Present on Admit and Documented in LDA   [] Wound Image Taken    Wound Care Consulted? Yes    Attending Nurse:  Charis Suh RN/Staff Member:   Jorge

## 2024-09-01 NOTE — PROGRESS NOTES
[facility] Hospital Medicine Progress Note      Chief Complaint:  Combative        HPI: (personally reviewed by me and is documented from initial H&P)      89 y.o. White male with a past medical history of hypertension, hyperlipidemia, coronary artery disease, CKD, advanced dementia with baseline encephalopathy, BPH and resident of [facility].  Patient was recent admission to hospital medicine services from 08/14/2024 to 08/20/2024 for altered mental status secondary to dehydration with severe free water deficit resulting in hypernatremia and hyperchloremia.  He received D5 with resolution of hypernatremia.  During admission Palliative Care was consulted in he was made DNR. The patient presented to [facility] on 8/28/2024 with a primary complaint of combative behavior. Patient was unable to give history information. I spoke with ZAHEER Argueta at nursing home who states the night nurse reported patient was yelling and screaming grabbed her arm and twisted it, patient knocked nurse's belongings off of desk and stating he wanted to go home. At baseline patient was oriented to self and will answer yes and no.  Ellie states patient is on a purred diet per dietary at nursing home. Family members have been asking to give him a normal diet. Patient was given regular food and began choking. There is also mention that family would like patient to go to an assisted living facility. Ellie also reported patient has been getting fluids at the nursing home.      Patient received Ativan with EMS.  Upon presentation to the ED, temperature 97.7° F, heart rate 92, blood pressure 117/73, respiratory rate 18 and SpO2 100% on room air.  Labs with H&H 11.6/35.8, .6, sodium 153, chloride 122, BUN/creatinine 18.6/1.34, .  UA negative for infection.  CT of the head with no acute intracranial findings.  In ED patient received free water injections, IV fluid hydration and Geodon.  Patient did pull  out IV resulting in skin care to left hand.  Patient was admitted to hospital medicine services for further medical management.      Interval History:      No overnight events.  No new complaints.  Non combative.    Objective Assessment:  Physical Exam      Vital signs have been personally reviewed by me   General: Appears comfortable, no acute distress.  Resting comfortably  Arousable  Integumentary: Warm, dry, intact.  Musculoskeletal: Purposeful movement noted.     Respiratory: No accessory muscle use. Breath sounds are equal.  Cardiovascular: Regular rate.       VITAL SIGNS: 24 HRS MIN & MAX LAST   Temp  Min: 96.9 °F (36.1 °C)  Max: 98.3 °F (36.8 °C) 98.3 °F (36.8 °C)   BP  Min: 117/81  Max: 120/80 117/81   Pulse  Min: 101  Max: 117  101   Resp  Min: 18  Max: 20 18   SpO2  Min: 96 %  Max: 98 % 96 %     CT Head Without Contrast  Narrative: EXAMINATION:  CT HEAD WITHOUT CONTRAST    CLINICAL HISTORY:  Mental status change, unknown cause;AMS;    TECHNIQUE:  CT imaging of the head performed from the skull base to the vertex without intravenous contrast.  DLP 1058 mGycm. Automatic exposure control, adjustment of mA/kV or iterative reconstruction technique was used to reduce radiation.    COMPARISON:  16 August 2024    FINDINGS:  There is no acute cortical infarct, hemorrhage or mass lesion.  No new parenchymal attenuation abnormality.  Ventricular size is stable.  There are vascular calcifications.    Visualized paranasal sinuses and mastoid air cells are clear.  Impression: No acute intracranial findings.    No significant discrepancy with the preliminary report.    Electronically signed by: Neno Dubois  Date:    08/29/2024  Time:    06:39    Recent Labs   Lab 08/28/24  2245 08/29/24  0753 08/30/24  0414   WBC 7.62 8.07 8.48   RBC 3.56* 3.57* 3.70*   HGB 11.6* 11.7* 12.1*   HCT 35.8* 35.6* 36.0*   .6* 99.7* 97.3*   MCH 32.6* 32.8* 32.7*   MCHC 32.4* 32.9* 33.6   RDW 15.2 15.1 14.8    175 168   MPV 10.2  10.2 9.9       Recent Labs   Lab 08/28/24  2245 08/29/24  0753 08/29/24  1529 08/30/24  0016 08/30/24  0414 08/31/24  0303   * 153*   < > 147* 145 140   K 3.5 3.0*   < > 3.9 3.6 4.4   * 120*   < > 119* 118* 116*   CO2 23 22*   < > 18* 18* 14*   BUN 18.6 16.4   < > 12.7 10.7 8.9   CREATININE 1.34* 1.14   < > 0.90 0.83 0.91   CALCIUM 9.2 9.1   < > 9.0 9.0 9.1   MG 2.40 2.20  --   --  2.10  --    ALBUMIN 3.0* 2.9*  --   --  2.5*  --    ALKPHOS 64 64  --   --  63  --    ALT 25 24  --   --  26  --    AST 39* 38*  --   --  45*  --    BILITOT 0.9 0.8  --   --  0.8  --     < > = values in this interval not displayed.     Microbiology Results (last 7 days)       ** No results found for the last 168 hours. **               Medications for Hospital Course         Scheduled Med:   enoxparin  40 mg Subcutaneous Daily    zinc oxide-cod liver oil   Topical (Top) BID      Continuous Infusions:       PRN Meds:    Current Facility-Administered Medications:     acetaminophen, 650 mg, Oral, Q6H PRN    aluminum-magnesium hydroxide-simethicone, 30 mL, Oral, QID PRN    glycopyrrolate, 0.2 mg, Intravenous, Q6H PRN    haloperidol lactate, 2 mg, Intravenous, Q4H PRN    haloperidol lactate, 5 mg, Intramuscular, Q6H PRN    lorazepam, 1 mg, Intravenous, Q4H PRN    melatonin, 6 mg, Oral, Nightly PRN    morphine, 10 mg, Oral, Q2H PRN    morphine, 2 mg, Intravenous, Q2H PRN    naloxone, 0.02 mg, Intravenous, PRN    ondansetron, 4 mg, Intravenous, Q4H PRN    polyethylene glycol, 17 g, Oral, BID PRN    simethicone, 1 tablet, Oral, QID PRN     Assessment and Plan      Advanced dementia coming in with combative behavior  Hypernatremia/hyperchloremia, improving  IVVD - poor /no oral intake   Acute kidney injury, resolved  Hypokalemia, replete   Macrocytic anemia  Hypertension, hyperlipidemia, coronary artery disease, CKD, advanced dementia with baseline encephalopathy, BPH       Above present on admission         Anticipated discharge and  Disposition when medically stable:  Hospice    Therapy: PT/OT/Speech--not indicated  Nutrition:  As tolerated     ___________________________________________________________________________________________________________________________________  Plans for discharge to nursing home with hospice care, this has not been set up yet thus will continue inpatient comfort care measures.    Patient currently getting IV morphine, will follow up with  to ensure patient does not qualify for inpatient hospice care at [facility] facility.    Continue supportive care  Continue checking vital signs q4hrs.  Reviewed and restarted appropriate home medications.     DVT prophylaxis initiated   Nurse notified to page me if any changes occur     _______________________________________________________________________________________________________________________________    I have spent 30 minutes on the day of the visit; time spent includes face to face time and non-face to face time preparing to see the patient (eg, review of tests), independently reviewing and interpreting medical records, both past and current; documenting clinical information in the electronic or other health record, and communicating results to the patient/family/caregiver and care coordinator and nursing team.      All diagnosis and differential diagnosis have been reviewed,  interpreted and communicated appropriately to care team. assessment and plan has been documented; I have personally reviewed the labs and test results that are presently available and pertinent to this hospital course; I have reviewed medical records based upon their availability.    I will continue to monitor closely and make adjustments to medical management as needed.    Kylah Salcedo, DO     Date of Service 8/31/2024    This note was created with the assistance of Dragon voice recognition software. There may be transcription errors as a result of using this technology  however minimal. Effort has been made to assure accuracy of transcription but any obvious errors or omissions should be clarified with the author of the document.

## 2024-09-01 NOTE — PLAN OF CARE
Problem: Adult Inpatient Plan of Care  Goal: Plan of Care Review  Outcome: Not Progressing  Goal: Patient-Specific Goal (Individualized)  Outcome: Not Progressing  Goal: Absence of Hospital-Acquired Illness or Injury  Outcome: Not Progressing  Goal: Optimal Comfort and Wellbeing  Outcome: Not Progressing  Goal: Readiness for Transition of Care  Outcome: Not Progressing     Problem: Wound  Goal: Optimal Coping  Outcome: Not Progressing  Goal: Optimal Functional Ability  Outcome: Not Progressing  Goal: Absence of Infection Signs and Symptoms  Outcome: Not Progressing  Goal: Improved Oral Intake  Outcome: Not Progressing  Goal: Optimal Pain Control and Function  Outcome: Not Progressing  Goal: Skin Health and Integrity  Outcome: Not Progressing  Goal: Optimal Wound Healing  Outcome: Not Progressing     Problem: Fall Injury Risk  Goal: Absence of Fall and Fall-Related Injury  Outcome: Not Progressing

## 2024-09-01 NOTE — NURSING
Report given to Cris at [facility]. Patient taken out of will call for [facility] ambulance to transport.

## 2024-09-01 NOTE — ASSESSMENT & PLAN NOTE
Patient meets ASPEN criteria for severe malnutrition of acute illness or injury per RD assessment as evidenced by:  Energy Intake (Malnutrition): less than or equal to 50% for greater than or equal to 5 days  Weight Loss (Malnutrition): greater than 5% in 1 month  Subcutaneous Fat (Malnutrition):  (unable to assess during rounds, not appropriate)  Muscle Mass (Malnutrition):  (unable to assess during rounds, not appropriate)           A minimum of two characteristics is recommended for diagnosis of either severe or non-severe malnutrition.

## 2024-09-01 NOTE — PROGRESS NOTES
Inpatient Nutrition Assessment    Admit Date: 8/28/2024   Total duration of encounter: 4 days   Patient Age: 89 y.o.    Nutrition Recommendation/Prescription     If comfort measures only, suggest diet when appropriate/needed for pleasure.  If aggressive interventions desired, could consider tube feeding (Isosource 1.5 Steve goal rate 80 ml/hr - 2400 kcal/108 g protein over 20 hours).    Communication of Recommendations: reviewed with nurse and reviewed with family    Nutrition Assessment     Malnutrition Assessment/Nutrition-Focused Physical Exam    Malnutrition Context: acute illness or injury (09/01/24 1238)  Malnutrition Level: severe (09/01/24 1238)  Energy Intake (Malnutrition): less than or equal to 50% for greater than or equal to 5 days (09/01/24 1238)  Weight Loss (Malnutrition): greater than 5% in 1 month (09/01/24 1238)  Subcutaneous Fat (Malnutrition):  (unable to assess during rounds, not appropriate) (09/01/24 1238)           Muscle Mass (Malnutrition):  (unable to assess during rounds, not appropriate) (09/01/24 1238)                                   A minimum of two characteristics is recommended for diagnosis of either severe or non-severe malnutrition.    Chart Review    Reason Seen: continuous nutrition monitoring and malnutrition screening tool (MST)    Malnutrition Screening Tool Results   Have you recently lost weight without trying?: Unsure  Have you been eating poorly because of a decreased appetite?: Yes   MST Score: 3   Diagnosis:  Advanced dementia coming in with combative behavior  Hypernatremia/hyperchloremia, improving  IVVD - poor/no oral intake   Acute kidney injury, resolved  Hypokalemia, replete   Macrocytic anemia  Hypertension, hyperlipidemia, coronary artery disease, CKD, advanced dementia with baseline encephalopathy, BPH    Scheduled Medications:  enoxparin, 40 mg, Daily  zinc oxide-cod liver oil, , BID    Continuous Infusions: none       PRN Medications:   acetaminophen, 650  mg, Q6H PRN  aluminum-magnesium hydroxide-simethicone, 30 mL, QID PRN  glycopyrrolate, 0.2 mg, Q6H PRN  haloperidol lactate, 2 mg, Q4H PRN  haloperidol lactate, 5 mg, Q6H PRN  lorazepam, 1 mg, Q4H PRN  melatonin, 6 mg, Nightly PRN  morphine, 10 mg, Q2H PRN  morphine, 2 mg, Q2H PRN  naloxone, 0.02 mg, PRN  ondansetron, 4 mg, Q4H PRN  polyethylene glycol, 17 g, BID PRN  simethicone, 1 tablet, QID PRN    Calorie Containing IV Medications: no significant kcals from medications at this time    Recent Labs   Lab 08/28/24  2245 08/29/24  0753 08/29/24  1529 08/29/24  1748 08/30/24  0016 08/30/24  0414 08/31/24  0303   * 153* 151* 149* 147* 145 140   K 3.5 3.0* 3.4* 3.9 3.9 3.6 4.4   CALCIUM 9.2 9.1 9.3 8.9 9.0 9.0 9.1   PHOS  --   --   --   --   --  2.0*  --    MG 2.40 2.20  --   --   --  2.10  --    * 120* 119* 120* 119* 118* 116*   CO2 23 22* 23 19* 18* 18* 14*   BUN 18.6 16.4 14.5 13.9 12.7 10.7 8.9   CREATININE 1.34* 1.14 1.07 0.97 0.90 0.83 0.91   EGFRNORACEVR 51 >60 >60 >60 >60 >60 >60   GLUCOSE 99 127* 117 129* 142* 140* 99   BILITOT 0.9 0.8  --   --   --  0.8  --    ALKPHOS 64 64  --   --   --  63  --    ALT 25 24  --   --   --  26  --    AST 39* 38*  --   --   --  45*  --    ALBUMIN 3.0* 2.9*  --   --   --  2.5*  --    WBC 7.62 8.07  --   --   --  8.48  --    HGB 11.6* 11.7*  --   --   --  12.1*  --    HCT 35.8* 35.6*  --   --   --  36.0*  --      Nutrition Orders:  Diet NPO    Appetite/Oral Intake: poor/0-25% of meals  Factors Affecting Nutritional Intake: impaired cognitive status/motor control, decreased appetite, and NPO  Social Needs Impacting Access to Food: none identified  Food/Confucianist/Cultural Preferences: none reported  Food Allergies: no known food allergies  Last Bowel Movement: 08/30/24  Wound(s):    Wound 08/16/24 Pressure Injury Left Gluteal cleft     Wound 08/29/24 0846 Skin Tear Left posterior Hand  Wound 08/29/24 2000 Skin Tear Left Arm2 days    Comments    9/1/24 Patient NPO, does  not appear alert enough for oral intake, multiple family members at bedside, reports minimal to no intake since Wednesday, poor appetite/intake prior to that, weight loss noted in chart weight history, family considering inpatient hospice at this point.    Anthropometrics    Height: 6' (182.9 cm), Height Method: Stated  Last Weight: 73 kg (161 lb) (240), Weight Method: Bed Scale  BMI (Calculated): 21.8  BMI Classification: underweight (BMI less than 22 if >65 years of age)        Ideal Body Weight (IBW), Male: 178 lb     % Ideal Body Weight, Male (lb): 90.45 %                 Usual Body Weight (UBW), k.6 kg (24)  % Usual Body Weight: 89.68     Usual Weight Provided By: EMR weight history    Wt Readings from Last 5 Encounters:   24 73 kg (161 lb)   08/15/24 73.3 kg (161 lb 9.6 oz)   24 81.6 kg (180 lb)     Weight Change(s) Since Admission: new admit  Wt Readings from Last 1 Encounters:   24 73 kg (161 lb)   24 73.3 kg (161 lb 9.6 oz)   Admit Weight: 73.3 kg (161 lb 9.6 oz) (24), Weight Method: Stated    Estimated Needs    Weight Used For Calorie Calculations: 73 kg (160 lb 15 oz)  Energy Calorie Requirements (kcal): 5761-8079, 30-35 kcal/kg     Weight Used For Protein Calculations: 73 kg (160 lb 15 oz)  Protein Requirements: 110 g, 1.5 g/kg  Fluid Requirements (mL): 6539-6018, 25-30 ml/kg      Enteral Nutrition Patient not receiving enteral nutrition at this time.    Parenteral Nutrition Patient not receiving parenteral nutrition support at this time.    Evaluation of Received Nutrient Intake    Calories: not meeting estimated needs  Protein: not meeting estimated needs    Patient Education Not applicable.    Nutrition Diagnosis     PES: Inadequate energy intake related to inability to consume sufficient nutrients as evidenced by less than 80% needs met. (new)  PES: Severe acute disease or injury related malnutrition related to suboptimal protein/energy  intake as evidenced by less than or equal to 50% needs met for greater than or equal to 5 days and greater than 5% weight loss in 1 month. (new)    Nutrition Interventions     Intervention(s): collaboration with other providers  Goal: Meet greater than 80% of nutritional needs by follow-up. - if aggressive interventions decided (new)     Nutrition Goals & Monitoring     Dietitian will monitor: food and beverage intake, energy intake, enteral nutrition intake, weight, weight change, electrolyte/renal panel, food/nutrition knowledge skill, and beliefs/attitudes  Discharge planning: too early to determine; pending clinical course  Nutrition Risk/Follow-Up: high (follow-up in 1-4 days)   Please consult if re-assessment needed sooner.

## 2024-09-02 NOTE — PLAN OF CARE
Patient discharged to [facility] . DC order/Summary/AVS sent to [facility] for [facility]  via care prt by TARA.

## 2024-09-02 NOTE — PLAN OF CARE
09/01/24 1910   Final Note   Assessment Type Final Discharge Note   Anticipated Discharge Disposition HospiceMedic   Post-Acute Status   Post-Acute Authorization Placement   Post-Acute Placement Status Set-up Complete/Auth obtained   Coverage [facility] with [facility]   Patient choice form signed by patient/caregiver List from CMS Compare   Discharge Delays None known at this time